# Patient Record
Sex: FEMALE | Race: WHITE | Employment: UNEMPLOYED | URBAN - METROPOLITAN AREA
[De-identification: names, ages, dates, MRNs, and addresses within clinical notes are randomized per-mention and may not be internally consistent; named-entity substitution may affect disease eponyms.]

---

## 2017-01-05 DIAGNOSIS — E78.5 HYPERLIPIDEMIA, UNSPECIFIED HYPERLIPIDEMIA TYPE: Primary | ICD-10-CM

## 2017-01-06 RX ORDER — ACETAMINOPHEN AND CODEINE PHOSPHATE 300; 30 MG/1; MG/1
TABLET ORAL
Qty: 30 TAB | Refills: 0 | OUTPATIENT
Start: 2017-01-06

## 2017-01-06 NOTE — TELEPHONE ENCOUNTER
I don't prescribe long term narcotics. Either get from one of her other doctors or see pain management.

## 2017-01-11 NOTE — TELEPHONE ENCOUNTER
Attempted to contact patient without success. Left voicemail message stating \"This message is for Fiordaliza Johnson,  This is Nay calling from Dr Ale Hernandez office to inform you that the medication requested from your pharmacy is not able to be filled at this time.  Please feel free to contact our office if you have any further questions \"

## 2017-01-12 ENCOUNTER — DOCUMENTATION ONLY (OUTPATIENT)
Dept: INTERNAL MEDICINE CLINIC | Age: 52
End: 2017-01-12

## 2017-01-13 LAB
EST. AVERAGE GLUCOSE BLD GHB EST-MCNC: 111 MG/DL
GLUCOSE SERPL-MCNC: 124 MG/DL (ref 65–99)
HBA1C MFR BLD: 5.5 % (ref 4.8–5.6)

## 2017-01-13 NOTE — PROGRESS NOTES
Note to patient:  Although your fasting blood sugar was elevated the day of testing your A1c shows that overall, your blood sugars are in the elaina range. Continue your dietary efforts.

## 2017-03-22 ENCOUNTER — TELEPHONE (OUTPATIENT)
Dept: INTERNAL MEDICINE CLINIC | Age: 52
End: 2017-03-22

## 2017-03-22 NOTE — TELEPHONE ENCOUNTER
----- Message from Shelly Sierra sent at 3/22/2017 12:24 PM EDT -----  Regarding: Dr. Keyon Bridges: [phone]  Patient called to advise that she is in the hospital at [facility] doctors, been in since last Friday, March 17,2017, Dr. Apolinar Ravi is the Heart doctor at [facility]. She is having heart electrical issues. Per Patient Dr. Apolinar Ravi sent for Dr. Jose Mccarthy at [facility]. Best contact number is [phone]. Patient would like to please have Dr. Marlys Gauthier to keep aware as to what is going on with her.       Thank you

## 2017-03-27 ENCOUNTER — OFFICE VISIT (OUTPATIENT)
Dept: INTERNAL MEDICINE CLINIC | Age: 52
End: 2017-03-27

## 2017-03-27 VITALS
HEART RATE: 60 BPM | OXYGEN SATURATION: 98 % | DIASTOLIC BLOOD PRESSURE: 60 MMHG | BODY MASS INDEX: 28.95 KG/M2 | WEIGHT: 163.4 LBS | HEIGHT: 63 IN | RESPIRATION RATE: 16 BRPM | TEMPERATURE: 98.4 F | SYSTOLIC BLOOD PRESSURE: 100 MMHG

## 2017-03-27 DIAGNOSIS — I47.20 VT (VENTRICULAR TACHYCARDIA): Primary | ICD-10-CM

## 2017-03-27 DIAGNOSIS — F41.1 GENERALIZED ANXIETY DISORDER: ICD-10-CM

## 2017-03-27 DIAGNOSIS — I25.10 CORONARY ARTERY DISEASE INVOLVING NATIVE CORONARY ARTERY OF NATIVE HEART WITHOUT ANGINA PECTORIS: ICD-10-CM

## 2017-03-27 RX ORDER — TEMAZEPAM 30 MG/1
30 CAPSULE ORAL
COMMUNITY

## 2017-03-27 RX ORDER — FAMOTIDINE 40 MG/1
40 TABLET, FILM COATED ORAL DAILY
COMMUNITY
End: 2018-12-27

## 2017-03-27 NOTE — PATIENT INSTRUCTIONS
Follow a Mediterranean style diet. Continue your current medications. Follow up with your heart specialists. Have all reports sent to our office.

## 2017-03-27 NOTE — PROGRESS NOTES
Chief Complaint   Patient presents with   [facility] Follow Up     [facility] 2017 -2017    Chest Pain (Angina)     Reviewed record in preparation for visit and have obtained necessary documentation. Identified pt with two pt identifiers(name and ). There are no preventive care reminders to display for this patient. Chief Complaint   Patient presents with   [facility] Follow Up     [facility] 2017 -2017    Chest Pain (Angina)        Wt Readings from Last 3 Encounters:   17 163 lb 6.4 oz (74.1 kg)   16 169 lb (76.7 kg)   16 170 lb 6.4 oz (77.3 kg)     Temp Readings from Last 3 Encounters:   17 98.4 °F (36.9 °C) (Oral)   16 97.7 °F (36.5 °C)   16 98 °F (36.7 °C) (Oral)     BP Readings from Last 3 Encounters:   17 100/60   16 123/67   16 110/70     Pulse Readings from Last 3 Encounters:   17 60   16 87   16 (!) 57           Learning Assessment:  :     Learning Assessment 2015   PRIMARY LEARNER Patient   HIGHEST LEVEL OF EDUCATION - PRIMARY LEARNER  SOME COLLEGE   BARRIERS PRIMARY LEARNER NONE   CO-LEARNER CAREGIVER No   PRIMARY LANGUAGE ENGLISH    NEED No   LEARNER PREFERENCE PRIMARY OTHER (COMMENT)   LEARNING SPECIAL TOPICS no   ANSWERED BY patient     self   RELATIONSHIP SELF   ASSESSMENT COMMENT none       Depression Screening:  :     PHQ 2 / 9, over the last two weeks 2015   Little interest or pleasure in doing things Nearly every day   Feeling down, depressed or hopeless Nearly every day   Total Score PHQ 2 6       Fall Risk Assessment:  :     No flowsheet data found. Abuse Screening:  :     Abuse Screening Questionnaire 2015   Do you ever feel afraid of your partner? N   Are you in a relationship with someone who physically or mentally threatens you? N   Is it safe for you to go home?  Y       Coordination of Care Questionnaire:  :     1) Have you been to an emergency room, urgent care clinic since your last visit? no   Hospitalized since your last visit? yes           2) Have you seen or consulted any other health care providers outside of [address] [place] [address] since your last visit? yes  (Include any pap smears or colon screenings in this section.)    3) Do you have an Advance Directive on file? no    4) Are you interested in receiving information on Advance Directives? NO      Patient is accompanied by self I have received verbal consent from David Michelle to discuss any/all medical information while they are present in the room. Reviewed record  In preparation for visit and have obtained necessary documentation.

## 2017-03-27 NOTE — PROGRESS NOTES
Subjective:     Chief Complaint   Patient presents with   [facility] Follow Up     [facility] 2017 -2017    Chest Pain (Angina)     She  is a 54y.o. year old female who presents for evaluation. Recently hospitalized at [facility] for chest pain. Developed chest pain about 10 years ago. Went to [facility] EM[facility]GENCY [facility]. Normal initial studies. Was in observation. Went into VT. Had a catheterization and no obstructive disease. Started on sotalol for VT. May be a candidate for pacemaker / AICD. Uncertainty is worrisome to her. Had a blood test that showed her blood was thick. Had a delayed reaction to catheterization. Wants disability considerations but told her that emphasis will be on abilities and not disabilities. Historical Data    Past Medical History:   Diagnosis Date    CAD (coronary artery disease)     Gastrointestinal disorder     hiatal hernia    Hypertension     Ill-defined condition     vertigo    MI (myocardial infarction) ([facility] [address]         Past Surgical History:   Procedure Laterality Date    CARDIAC SURG PROCEDURE UNLIST  12    stents x 3    CARDIAC SURG PROCEDURE UNLIST  10/2013    stents x 3    HX  SECTION      three    HX TUBAL LIGATION          Outpatient Encounter Prescriptions as of 3/27/2017   Medication Sig Dispense Refill    rosuvastatin (CRESTOR) 40 mg tablet Take 40 mg by mouth nightly.  aspirin delayed-release 81 mg tablet TAKE 2 TABS BY MOUTH DAILY. INDICATIONS: MYOCARDIAL INFARCTION PREVENTION 60 Tab 3    metoprolol succinate (TOPROL XL) 25 mg XL tablet Take 1.5 Tabs by mouth nightly. 135 Tab 2    LORazepam (ATIVAN) 0.5 mg tablet Take 1 Tab by mouth every eight (8) hours as needed for Anxiety. Max Daily Amount: 1.5 mg. Indications: ANXIETY 100 Tab 0    acetaminophen-codeine (TYLENOL #3) 300-30 mg per tablet Take 1 Tab by mouth every six (6) hours as needed for Pain. Max Daily Amount: 4 Tabs.  20 Tab 1    fenofibrate (LOFIBRA) 160 mg tablet Take 1 Tab by mouth daily. 90 Tab 2    atorvastatin (LIPITOR) 40 mg tablet Take 1 1/2 tablet once a day. 45 Tab 5    nitroglycerin (NITROSTAT) 0.4 mg SL tablet 1 Tab by SubLINGual route every five (5) minutes as needed. 1 Bottle 3    lisinopril (PRINIVIL, ZESTRIL) 5 mg tablet Take 1 Tab by mouth daily. 90 Tab 3    clopidogrel (PLAVIX) 75 mg tablet Take 1 Tab by mouth daily. 90 Tab 3    meclizine (ANTIVERT) 25 mg tablet Take  by mouth three (3) times daily as needed.  cyclobenzaprine (FLEXERIL) 10 mg tablet Take  by mouth three (3) times daily as needed for Muscle Spasm(s).  citalopram (CELEXA) 20 mg tablet Take 40 mg by mouth daily.  promethazine (PHENERGAN) 25 mg tablet Take 1 Tab by mouth every six (6) hours as needed for Nausea. 12 Tab 0    DICYCLOMINE HCL (BENTYL PO) Take  by mouth daily as needed.  OTHER,NON-FORMULARY, Pt takes a \"GI Cocktail\" formulated by Dr. Kaela Tejeda       No facility-administered encounter medications on file as of 3/27/2017. Allergies   Allergen Reactions    Erythromycin Anaphylaxis    Demerol [Meperidine] Other (comments)     hallucination        Social History     Social History    Marital status: LEGALLY      Spouse name: N/A    Number of children: N/A    Years of education: N/A     Occupational History    Not on file. Social History Main Topics    Smoking status: Former Smoker     Packs/day: 2.00     Years: 30.00     Types: Cigarettes     Quit date: 6/1/2012    Smokeless tobacco: Never Used    Alcohol use No    Drug use: No    Sexual activity: Yes     Partners: Male     Other Topics Concern    Not on file     Social History Narrative        Review of Systems  Pertinent items are noted in HPI. Objective:     Vitals:    03/27/17 1039   BP: 100/60   Pulse: 60   Resp: 16   Temp: 98.4 °F (36.9 °C)   TempSrc: Oral   SpO2: 98%   Weight: 163 lb 6.4 oz (74.1 kg)   Height: 5' 3\" (1.6 m)     Pleasant WF in no acute distress. Anxious.   Cardiac: RRR without murmurs, gallops or rubs. Lungs: Clear to auscultation. Neuro:  No focal motor deficits. ASSESSMENT / PLAN:   1. VT (ventricular tachycardia) ([address]  · Being treated medically for now. · Follow up with Cardiology. 2. Coronary artery disease involving native coronary artery of native heart without angina pectoris  · Continue secondary risk factor reduction. · Last catheterization shows no obstructive disease and a normal EF. 3. Generalized anxiety disorder  · Patient uses word \"cardiac cripple\" but no supportive evidence of that. · Encouraged her to think in terms of her abilities and not her disabilities. Patient Instructions   Follow a Mediterranean style diet. Continue your current medications. Follow up with your heart specialists. Have all reports sent to our office. Follow-up Disposition:  Return in about 6 weeks (around 5/8/2017) for F/U VT. Advised her to call back or return to office if symptoms worsen/change/persist.  Discussed expected course/resolution/complications of diagnosis in detail with patient. Medication risks/benefits/costs/interactions/alternatives discussed with patient. She was given an after visit summary which includes diagnoses, current medications, & vitals. She expressed understanding with the diagnosis and plan.

## 2017-03-27 NOTE — MR AVS SNAPSHOT
Visit Information Date & Time Provider Department Dept. Phone Encounter #  
 3/27/2017 10:15 AM Lindsey Ramirez MD [place] [id number] [facility] [phone] [id number] Follow-up Instructions Return in about 6 weeks (around 5/8/2017) for F/U VT. Upcoming Health Maintenance Date Due  
 PAP AKA CERVICAL CYTOLOGY 6/15/2019 DTaP/Tdap/Td series (2 - Td) 9/1/2022 Allergies as of 3/27/2017  Review Complete On: 3/27/2017 By: Lindsey Ramirez MD  
  
 Severity Noted Reaction Type Reactions Erythromycin High 11/11/2010    Anaphylaxis Demerol [Meperidine]  11/11/2010    Other (comments)  
 hallucination Sulfa (Sulfonamide Antibiotics)  03/27/2017    Other (comments) Current Immunizations  Reviewed on 1/29/2013 Name Date DTaP 9/1/2012 Influenza Vaccine 10/31/2015, 10/1/2012 Pneumococcal Vaccine (Unspecified Type) 10/1/2005 Not reviewed this visit You Were Diagnosed With   
  
 Codes Comments [facility]    -  Primary ICD-10-CM: Y12.4 ICD-9-CM: 427.1 Coronary artery disease involving native coronary artery of native heart without angina pectoris     ICD-10-CM: I25.10 ICD-9-CM: 414.01 Vitals BP Pulse Temp Resp Height(growth percentile) Weight(growth percentile) 100/60 (BP 1 Location: Right arm, BP Patient Position: Sitting) 60 98.4 °F (36.9 °C) (Oral) 16 5' 3\" (1.6 m) 163 lb 6.4 oz (74.1 kg) SpO2 BMI OB Status Smoking Status 98% 28.95 kg/m2 Having regular periods Former Smoker BMI and BSA Data Body Mass Index Body Surface Area  
 28.95 kg/m 2 1.81 m 2 Preferred Pharmacy Pharmacy Name Phone [address], [facility] [address] Your Updated Medication List  
  
   
This list is accurate as of: 3/27/17 11:07 AM.  Always use your most recent med list.  
  
  
  
  
 aspirin delayed-release 81 mg tablet TAKE 2 TABS BY MOUTH DAILY. INDICATIONS: MYOCARDIAL INFARCTION PREVENTION  
  
 BENTYL PO Take  by mouth daily as needed. citalopram 20 mg tablet Commonly known as:  Campbell Dame Take 40 mg by mouth daily. clopidogrel 75 mg Tab Commonly known as:  PLAVIX Take 1 Tab by mouth daily. CRESTOR 40 mg tablet Generic drug:  rosuvastatin Take 40 mg by mouth nightly. famotidine 40 mg tablet Commonly known as:  PEPCID Take 40 mg by mouth daily. fenofibrate 160 mg tablet Commonly known as:  LOFIBRA Take 1 Tab by mouth daily. lisinopril 5 mg tablet Commonly known as:  Soni Edman Take 1 Tab by mouth daily. OTHER(NON-FORMULARY) Pt takes a \"GI Cocktail\" formulated by Dr. Fernando Blend SOTALOL PO Take 60 mg by mouth two (2) times a day. temazepam 30 mg capsule Commonly known as:  RESTORIL Take  by mouth nightly as needed for Sleep. Follow-up Instructions Return in about 6 weeks (around 5/8/2017) for F/U VT. Patient Instructions Follow a Mediterranean style diet. Continue your current medications. Follow up with your heart specialists. Have all reports sent to our office. Introducing [facility]! Dear Sheldon Galvez: 
Thank you for requesting a [employer] account. Our records indicate that you already have an active P[employer] account. You can access your account anytime at [email or web address] Did you know that you can access your hospital and ER discharge instructions at any time in P[employer]k? You can also review all of your test results from your hospital stay or ER visit. Additional Information If you have questions, please visit the Frequently Asked Questions section of the P[employer]k website at [email or web address] Remember, PivotDesk is NOT to be used for urgent needs. For medical emergencies, dial 911. Now available from your iPhone and Android! Please provide this summary of care documentation to your next provider. Your primary care clinician is listed as Mikal Pace. If you have any questions after today's visit, please call [phone].

## 2017-05-31 ENCOUNTER — TELEPHONE (OUTPATIENT)
Dept: INTERNAL MEDICINE CLINIC | Age: 52
End: 2017-05-31

## 2017-05-31 NOTE — TELEPHONE ENCOUNTER
Pt wants dr Marylou Reagan  To know that she will be having heart surgery at [facility] by dr Arjun Olivia on [phone]

## 2018-05-27 ENCOUNTER — OFFICE VISIT (OUTPATIENT)
Dept: URGENT CARE | Age: 53
End: 2018-05-27

## 2018-05-27 VITALS
HEIGHT: 63 IN | SYSTOLIC BLOOD PRESSURE: 129 MMHG | RESPIRATION RATE: 18 BRPM | TEMPERATURE: 98.6 F | OXYGEN SATURATION: 98 % | HEART RATE: 81 BPM | DIASTOLIC BLOOD PRESSURE: 74 MMHG | WEIGHT: 175 LBS | BODY MASS INDEX: 31.01 KG/M2

## 2018-05-27 DIAGNOSIS — L25.5 RHUS DERMATITIS: Primary | ICD-10-CM

## 2018-05-27 RX ORDER — ALPRAZOLAM 1 MG/1
1 TABLET ORAL
COMMUNITY

## 2018-05-27 RX ORDER — EZETIMIBE 10 MG/1
10 TABLET ORAL DAILY
COMMUNITY

## 2018-05-27 NOTE — PATIENT INSTRUCTIONS
Consider taking Allegra daily and supplementing with Pepcid twice daily and an occasional Benadryl is needed. Topical over the counter treatments may also be helpful. We discussed oral prednisone but with your history of irregular heartbeats from taking prednisone in the past, I cannot recommend them at this time. See your cardiologist and PCP for additional suggestions for ongoing care. Poison [place], [place], and [place]: Care Instructions  Your Care Instructions    Poison ivy, poison oak, and poison sumac are plants that can cause a skin rash upon contact. The red, itchy rash often shows up in lines or streaks and may cause fluid-filled blisters or large, raised hives. The rash is caused by an allergic reaction to an oil in poison ivy, oak, and sumac. The rash may occur when you touch the plant or when you touch clothing, pet fur, sporting gear, gardening tools, or other objects that have come in contact with one of these plants. You cannot catch or spread the rash, even if you touch it or the blister fluid, because the plant oil will already have been absorbed or washed off the skin. The rash may seem to be spreading, but either it is still developing from earlier contact or you have touched something that still has the plant oil on it. Follow-up care is a key part of your treatment and safety. Be sure to make and go to all appointments, and call your doctor if you are having problems. It's also a good idea to know your test results and keep a list of the medicines you take. How can you care for yourself at home? · If your doctor prescribed a cream, use it as directed. If your doctor prescribed medicine, take it exactly as prescribed. Call your doctor if you think you are having a problem with your medicine. · Use cold, wet cloths to reduce itching. · Keep cool, and stay out of the sun. · Leave the rash open to the air.   · Wash all clothing or other things that may have come in contact with the plant oil.  · Avoid most lotions and ointments until the rash heals. Calamine lotion may help relieve symptoms of a plant rash. Use it 3 or 4 times a day. To prevent poison ivy exposure  If you know that you will be near poison ivy, oak, or sumac, you can try these options:  · Use a product designed to help prevent plant oil from getting on the skin. These products, such as Ivy X Pre-Contact Skin Solution, come in lotions, sprays, or towelettes. You put the product on your skin right before you go outdoors. · If you did not use a preventive product and you have had contact with plant oil, clean it off your skin as soon as possible. Use a product such as Tecnu Original Outdoor Skin Cleanser. These products can also be used to clean plant oil from clothing or tools. When should you call for help? Call your doctor now or seek immediate medical care if:  ? · Your rash gets worse, and you start to feel bad and have a fever, a stiff neck, nausea, and vomiting. ? · You have signs of infection, such as:  ¨ Increased pain, swelling, warmth, or redness. ¨ Red streaks leading from the rash. ¨ Pus draining from the rash. ¨ A fever. ? Watch closely for changes in your health, and be sure to contact your doctor if:  ? · You have new blisters or bruises, or the rash spreads and looks like a sunburn. ? · The rash gets worse, or it comes back after nearly disappearing. ? · You think a medicine you are using is making your rash worse. ? · Your rash does not clear up after 1 to 2 weeks of home treatment. ? · You have joint aches or body aches with your rash. Where can you learn more? Go to http://shiv-lavell.info/. Enter L960 in the search box to learn more about \"Poison [place], [place], and [place]: Care Instructions. \"  Current as of: October 13, 2016  Content Version: 11.4  © 0405-6558 [employer].  Care instructions adapted under license by [employer] (which disclaims liability or warranty for this information). If you have questions about a medical condition or this instruction, always ask your healthcare professional. [place] [address] any warranty or liability for your use of this information.

## 2018-05-27 NOTE — PROGRESS NOTES
Patient is a 48 y.o. female presenting with poison ivy. The history is provided by the patient. Poison Ivy/Poison Oak/Poison [place] Exposure   This is a new problem. The current episode started more than 2 days ago (about a week of some poison ivy on her arms and now is spreading to her arms and belly and groin. The itching is not controlled with anything she has tried and she is cannot take steroids (they give her irregular heart beats) ). The problem has been gradually worsening. Pertinent negatives include no chest pain, no abdominal pain, no headaches and no shortness of breath. Nothing aggravates the symptoms. Nothing relieves the symptoms. She has tried Benadryl (topical ivy treatment) for the symptoms. The treatment provided no relief. Past Medical History:   Diagnosis Date    CAD (coronary artery disease)     Gastrointestinal disorder     hiatal hernia    Hypertension     Ill-defined condition     vertigo    MI (myocardial infarction) ([facility] [address]         Past Surgical History:   Procedure Laterality Date    CARDIAC SURG PROCEDURE UNLIST  12    stents x 3    CARDIAC SURG PROCEDURE UNLIST  10/2013    stents x 3    HX  SECTION      three    HX TUBAL LIGATION           Family History   Problem Relation Age of Onset    Elevated Lipids Father     Heart Disease Father 46     heart attack    Elevated Lipids Brother     Elevated Lipids Paternal Grandfather     Heart Disease Paternal Grandfather     Hypertension Paternal Grandfather     Stroke Paternal Grandfather         Social History     Social History    Marital status: LEGALLY      Spouse name: N/A    Number of children: N/A    Years of education: N/A     Occupational History    Not on file.      Social History Main Topics    Smoking status: Former Smoker     Packs/day: 2.00     Years: 30.00     Types: Cigarettes     Quit date: 2012    Smokeless tobacco: Never Used    Alcohol use No    Drug use: No    Sexual activity: Yes     Partners: Male     Other Topics Concern    Not on file     Social History Narrative                ALLERGIES: Erythromycin; Demerol [meperidine]; and Sulfa (sulfonamide antibiotics)    Review of Systems   Constitutional: Positive for fever (low grade fevers?). HENT: Negative. Respiratory: Negative. Negative for shortness of breath. Cardiovascular: Negative for chest pain. Gastrointestinal: Negative for abdominal pain. Musculoskeletal: Negative. Skin: Positive for rash. Neurological: Negative. Negative for headaches. Vitals:    05/27/18 1744   BP: 129/74   Pulse: 81   Resp: 18   Temp: 98.6 °F (37 °C)   SpO2: 98%   Weight: 175 lb (79.4 kg)   Height: 5' 3\" (1.6 m)       Physical Exam   Constitutional: She is oriented to person, place, and time. She appears well-developed and well-nourished. HENT:   Head: Normocephalic. Mouth/Throat: No oropharyngeal exudate. Eyes: Conjunctivae are normal.   Neck: Normal range of motion. No tracheal deviation present. No thyromegaly present. Cardiovascular: Normal rate and regular rhythm. Murmur heard. Pulmonary/Chest: Effort normal and breath sounds normal. No respiratory distress. She has no wheezes. She has no rales. Musculoskeletal: Normal range of motion. She exhibits no edema or tenderness. Lymphadenopathy:     She has no cervical adenopathy. Neurological: She is alert and oriented to person, place, and time. Skin: Skin is warm. Rash (scattered linear vesicular rash on the left FA and Rt upper arm and a few scattered spots on the rt abdomen and upper thighs, No rash noted on the legs or back  ) noted. No erythema. Psychiatric: She has a normal mood and affect. Her behavior is normal.   Nursing note and vitals reviewed. MDM    Procedures                         ICD-10-CM ICD-9-CM    1. Rhus dermatitis L23.7 692.6      No orders of the defined types were placed in this encounter.     The patients condition was discussed with the patient and they understand. The patient is to follow up with primary care doctor ,If signs and symptoms become worse the pt is to go to the ER. The patient is to take medications as prescribed. Pt states she feels she is having a severe reaction and that she needs to have a steroid so she may go over to the hospital to get another opinion and see if they will give her steroids. I told we could try conservative care but she declined. She stated [facility].

## 2018-12-27 ENCOUNTER — HOSPITAL ENCOUNTER (EMERGENCY)
Age: 53
Discharge: HOME | End: 2018-12-28
Attending: EMERGENCY MEDICINE
Payer: MEDICAID

## 2018-12-27 DIAGNOSIS — R07.9 CHEST PAIN, UNSPECIFIED TYPE: Primary | ICD-10-CM

## 2018-12-27 DIAGNOSIS — K29.00 ACUTE GASTRITIS WITHOUT HEMORRHAGE, UNSPECIFIED GASTRITIS TYPE: ICD-10-CM

## 2018-12-27 LAB
ALBUMIN SERPL-MCNC: 4.1 G/DL (ref 3.5–5)
ALBUMIN/GLOB SERPL: 1.1 {RATIO} (ref 1.1–2.2)
ALP SERPL-CCNC: 83 U/L (ref 45–117)
ALT SERPL-CCNC: 34 U/L (ref 12–78)
ANION GAP SERPL CALC-SCNC: 11 MMOL/L (ref 5–15)
AST SERPL-CCNC: 34 U/L (ref 15–37)
BASOPHILS # BLD: 0.1 K/UL (ref 0–0.1)
BASOPHILS NFR BLD: 1 % (ref 0–1)
BILIRUB SERPL-MCNC: 0.4 MG/DL (ref 0.2–1)
BUN SERPL-MCNC: 18 MG/DL (ref 6–20)
BUN/CREAT SERPL: 19 (ref 12–20)
CALCIUM SERPL-MCNC: 9.5 MG/DL (ref 8.5–10.1)
CHLORIDE SERPL-SCNC: 103 MMOL/L (ref 97–108)
CO2 SERPL-SCNC: 26 MMOL/L (ref 21–32)
COMMENT, HOLDF: NORMAL
CREAT SERPL-MCNC: 0.97 MG/DL (ref 0.55–1.02)
DIFFERENTIAL METHOD BLD: ABNORMAL
EOSINOPHIL # BLD: 0 K/UL (ref 0–0.4)
EOSINOPHIL NFR BLD: 0 % (ref 0–7)
ERYTHROCYTE [DISTWIDTH] IN BLOOD BY AUTOMATED COUNT: 12.8 % (ref 11.5–14.5)
GLOBULIN SER CALC-MCNC: 3.6 G/DL (ref 2–4)
GLUCOSE SERPL-MCNC: 151 MG/DL (ref 65–100)
HCT VFR BLD AUTO: 40.8 % (ref 35–47)
HGB BLD-MCNC: 13.9 G/DL (ref 11.5–16)
IMM GRANULOCYTES # BLD: 0 K/UL (ref 0–0.04)
IMM GRANULOCYTES NFR BLD AUTO: 0 % (ref 0–0.5)
LYMPHOCYTES # BLD: 2 K/UL (ref 0.8–3.5)
LYMPHOCYTES NFR BLD: 22 % (ref 12–49)
MCH RBC QN AUTO: 30.8 PG (ref 26–34)
MCHC RBC AUTO-ENTMCNC: 34.1 G/DL (ref 30–36.5)
MCV RBC AUTO: 90.5 FL (ref 80–99)
MONOCYTES # BLD: 0.3 K/UL (ref 0–1)
MONOCYTES NFR BLD: 3 % (ref 5–13)
NEUTS SEG # BLD: 6.6 K/UL (ref 1.8–8)
NEUTS SEG NFR BLD: 74 % (ref 32–75)
NRBC # BLD: 0 K/UL (ref 0–0.01)
NRBC BLD-RTO: 0 PER 100 WBC
PLATELET # BLD AUTO: 281 K/UL (ref 150–400)
PMV BLD AUTO: 10.4 FL (ref 8.9–12.9)
POTASSIUM SERPL-SCNC: 4.9 MMOL/L (ref 3.5–5.1)
PROT SERPL-MCNC: 7.7 G/DL (ref 6.4–8.2)
RBC # BLD AUTO: 4.51 M/UL (ref 3.8–5.2)
SAMPLES BEING HELD,HOLD: NORMAL
SODIUM SERPL-SCNC: 140 MMOL/L (ref 136–145)
TROPONIN I SERPL-MCNC: <0.05 NG/ML
TROPONIN I SERPL-MCNC: <0.05 NG/ML
WBC # BLD AUTO: 9 K/UL (ref 3.6–11)

## 2018-12-27 PROCEDURE — 74011250636 HC RX REV CODE- 250/636: Performed by: EMERGENCY MEDICINE

## 2018-12-27 PROCEDURE — 85025 COMPLETE CBC W/AUTO DIFF WBC: CPT

## 2018-12-27 PROCEDURE — 96375 TX/PRO/DX INJ NEW DRUG ADDON: CPT

## 2018-12-27 PROCEDURE — 74011250637 HC RX REV CODE- 250/637: Performed by: EMERGENCY MEDICINE

## 2018-12-27 PROCEDURE — 96374 THER/PROPH/DIAG INJ IV PUSH: CPT

## 2018-12-27 PROCEDURE — 84484 ASSAY OF TROPONIN QUANT: CPT

## 2018-12-27 PROCEDURE — 99284 EMERGENCY DEPT VISIT MOD MDM: CPT

## 2018-12-27 PROCEDURE — 36415 COLL VENOUS BLD VENIPUNCTURE: CPT

## 2018-12-27 PROCEDURE — 80053 COMPREHEN METABOLIC PANEL: CPT

## 2018-12-27 PROCEDURE — 74011000250 HC RX REV CODE- 250: Performed by: EMERGENCY MEDICINE

## 2018-12-27 PROCEDURE — 93005 ELECTROCARDIOGRAM TRACING: CPT

## 2018-12-27 RX ORDER — CEPHALEXIN 500 MG/1
500 TABLET ORAL 4 TIMES DAILY
COMMUNITY
End: 2018-12-27

## 2018-12-27 RX ORDER — ONDANSETRON 2 MG/ML
4 INJECTION INTRAMUSCULAR; INTRAVENOUS
Status: COMPLETED | OUTPATIENT
Start: 2018-12-27 | End: 2018-12-27

## 2018-12-27 RX ORDER — AMOXICILLIN AND CLAVULANATE POTASSIUM 875; 125 MG/1; MG/1
1 TABLET, FILM COATED ORAL EVERY 12 HOURS
COMMUNITY
End: 2019-10-04

## 2018-12-27 RX ORDER — PREDNISONE 20 MG/1
20 TABLET ORAL
COMMUNITY
End: 2018-12-27

## 2018-12-27 RX ORDER — MORPHINE SULFATE 2 MG/ML
6 INJECTION, SOLUTION INTRAMUSCULAR; INTRAVENOUS
Status: COMPLETED | OUTPATIENT
Start: 2018-12-27 | End: 2018-12-27

## 2018-12-27 RX ORDER — FAMOTIDINE 20 MG/1
20 TABLET, FILM COATED ORAL 2 TIMES DAILY
Qty: 20 TAB | Refills: 0 | Status: SHIPPED | OUTPATIENT
Start: 2018-12-27 | End: 2019-01-06

## 2018-12-27 RX ORDER — FAMOTIDINE 10 MG/ML
20 INJECTION INTRAVENOUS
Status: DISCONTINUED | OUTPATIENT
Start: 2018-12-27 | End: 2018-12-27 | Stop reason: SDUPTHER

## 2018-12-27 RX ORDER — PANTOPRAZOLE SODIUM 40 MG/1
40 TABLET, DELAYED RELEASE ORAL DAILY
COMMUNITY

## 2018-12-27 RX ADMIN — ONDANSETRON 4 MG: 2 INJECTION INTRAMUSCULAR; INTRAVENOUS at 22:19

## 2018-12-27 RX ADMIN — MORPHINE SULFATE 6 MG: 2 INJECTION, SOLUTION INTRAMUSCULAR; INTRAVENOUS at 22:19

## 2018-12-27 RX ADMIN — FAMOTIDINE 20 MG: 10 INJECTION, SOLUTION INTRAVENOUS at 21:45

## 2018-12-27 RX ADMIN — LIDOCAINE HYDROCHLORIDE 40 ML: 20 SOLUTION ORAL; TOPICAL at 21:45

## 2018-12-28 VITALS
DIASTOLIC BLOOD PRESSURE: 64 MMHG | SYSTOLIC BLOOD PRESSURE: 110 MMHG | HEART RATE: 59 BPM | RESPIRATION RATE: 13 BRPM | TEMPERATURE: 98 F | HEIGHT: 63 IN | OXYGEN SATURATION: 97 % | BODY MASS INDEX: 31 KG/M2

## 2018-12-28 LAB
ATRIAL RATE: 78 BPM
CALCULATED P AXIS, ECG09: 62 DEGREES
CALCULATED R AXIS, ECG10: 6 DEGREES
CALCULATED T AXIS, ECG11: 59 DEGREES
DIAGNOSIS, 93000: NORMAL
P-R INTERVAL, ECG05: 176 MS
Q-T INTERVAL, ECG07: 384 MS
QRS DURATION, ECG06: 72 MS
QTC CALCULATION (BEZET), ECG08: 437 MS
VENTRICULAR RATE, ECG03: 78 BPM

## 2018-12-28 NOTE — ED TRIAGE NOTES
Triage Note: Patient arrives to ER complaining of mid chest pain that started approximately at 1900. States pain is non radiating, but states its crushing pain. Patient states pain was intermittent at first now constant. Denies SOB. + Nausea. Hx MI and multiple stents. Also complains of green diarrhea, states it was [employer]. \" States she took Tums and 81 mg aspirin at home with no relief.

## 2018-12-28 NOTE — ED PROVIDER NOTES
The patient presents to the ED with chest pain. She became ill with strep tonsillitis 1 week ago. She was seen at an outside ED. She was treated with Rocephin IM and Keflex. 3 days later, she developed sores in her moth. She was seen in the ED again and given a steroid shot. She was placed on Prednisone and also told to take Augmentin and then to restart Keflex in a few days - along with the Augmentin. She resumed the Keflex today. She has not been taking her Protonix because she was told it would interfere with the absorption of the antibiotics. She felt tired all today today. She developed what felt like indigestion around 7 pm. She laid down and symptoms increased. She couldn't find her Maalox, so she took Tums and aspirin with no relief. She also feel she is having palpitations. Pain Is 9/10 in her mid chest. Pain does not radiate. She has no shortness of breath. She has nausea, but no vomiting. She did have diaphoresis. She has hx CAD with stents. Last cath was approx 6 months ago and was normal. 
 
Cards: Dr. Hilton Altamirano. The history is provided by the patient. Chest Pain Associated symptoms include diaphoresis and nausea. Pertinent negatives include no abdominal pain, no cough, no fever, no headaches, no shortness of breath, no vomiting and no weakness. Past Medical History:  
Diagnosis Date  CAD (coronary artery disease)  Gastrointestinal disorder   
 hiatal hernia  Hypertension  Ill-defined condition   
 vertigo  MI (myocardial infarction) ([address] Past Surgical History:  
Procedure Laterality Date  CARDIAC SURG PROCEDURE UNLIST  12  
 stents x 3  
 CARDIAC SURG PROCEDURE UNLIST  10/2013  
 stents x 3  
 HX  SECTION    
 three  HX TUBAL LIGATION Family History:  
Problem Relation Age of Onset  Elevated Lipids Father  Heart Disease Father 46  
     heart attack  Elevated Lipids Brother  Elevated Lipids Paternal Grandfather  Heart Disease Paternal Grandfather  Hypertension Paternal Grandfather  Stroke Paternal Grandfather Social History Socioeconomic History  Marital status: LEGALLY  Spouse name: Not on file  Number of children: Not on file  Years of education: Not on file  Highest education level: Not on file Social Needs  Financial resource strain: Not on file  Food insecurity - worry: Not on file  Food insecurity - inability: Not on file  Transportation needs - medical: Not on file  Transportation needs - non-medical: Not on file Occupational History  Not on file Tobacco Use  Smoking status: Former Smoker Packs/day: 2.00 Years: 30.00 Pack years: 60.00 Types: Cigarettes Last attempt to quit: 2012 Years since quittin.5  Smokeless tobacco: Never Used Substance and Sexual Activity  Alcohol use: No  
  Alcohol/week: 0.0 oz  Drug use: No  
 Sexual activity: Yes  
  Partners: Male Other Topics Concern  Not on file Social History Narrative  Not on file ALLERGIES: Erythromycin; Demerol [meperidine]; and Sulfa (sulfonamide antibiotics) Review of Systems Constitutional: Positive for diaphoresis. Negative for appetite change and fever. HENT: Negative for congestion, nosebleeds and sore throat. Eyes: Negative for discharge and visual disturbance. Respiratory: Negative for cough and shortness of breath. Cardiovascular: Positive for chest pain. Gastrointestinal: Positive for nausea. Negative for abdominal pain, diarrhea and vomiting. Genitourinary: Negative for dysuria. Musculoskeletal: Negative. Skin: Negative for rash. Neurological: Negative for weakness and headaches. Hematological: Negative for adenopathy. Psychiatric/Behavioral: Negative. All other systems reviewed and are negative. Vitals:  
 18 2215 18 2347 18 2355 18 0000 BP: 113/68 110/55  110/64 Pulse: 63 (!) 59 61 (!) 59 Resp: 17 19 16 13 Temp:      
SpO2: 94% 97% 97% Height:      
      
 
Physical Exam  
Constitutional: She appears well-developed and well-nourished. HENT:  
Head: Normocephalic and atraumatic. Eyes: Conjunctivae are normal.  
Neck: Normal range of motion. Neck supple. Cardiovascular: Normal rate, regular rhythm and normal heart sounds. Pulmonary/Chest: Effort normal and breath sounds normal.  
Abdominal: Soft. Bowel sounds are normal.  
Neurological: She is alert. Skin: Skin is warm and dry. Psychiatric: She has a normal mood and affect. Nursing note and vitals reviewed. MDM Procedures ED EKG interpretation: 
Rhythm: normal sinus rhythm; and regular . Rate (approx.): 78; Axis: normal; P wave: normal; QRS interval: normal ; ST/T wave: normal; This EKG was interpreted by Alessia Wong MD,ED Provider. A/P: 
1. Chest pain - suspect esphogeal spasm. Advised to resume Protonix. May also take Pepcid. She is to call her cardiologist in the AM and inform of ED visit and follow-up as advised. Stop Prednisone and Keflex. Complete course of Augmentin. Patient's results have been reviewed with them. Patient and/or family have verbally conveyed their understanding and agreement of the patient's signs, symptoms, diagnosis, treatment and prognosis and additionally agree to follow up as recommended or return to the Emergency Room should their condition change prior to follow-up. Discharge instructions have also been provided to the patient with some educational information regarding their diagnosis as well a list of reasons why they would want to return to the ER prior to their follow-up appointment should their condition change.

## 2018-12-28 NOTE — DISCHARGE INSTRUCTIONS
- Stop Keflex and Prednisone. Complete Augmentin. - Begin Pepcid. - Return to ED for any concerns. Chest Pain: Care Instructions  Your Care Instructions    There are many things that can cause chest pain. Some are not serious and will get better on their own in a few days. But some kinds of chest pain need more testing and treatment. Your doctor may have recommended a follow-up visit in the next 8 to 12 hours. If you are not getting better, you may need more tests or treatment. Even though your doctor has released you, you still need to watch for any problems. The doctor carefully checked you, but sometimes problems can develop later. If you have new symptoms or if your symptoms do not get better, get medical care right away. If you have worse or different chest pain or pressure that lasts more than 5 minutes or you passed out (lost consciousness), call 911 or seek other emergency help right away. A medical visit is only one step in your treatment. Even if you feel better, you still need to do what your doctor recommends, such as going to all suggested follow-up appointments and taking medicines exactly as directed. This will help you recover and help prevent future problems. How can you care for yourself at home? · Rest until you feel better. · Take your medicine exactly as prescribed. Call your doctor if you think you are having a problem with your medicine. · Do not drive after taking a prescription pain medicine. When should you call for help? Call 911 if:    · You passed out (lost consciousness).     · You have severe difficulty breathing.     · You have symptoms of a heart attack. These may include:  ? Chest pain or pressure, or a strange feeling in your chest.  ? Sweating. ? Shortness of breath. ? Nausea or vomiting. ? Pain, pressure, or a strange feeling in your back, neck, jaw, or upper belly or in one or both shoulders or arms. ? Lightheadedness or sudden weakness.   ? A fast or irregular heartbeat. After you call 911, the  may tell you to chew 1 adult-strength or 2 to 4 low-dose aspirin. Wait for an ambulance. Do not try to drive yourself.    Call your doctor today if:    · You have any trouble breathing.     · Your chest pain gets worse.     · You are dizzy or lightheaded, or you feel like you may faint.     · You are not getting better as expected.     · You are having new or different chest pain. Where can you learn more? Go to http://shiv-lavell.info/. Enter A120 in the search box to learn more about \"Chest Pain: Care Instructions. \"  Current as of: November 20, 2017  Content Version: 11.8  © 7803-4041 [employer]. Care instructions adapted under license by [employer] (which disclaims liability or warranty for this information). If you have questions about a medical condition or this instruction, always ask your healthcare professional. [place] [address] any warranty or liability for your use of this information. Gastritis: Care Instructions  Your Care Instructions    Gastritis is a sore and upset stomach. It happens when something irritates the stomach lining. Many things can cause it. These include an infection such as the flu or something you ate or drank. Medicines or a sore on the lining of the stomach (ulcer) also can cause it. Your belly may bloat and ache. You may belch, vomit, and feel sick to your stomach. You should be able to relieve the problem by taking medicine. And it may help to change your diet. If gastritis lasts, your doctor may prescribe medicine. Follow-up care is a key part of your treatment and safety. Be sure to make and go to all appointments, and call your doctor if you are having problems. It's also a good idea to know your test results and keep a list of the medicines you take. How can you care for yourself at home?   · If your doctor prescribed antibiotics, take them as directed. Do not stop taking them just because you feel better. You need to take the full course of antibiotics. · Be safe with medicines. If your doctor prescribed medicine to decrease stomach acid, take it as directed. Call your doctor if you think you are having a problem with your medicine. · Do not take any other medicine, including over-the-counter pain relievers, without talking to your doctor first.  · If your doctor recommends over-the-counter medicine to reduce stomach acid, such as Pepcid AC, Prilosec, Tagamet HB, or Zantac 75, follow the directions on the label. · Drink plenty of fluids (enough so that your urine is light yellow or clear like water) to prevent dehydration. Choose water and other caffeine-free clear liquids. If you have kidney, heart, or liver disease and have to limit fluids, talk with your doctor before you increase the amount of fluids you drink. · Limit how much alcohol you drink. · Avoid coffee, tea, cola drinks, chocolate, and other foods with caffeine. They increase stomach acid. When should you call for help? Call 911 anytime you think you may need emergency care. For example, call if:    · You vomit blood or what looks like coffee grounds.     · You pass maroon or very bloody stools.    Call your doctor now or seek immediate medical care if:    · You start breathing fast and have not produced urine in the last 8 hours.     · You cannot keep fluids down.    Watch closely for changes in your health, and be sure to contact your doctor if:    · You do not get better as expected. Where can you learn more? Go to http://shiv-lavell.info/. Enter [id number] in the search box to learn more about \"Gastritis: Care Instructions. \"  Current as of: March 28, 2018  Content Version: 11.8  © 3694-7852 [employer]. Care instructions adapted under license by [employer] (which disclaims liability or warranty for this information).  If you have questions about a medical condition or this instruction, always ask your healthcare professional. [place] [address] any warranty or liability for your use of this information.

## 2019-02-11 ENCOUNTER — HOSPITAL ENCOUNTER (EMERGENCY)
Age: 54
Discharge: HOME | End: 2019-02-11
Attending: EMERGENCY MEDICINE
Payer: MEDICAID

## 2019-02-11 ENCOUNTER — APPOINTMENT (OUTPATIENT)
Dept: GENERAL RADIOLOGY | Age: 54
End: 2019-02-11
Attending: EMERGENCY MEDICINE
Payer: MEDICAID

## 2019-02-11 VITALS
TEMPERATURE: 98 F | OXYGEN SATURATION: 99 % | WEIGHT: 186.29 LBS | BODY MASS INDEX: 33 KG/M2 | DIASTOLIC BLOOD PRESSURE: 75 MMHG | HEART RATE: 75 BPM | RESPIRATION RATE: 18 BRPM | SYSTOLIC BLOOD PRESSURE: 121 MMHG

## 2019-02-11 DIAGNOSIS — R00.2 PALPITATIONS: ICD-10-CM

## 2019-02-11 DIAGNOSIS — E87.6 HYPOKALEMIA: ICD-10-CM

## 2019-02-11 DIAGNOSIS — J11.1 INFLUENZA: Primary | ICD-10-CM

## 2019-02-11 DIAGNOSIS — R01.1 HEART MURMUR: ICD-10-CM

## 2019-02-11 LAB
ALBUMIN SERPL-MCNC: 3.5 G/DL (ref 3.5–5)
ALBUMIN/GLOB SERPL: 1.1 {RATIO} (ref 1.1–2.2)
ALP SERPL-CCNC: 83 U/L (ref 45–117)
ALT SERPL-CCNC: 35 U/L (ref 12–78)
ANION GAP SERPL CALC-SCNC: 10 MMOL/L (ref 5–15)
APPEARANCE UR: CLEAR
AST SERPL-CCNC: 26 U/L (ref 15–37)
ATRIAL RATE: 93 BPM
BACTERIA URNS QL MICRO: NEGATIVE /HPF
BASOPHILS # BLD: 0.1 K/UL (ref 0–0.1)
BASOPHILS NFR BLD: 1 % (ref 0–1)
BILIRUB SERPL-MCNC: 0.4 MG/DL (ref 0.2–1)
BILIRUB UR QL: NEGATIVE
BNP SERPL-MCNC: 176 PG/ML (ref 0–125)
BUN SERPL-MCNC: 15 MG/DL (ref 6–20)
BUN/CREAT SERPL: 16 (ref 12–20)
CALCIUM SERPL-MCNC: 9.5 MG/DL (ref 8.5–10.1)
CALCULATED P AXIS, ECG09: 43 DEGREES
CALCULATED R AXIS, ECG10: 7 DEGREES
CALCULATED T AXIS, ECG11: 54 DEGREES
CHLORIDE SERPL-SCNC: 106 MMOL/L (ref 97–108)
CO2 SERPL-SCNC: 28 MMOL/L (ref 21–32)
COLOR UR: ABNORMAL
CREAT SERPL-MCNC: 0.96 MG/DL (ref 0.55–1.02)
DIAGNOSIS, 93000: NORMAL
DIFFERENTIAL METHOD BLD: NORMAL
EOSINOPHIL # BLD: 0.3 K/UL (ref 0–0.4)
EOSINOPHIL NFR BLD: 5 % (ref 0–7)
EPITH CASTS URNS QL MICRO: ABNORMAL /LPF
ERYTHROCYTE [DISTWIDTH] IN BLOOD BY AUTOMATED COUNT: 13 % (ref 11.5–14.5)
GLOBULIN SER CALC-MCNC: 3.2 G/DL (ref 2–4)
GLUCOSE SERPL-MCNC: 157 MG/DL (ref 65–100)
GLUCOSE UR STRIP.AUTO-MCNC: NEGATIVE MG/DL
HCT VFR BLD AUTO: 36.8 % (ref 35–47)
HGB BLD-MCNC: 12.6 G/DL (ref 11.5–16)
HGB UR QL STRIP: ABNORMAL
IMM GRANULOCYTES # BLD AUTO: 0 K/UL (ref 0–0.04)
IMM GRANULOCYTES NFR BLD AUTO: 0 % (ref 0–0.5)
KETONES UR QL STRIP.AUTO: NEGATIVE MG/DL
LACTATE SERPL-SCNC: 1.3 MMOL/L (ref 0.4–2)
LEUKOCYTE ESTERASE UR QL STRIP.AUTO: ABNORMAL
LYMPHOCYTES # BLD: 2.4 K/UL (ref 0.8–3.5)
LYMPHOCYTES NFR BLD: 44 % (ref 12–49)
MAGNESIUM SERPL-MCNC: 1.9 MG/DL (ref 1.6–2.4)
MCH RBC QN AUTO: 31.2 PG (ref 26–34)
MCHC RBC AUTO-ENTMCNC: 34.2 G/DL (ref 30–36.5)
MCV RBC AUTO: 91.1 FL (ref 80–99)
MONOCYTES # BLD: 0.6 K/UL (ref 0–1)
MONOCYTES NFR BLD: 11 % (ref 5–13)
NEUTS SEG # BLD: 2.2 K/UL (ref 1.8–8)
NEUTS SEG NFR BLD: 39 % (ref 32–75)
NITRITE UR QL STRIP.AUTO: NEGATIVE
NRBC # BLD: 0 K/UL (ref 0–0.01)
NRBC BLD-RTO: 0 PER 100 WBC
P-R INTERVAL, ECG05: 202 MS
PH UR STRIP: 6 [PH] (ref 5–8)
PLATELET # BLD AUTO: 205 K/UL (ref 150–400)
PMV BLD AUTO: 10.3 FL (ref 8.9–12.9)
POTASSIUM SERPL-SCNC: 3.3 MMOL/L (ref 3.5–5.1)
PROT SERPL-MCNC: 6.7 G/DL (ref 6.4–8.2)
PROT UR STRIP-MCNC: NEGATIVE MG/DL
Q-T INTERVAL, ECG07: 360 MS
QRS DURATION, ECG06: 80 MS
QTC CALCULATION (BEZET), ECG08: 447 MS
RBC # BLD AUTO: 4.04 M/UL (ref 3.8–5.2)
RBC #/AREA URNS HPF: ABNORMAL /HPF (ref 0–5)
SODIUM SERPL-SCNC: 144 MMOL/L (ref 136–145)
SP GR UR REFRACTOMETRY: 1.01 (ref 1–1.03)
TROPONIN I SERPL-MCNC: <0.05 NG/ML
UR CULT HOLD, URHOLD: NORMAL
UROBILINOGEN UR QL STRIP.AUTO: 0.2 EU/DL (ref 0.2–1)
VENTRICULAR RATE, ECG03: 93 BPM
WBC # BLD AUTO: 5.5 K/UL (ref 3.6–11)
WBC URNS QL MICRO: ABNORMAL /HPF (ref 0–4)

## 2019-02-11 PROCEDURE — 93005 ELECTROCARDIOGRAM TRACING: CPT

## 2019-02-11 PROCEDURE — 83735 ASSAY OF MAGNESIUM: CPT

## 2019-02-11 PROCEDURE — 99285 EMERGENCY DEPT VISIT HI MDM: CPT

## 2019-02-11 PROCEDURE — 80053 COMPREHEN METABOLIC PANEL: CPT

## 2019-02-11 PROCEDURE — 71046 X-RAY EXAM CHEST 2 VIEWS: CPT

## 2019-02-11 PROCEDURE — 96374 THER/PROPH/DIAG INJ IV PUSH: CPT

## 2019-02-11 PROCEDURE — 81001 URINALYSIS AUTO W/SCOPE: CPT

## 2019-02-11 PROCEDURE — 84484 ASSAY OF TROPONIN QUANT: CPT

## 2019-02-11 PROCEDURE — 87040 BLOOD CULTURE FOR BACTERIA: CPT

## 2019-02-11 PROCEDURE — 83605 ASSAY OF LACTIC ACID: CPT

## 2019-02-11 PROCEDURE — 85025 COMPLETE CBC W/AUTO DIFF WBC: CPT

## 2019-02-11 PROCEDURE — 83880 ASSAY OF NATRIURETIC PEPTIDE: CPT

## 2019-02-11 PROCEDURE — 74011250637 HC RX REV CODE- 250/637: Performed by: EMERGENCY MEDICINE

## 2019-02-11 PROCEDURE — 36415 COLL VENOUS BLD VENIPUNCTURE: CPT

## 2019-02-11 PROCEDURE — 96361 HYDRATE IV INFUSION ADD-ON: CPT

## 2019-02-11 PROCEDURE — 74011250636 HC RX REV CODE- 250/636: Performed by: EMERGENCY MEDICINE

## 2019-02-11 RX ORDER — ONDANSETRON 2 MG/ML
4 INJECTION INTRAMUSCULAR; INTRAVENOUS
Status: COMPLETED | OUTPATIENT
Start: 2019-02-11 | End: 2019-02-11

## 2019-02-11 RX ORDER — POTASSIUM CHLORIDE 750 MG/1
40 TABLET, FILM COATED, EXTENDED RELEASE ORAL
Status: COMPLETED | OUTPATIENT
Start: 2019-02-11 | End: 2019-02-11

## 2019-02-11 RX ORDER — ONDANSETRON 4 MG/1
4 TABLET, ORALLY DISINTEGRATING ORAL
Qty: 10 TAB | Refills: 0 | Status: SHIPPED | OUTPATIENT
Start: 2019-02-11 | End: 2019-03-23

## 2019-02-11 RX ADMIN — POTASSIUM CHLORIDE 40 MEQ: 750 TABLET, FILM COATED, EXTENDED RELEASE ORAL at 03:54

## 2019-02-11 RX ADMIN — SODIUM CHLORIDE 1000 ML: 900 INJECTION, SOLUTION INTRAVENOUS at 02:01

## 2019-02-11 RX ADMIN — ONDANSETRON 4 MG: 2 INJECTION INTRAMUSCULAR; INTRAVENOUS at 02:21

## 2019-02-11 NOTE — ED TRIAGE NOTES
Pt started to feel bad last Thursday, pt was seen and diagnosed with flu. Today pt feels like heart is racing, cough and vomiting 5-6xs at home. Denies cp. Pt has fever and body aches. Fever as high as 101-\"something\". Denies diarrhea.

## 2019-02-11 NOTE — DISCHARGE INSTRUCTIONS
Patient Education   - Zofran as needed for nausea/vomiting.  - Please call Dr. CostaEnrike Captain office today to arrange an Echo. - Return to ED for any concerns. Influenza (Flu): Care Instructions  Your Care Instructions    Influenza (flu) is an infection in the lungs and breathing passages. It is caused by the influenza virus. There are different strains, or types, of the flu virus from year to year. Unlike the common cold, the flu comes on suddenly and the symptoms, such as a cough, congestion, fever, chills, fatigue, aches, and pains, are more severe. These symptoms may last up to 10 days. Although the flu can make you feel very sick, it usually doesn't cause serious health problems. Home treatment is usually all you need for flu symptoms. But your doctor may prescribe antiviral medicine to prevent other health problems, such as pneumonia, from developing. Older people and those who have a long-term health condition, such as lung disease, are most at risk for having pneumonia or other health problems. Follow-up care is a key part of your treatment and safety. Be sure to make and go to all appointments, and call your doctor if you are having problems. It's also a good idea to know your test results and keep a list of the medicines you take. How can you care for yourself at home? · Get plenty of rest.  · Drink plenty of fluids, enough so that your urine is light yellow or clear like water. If you have kidney, heart, or liver disease and have to limit fluids, talk with your doctor before you increase the amount of fluids you drink. · Take an over-the-counter pain medicine if needed, such as acetaminophen (Tylenol), ibuprofen (Advil, Motrin), or naproxen (Aleve), to relieve fever, headache, and muscle aches. Read and follow all instructions on the label. No one younger than 20 should take aspirin. It has been linked to Reye syndrome, a serious illness. · Do not smoke. Smoking can make the flu worse.  If you need help quitting, talk to your doctor about stop-smoking programs and medicines. These can increase your chances of quitting for good. · Breathe moist air from a hot shower or from a sink filled with hot water to help clear a stuffy nose. · Before you use cough and cold medicines, check the label. These medicines may not be safe for young children or for people with certain health problems. · If the skin around your nose and lips becomes sore, put some petroleum jelly on the area. · To ease coughing:  ? Drink fluids to soothe a scratchy throat. ? Suck on cough drops or plain hard candy. ? Take an over-the-counter cough medicine that contains dextromethorphan to help you get some sleep. Read and follow all instructions on the label. ? Raise your head at night with an extra pillow. This may help you rest if coughing keeps you awake. · Take any prescribed medicine exactly as directed. Call your doctor if you think you are having a problem with your medicine. To avoid spreading the flu  · Wash your hands regularly, and keep your hands away from your face. · Stay home from school, work, and other public places until you are feeling better and your fever has been gone for at least 24 hours. The fever needs to have gone away on its own without the help of medicine. · Ask people living with you to talk to their doctors about preventing the flu. They may get antiviral medicine to keep from getting the flu from you. · To prevent the flu in the future, get a flu vaccine every fall. Encourage people living with you to get the vaccine. · Cover your mouth when you cough or sneeze. When should you call for help? Call 911 anytime you think you may need emergency care.  For example, call if:    · You have severe trouble breathing.    Call your doctor now or seek immediate medical care if:    · You have new or worse trouble breathing.     · You seem to be getting much sicker.     · You feel very sleepy or confused.     · You have a new or higher fever.     · You get a new rash.    Watch closely for changes in your health, and be sure to contact your doctor if:    · You begin to get better and then get worse.     · You are not getting better after 1 week. Where can you learn more? Go to http://shiv-lavell.info/. Enter O774 in the search box to learn more about \"Influenza (Flu): Care Instructions. \"  Current as of: September 5, 2018  Content Version: 11.9  © 1331-7032 [employer]. Care instructions adapted under license by [employer] (which disclaims liability or warranty for this information). If you have questions about a medical condition or this instruction, always ask your healthcare professional. [address] any warranty or liability for your use of this information. Patient Education        Hypokalemia: Care Instructions  Your Care Instructions    Hypokalemia (say \"xu-kx-[id number]-GALLO-darryl-uh\") is a low level of potassium. The heart, muscles, kidneys, and nervous system all need potassium to work well. This problem has many different causes. Kidney problems, diet, and medicines like diuretics and laxatives can cause it. So can vomiting or diarrhea. In some cases, cancer is the cause. Your doctor may do tests to find the cause of your low potassium levels. You may need medicines to bring your potassium levels back to normal. You may also need regular blood tests to check your potassium. If you have very low potassium, you may need intravenous (IV) medicines. You also may need tests to check the electrical activity of your heart. Heart problems caused by low potassium levels can be very serious. Follow-up care is a key part of your treatment and safety. Be sure to make and go to all appointments, and call your doctor if you are having problems. It's also a good idea to know your test results and keep a list of the medicines you take.   How can you care for yourself at home? · If your doctor recommends it, eat foods that have a lot of potassium. These include fresh fruits, juices, and vegetables. They also include nuts, beans, and milk. · Be safe with medicines. If your doctor prescribes medicines or potassium supplements, take them exactly as directed. Call your doctor if you have any problems with your medicines. · Get your potassium levels tested as often as your doctor tells you. When should you call for help? Call 911 anytime you think you may need emergency care. For example, call if:    · You feel like your heart is missing beats. Heart problems caused by low potassium can cause death.     · You passed out (lost consciousness).     · You have a seizure.    Call your doctor now or seek immediate medical care if:    · You feel weak or unusually tired.     · You have severe arm or leg cramps.     · You have tingling or numbness.     · You feel sick to your stomach, or you vomit.     · You have belly cramps.     · You feel bloated or constipated.     · You have to urinate a lot.     · You feel very thirsty most of the time.     · You are dizzy or lightheaded, or you feel like you may faint.     · You feel depressed, or you lose touch with reality.    Watch closely for changes in your health, and be sure to contact your doctor if:    · You do not get better as expected. Where can you learn more? Go to http://shiv-lavell.info/. Enter G358 in the search box to learn more about \"Hypokalemia: Care Instructions. \"  Current as of: March 14, 2018  Content Version: 11.9  © 0335-8511 [employer]. Care instructions adapted under license by [employer] (which disclaims liability or warranty for this information). If you have questions about a medical condition or this instruction, always ask your healthcare professional. [address] any warranty or liability for your use of this information.   Patient Education        Nausea and Vomiting: Care Instructions  Your Care Instructions    When you are nauseated, you may feel weak and sweaty and notice a lot of saliva in your mouth. Nausea often leads to vomiting. Most of the time you do not need to worry about nausea and vomiting, but they can be signs of other illnesses. Two common causes of nausea and vomiting are stomach flu and food poisoning. Nausea and vomiting from viral stomach flu will usually start to improve within 24 hours. Nausea and vomiting from food poisoning may last from 12 to 48 hours. The doctor has checked you carefully, but problems can develop later. If you notice any problems or new symptoms, get medical treatment right away. Follow-up care is a key part of your treatment and safety. Be sure to make and go to all appointments, and call your doctor if you are having problems. It's also a good idea to know your test results and keep a list of the medicines you take. How can you care for yourself at home? · To prevent dehydration, drink plenty of fluids, enough so that your urine is light yellow or clear like water. Choose water and other caffeine-free clear liquids until you feel better. If you have kidney, heart, or liver disease and have to limit fluids, talk with your doctor before you increase the amount of fluids you drink. · Rest in bed until you feel better. · When you are able to eat, try clear soups, mild foods, and liquids until all symptoms are gone for 12 to 48 hours. Other good choices include dry toast, crackers, cooked cereal, and gelatin dessert, such as Jell-O. When should you call for help? Call 911 anytime you think you may need emergency care. For example, call if:    · You passed out (lost consciousness).    Call your doctor now or seek immediate medical care if:    · You have symptoms of dehydration, such as:  ? Dry eyes and a dry mouth. ? Passing only a little dark urine. ?  Feeling thirstier than usual.     · You have new or worsening belly pain.     · You have a new or higher fever.     · You vomit blood or what looks like coffee grounds.    Watch closely for changes in your health, and be sure to contact your doctor if:    · You have ongoing nausea and vomiting.     · Your vomiting is getting worse.     · Your vomiting lasts longer than 2 days.     · You are not getting better as expected. Where can you learn more? Go to http://shiv-lavell.info/. Enter 25 [id number] in the search box to learn more about \"Nausea and Vomiting: Care Instructions. \"  Current as of: September 23, 2018  Content Version: 11.9  © 7457-2565 [employer]. Care instructions adapted under license by [email or web address] (which disclaims liability or warranty for this information). If you have questions about a medical condition or this instruction, always ask your healthcare professional. [place] [address] any warranty or liability for your use of this information. Patient Education        Heart Murmur: Care Instructions  Your Care Instructions    A heart murmur is a blowing, whooshing, or rasping sound made by blood moving through the heart or the blood vessels near the heart. Murmurs can be heard through a stethoscope. Heart murmurs can occur during pregnancy or during a temporary illness, such as a fever. These murmurs usually are not a problem and go away on their own. However, sometimes a heart murmur is a sign of a serious problem, such as congenital heart disease or heart valve problems, that may need treatment. You may need more tests to check your heart. The treatment depends on the specific heart problem causing the murmur. Follow-up care is a key part of your treatment and safety. Be sure to make and go to all appointments, and call your doctor if you are having problems. It's also a good idea to know your test results and keep a list of the medicines you take.   How can you care for yourself at home? · Take your medicines exactly as prescribed. Call your doctor if you think you are having a problem with your medicine. You will get more details on the specific medicines your doctor prescribes. · If your doctor recommends it, limit over-the-counter medicines that have stimulants in them. These include decongestants and cold and flu medicines. · If your doctor recommends it, get more exercise. Walking is a good choice. Bit by bit, increase the amount you walk every day. Try for 30 minutes on most days of the week. You also may want to swim, bike, or do other activities. · Do not smoke. Smoking increases your risk of heart attack and stroke. If you need help quitting, talk to your doctor about stop-smoking programs and medicines. These can increase your chances of quitting for good. · Limit alcohol to 2 drinks a day for men and 1 drink a day for women. Alcohol may interfere with some of your medicines. When should you call for help? Call 911 anytime you think you may need emergency care. For example, call if:    · You have severe trouble breathing.     · You cough up pink, foamy mucus and you have trouble breathing.     · You passed out (lost consciousness).     · You have a seizure.     · You have symptoms of a stroke. These may include:  ? Sudden numbness, tingling, weakness, or loss of movement in your face, arm, or leg, especially on only one side of your body. ? Sudden vision changes. ? Sudden trouble speaking. ? Sudden confusion or trouble understanding simple statements. ? Sudden problems with walking or balance. ? A sudden, severe headache that is different from past headaches.    Call your doctor now or seek immediate medical care if:    · You have new or increased shortness of breath.     · You feel dizzy or lightheaded, or you feel like you may faint.     · You have sudden weight gain, such as more than 2 to 3 pounds in a day or 5 pounds in a week.  (Your doctor may suggest a different range of weight gain.)     · You have increased swelling in your legs or feet.     · You have a fever.    Watch closely for changes in your health, and be sure to contact your doctor if you have any problems. Where can you learn more? Go to http://shiv-lavell.info/. Maura Leader in the search box to learn more about \"Heart Murmur: Care Instructions. \"  Current as of: July 22, 2018  Content Version: 11.9  © 7183-2178 [employer]. Care instructions adapted under license by [employer] (which disclaims liability or warranty for this information). If you have questions about a medical condition or this instruction, always ask your healthcare professional. [address] any warranty or liability for your use of this information.

## 2019-02-11 NOTE — ED PROVIDER NOTES
The patient presents to the ED with multiple concerns. She was at her baseline health until Thursday when she developed flu like symptoms. She was diagnosed with influenza A yesterday. She was unable to get Tamiflu anywhere as \"all the pharmacies in [place] are out of Tamiflu. \" She continues to have fever. Tmax 101F. She has headache. She has rhinorrhea, congestion, and cough productive of yellow sputum. She is concerned because she feels her heart rate is elevated. Her symptoms are severe. She feels her heart is going \"very fast.\" She denies any chest pain. She has had nausea and vomiting and has vomited 6 times today. Emesis is non-bloody and non-bilious. She denies any abdominal pain. She complains of  Bodyaches/myalgias. She complains of hallucinations. She drove herself to the ED and \"saw people that weren't there in the road. \" She has been taking Acetaminophen with no relief. She is concerned because she has 6 stents. Last cardiac catheterization was in  - no intervention at that time. Cardiology: Dr Pau Clemens. Past Medical History:  
Diagnosis Date  CAD (coronary artery disease)  Gastrointestinal disorder   
 hiatal hernia  Hypertension  Ill-defined condition   
 vertigo  MI (myocardial infarction) ([facility] [address] Past Surgical History:  
Procedure Laterality Date  CARDIAC SURG PROCEDURE UNLIST  12  
 stents x 3  
 CARDIAC SURG PROCEDURE UNLIST  10/2013  
 stents x 3  
 HX  SECTION    
 three  HX TUBAL LIGATION Family History:  
Problem Relation Age of Onset  Elevated Lipids Father  Heart Disease Father 46  
     heart attack  Elevated Lipids Brother  Elevated Lipids Paternal Grandfather  Heart Disease Paternal Grandfather  Hypertension Paternal Grandfather  Stroke Paternal Grandfather Social History Socioeconomic History  Marital status: LEGALLY  Spouse name: Not on file  Number of children: Not on file  Years of education: Not on file  Highest education level: Not on file Social Needs  Financial resource strain: Not on file  Food insecurity - worry: Not on file  Food insecurity - inability: Not on file  Transportation needs - medical: Not on file  Transportation needs - non-medical: Not on file Occupational History  Not on file Tobacco Use  Smoking status: Former Smoker Packs/day: 2.00 Years: 30.00 Pack years: 60.00 Types: Cigarettes Last attempt to quit: 2012 Years since quittin.7  Smokeless tobacco: Never Used Substance and Sexual Activity  Alcohol use: No  
  Alcohol/week: 0.0 oz  Drug use: No  
 Sexual activity: Yes  
  Partners: Male Other Topics Concern  Not on file Social History Narrative  Not on file ALLERGIES: Erythromycin; Demerol [meperidine]; and Sulfa (sulfonamide antibiotics) Review of Systems Constitutional: Positive for chills, fatigue and fever. Negative for appetite change. HENT: Positive for congestion and rhinorrhea. Negative for nosebleeds and sore throat. Eyes: Negative for discharge and visual disturbance. Respiratory: Positive for cough. Negative for shortness of breath. Cardiovascular: Positive for palpitations. Negative for chest pain and leg swelling. Gastrointestinal: Positive for nausea and vomiting. Negative for abdominal pain and diarrhea. Genitourinary: Negative for dysuria. Musculoskeletal: Positive for myalgias. Skin: Negative for rash. Neurological: Positive for headaches. Negative for weakness. Hematological: Negative for adenopathy. Psychiatric/Behavioral: Negative. All other systems reviewed and are negative. Vitals:  
 19 0121 19 0206 19 0215 BP: 131/73 116/60 116/64 Pulse: 79 75 71 Resp: 18 16 21 Temp: 98 °F (36.7 °C) SpO2: 98% 98% 97% Weight: 84.5 kg (186 lb 4.6 oz) Physical Exam  
Constitutional: She appears well-developed and well-nourished. HENT:  
Head: Normocephalic and atraumatic. Eyes: Conjunctivae are normal.  
Neck: Normal range of motion. Neck supple. Cardiovascular: Normal rate and regular rhythm. Murmur heard. Pulmonary/Chest: Effort normal and breath sounds normal.  
Abdominal: Soft. Bowel sounds are normal.  
Neurological: She is alert. Skin: Skin is warm and dry. Psychiatric: She has a normal mood and affect. Nursing note and vitals reviewed. MDM Procedures ED EKG interpretation: 
Rhythm: normal sinus rhythm; and regular . Rate (approx.): 93; Axis: normal; P wave: normal; QRS interval: normal ; ST/T wave: non-specific changes; PVCs. Interpreted by Arnaldo Perez MD 
 
CONSULT: 
Dr. Judy Turpin - standard discussion. States the patient can follow-up for outpatient Echo. A/P: 
1. Influenza 2. Palpitations 3. Heart murmur - may be related to increased HR with influenza. No evidence of heart failure at this time. 4. N/V - Zofran. IVF given. 5. Hypokalemia - likely from vomiting. KCl given. 6. Anxiety - review of records shows the patient has a lot of anxiety regarding her health and seeks emergency department evaluation frequently. This note will not be viewable in [address]

## 2019-02-16 LAB
BACTERIA SPEC CULT: NORMAL
SERVICE CMNT-IMP: NORMAL

## 2019-03-23 ENCOUNTER — HOSPITAL ENCOUNTER (EMERGENCY)
Age: 54
Discharge: HOME | End: 2019-03-23
Attending: EMERGENCY MEDICINE
Payer: MEDICAID

## 2019-03-23 VITALS
SYSTOLIC BLOOD PRESSURE: 92 MMHG | HEIGHT: 63 IN | BODY MASS INDEX: 32.19 KG/M2 | HEART RATE: 86 BPM | OXYGEN SATURATION: 98 % | DIASTOLIC BLOOD PRESSURE: 53 MMHG | WEIGHT: 181.66 LBS | TEMPERATURE: 99.3 F | RESPIRATION RATE: 16 BRPM

## 2019-03-23 DIAGNOSIS — K52.9 GASTROENTERITIS, ACUTE: Primary | ICD-10-CM

## 2019-03-23 DIAGNOSIS — E86.0 DEHYDRATION: ICD-10-CM

## 2019-03-23 LAB
ALBUMIN SERPL-MCNC: 3.9 G/DL (ref 3.5–5)
ALBUMIN/GLOB SERPL: 1.2 {RATIO} (ref 1.1–2.2)
ALP SERPL-CCNC: 70 U/L (ref 45–117)
ALT SERPL-CCNC: 27 U/L (ref 12–78)
ANION GAP SERPL CALC-SCNC: 12 MMOL/L (ref 5–15)
AST SERPL-CCNC: 29 U/L (ref 15–37)
BASOPHILS # BLD: 0 K/UL (ref 0–0.1)
BASOPHILS NFR BLD: 1 % (ref 0–1)
BILIRUB SERPL-MCNC: 0.5 MG/DL (ref 0.2–1)
BUN SERPL-MCNC: 18 MG/DL (ref 6–20)
BUN/CREAT SERPL: 19 (ref 12–20)
CALCIUM SERPL-MCNC: 9.9 MG/DL (ref 8.5–10.1)
CHLORIDE SERPL-SCNC: 105 MMOL/L (ref 97–108)
CO2 SERPL-SCNC: 26 MMOL/L (ref 21–32)
COMMENT, HOLDF: NORMAL
CREAT SERPL-MCNC: 0.93 MG/DL (ref 0.55–1.02)
DIFFERENTIAL METHOD BLD: NORMAL
EOSINOPHIL # BLD: 0.2 K/UL (ref 0–0.4)
EOSINOPHIL NFR BLD: 2 % (ref 0–7)
ERYTHROCYTE [DISTWIDTH] IN BLOOD BY AUTOMATED COUNT: 12.9 % (ref 11.5–14.5)
GLOBULIN SER CALC-MCNC: 3.3 G/DL (ref 2–4)
GLUCOSE SERPL-MCNC: 98 MG/DL (ref 65–100)
HCT VFR BLD AUTO: 37.5 % (ref 35–47)
HGB BLD-MCNC: 12.8 G/DL (ref 11.5–16)
IMM GRANULOCYTES # BLD AUTO: 0 K/UL (ref 0–0.04)
IMM GRANULOCYTES NFR BLD AUTO: 0 % (ref 0–0.5)
LIPASE SERPL-CCNC: 151 U/L (ref 73–393)
LYMPHOCYTES # BLD: 1.3 K/UL (ref 0.8–3.5)
LYMPHOCYTES NFR BLD: 15 % (ref 12–49)
MCH RBC QN AUTO: 31.1 PG (ref 26–34)
MCHC RBC AUTO-ENTMCNC: 34.1 G/DL (ref 30–36.5)
MCV RBC AUTO: 91 FL (ref 80–99)
MONOCYTES # BLD: 0.5 K/UL (ref 0–1)
MONOCYTES NFR BLD: 7 % (ref 5–13)
NEUTS SEG # BLD: 6.2 K/UL (ref 1.8–8)
NEUTS SEG NFR BLD: 75 % (ref 32–75)
NRBC # BLD: 0 K/UL (ref 0–0.01)
NRBC BLD-RTO: 0 PER 100 WBC
PLATELET # BLD AUTO: 201 K/UL (ref 150–400)
PMV BLD AUTO: 10.5 FL (ref 8.9–12.9)
POTASSIUM SERPL-SCNC: 4.1 MMOL/L (ref 3.5–5.1)
PROT SERPL-MCNC: 7.2 G/DL (ref 6.4–8.2)
RBC # BLD AUTO: 4.12 M/UL (ref 3.8–5.2)
SAMPLES BEING HELD,HOLD: NORMAL
SODIUM SERPL-SCNC: 143 MMOL/L (ref 136–145)
WBC # BLD AUTO: 8.2 K/UL (ref 3.6–11)

## 2019-03-23 PROCEDURE — 99282 EMERGENCY DEPT VISIT SF MDM: CPT

## 2019-03-23 PROCEDURE — 74011000250 HC RX REV CODE- 250: Performed by: EMERGENCY MEDICINE

## 2019-03-23 PROCEDURE — 74011250636 HC RX REV CODE- 250/636: Performed by: EMERGENCY MEDICINE

## 2019-03-23 PROCEDURE — 96374 THER/PROPH/DIAG INJ IV PUSH: CPT

## 2019-03-23 PROCEDURE — 83690 ASSAY OF LIPASE: CPT

## 2019-03-23 PROCEDURE — 96361 HYDRATE IV INFUSION ADD-ON: CPT

## 2019-03-23 PROCEDURE — 85025 COMPLETE CBC W/AUTO DIFF WBC: CPT

## 2019-03-23 PROCEDURE — 80053 COMPREHEN METABOLIC PANEL: CPT

## 2019-03-23 PROCEDURE — 36415 COLL VENOUS BLD VENIPUNCTURE: CPT

## 2019-03-23 PROCEDURE — 96375 TX/PRO/DX INJ NEW DRUG ADDON: CPT

## 2019-03-23 RX ORDER — DIPHENHYDRAMINE HYDROCHLORIDE 50 MG/ML
25 INJECTION, SOLUTION INTRAMUSCULAR; INTRAVENOUS
Status: COMPLETED | OUTPATIENT
Start: 2019-03-23 | End: 2019-03-23

## 2019-03-23 RX ORDER — KETOROLAC TROMETHAMINE 30 MG/ML
30 INJECTION, SOLUTION INTRAMUSCULAR; INTRAVENOUS
Status: COMPLETED | OUTPATIENT
Start: 2019-03-23 | End: 2019-03-23

## 2019-03-23 RX ORDER — ONDANSETRON 8 MG/1
8 TABLET, ORALLY DISINTEGRATING ORAL
Qty: 15 TAB | Refills: 0 | Status: SHIPPED | OUTPATIENT
Start: 2019-03-23

## 2019-03-23 RX ORDER — DICYCLOMINE HYDROCHLORIDE 20 MG/1
20 TABLET ORAL EVERY 6 HOURS
Qty: 20 TAB | Refills: 0 | Status: SHIPPED | OUTPATIENT
Start: 2019-03-23 | End: 2019-10-04

## 2019-03-23 RX ADMIN — KETOROLAC TROMETHAMINE 30 MG: 30 INJECTION, SOLUTION INTRAMUSCULAR; INTRAVENOUS at 21:26

## 2019-03-23 RX ADMIN — PROCHLORPERAZINE EDISYLATE 10 MG: 5 INJECTION INTRAMUSCULAR; INTRAVENOUS at 21:26

## 2019-03-23 RX ADMIN — DIPHENHYDRAMINE HYDROCHLORIDE 25 MG: 50 INJECTION, SOLUTION INTRAMUSCULAR; INTRAVENOUS at 21:26

## 2019-03-23 RX ADMIN — SODIUM CHLORIDE 1000 ML: 900 INJECTION, SOLUTION INTRAVENOUS at 21:26

## 2019-03-24 NOTE — ED PROVIDER NOTES
The patient is a 53-year-old female with a past medical history significant for MI with stents, hypertension, hypercholesterolemia, hiatal hernia, who presents to the ED with a complaint of intractable nausea, vomiting, and diarrhea that began approximately 2-3 hours ago and has been progressively worse. The patient stated that she had [place] food at the day before with her daughter, who also had similar symptoms. The patient is a complaint of dizziness, lightheadedness, abdominal cramps, and night sweats. She denies sore throat, cough, congestion, dysuria, hematuria, vaginal discharge or bleeding, chest pain, shortness of breath, dysuria, melena, hematochezia, hematemesis. Past Medical History:  
Diagnosis Date  CAD (coronary artery disease)  Gastrointestinal disorder   
 hiatal hernia  Hypertension  Ill-defined condition   
 vertigo  MI (myocardial infarction) ([facility] [address] Past Surgical History:  
Procedure Laterality Date  CARDIAC SURG PROCEDURE UNLIST  12  
 stents x 3  
 CARDIAC SURG PROCEDURE UNLIST  10/2013  
 stents x 3  
 HX  SECTION    
 three  HX TUBAL LIGATION Family History:  
Problem Relation Age of Onset  Elevated Lipids Father  Heart Disease Father 46  
     heart attack  Elevated Lipids Brother  Elevated Lipids Paternal Grandfather  Heart Disease Paternal Grandfather  Hypertension Paternal Grandfather  Stroke Paternal Grandfather Social History Socioeconomic History  Marital status: LEGALLY  Spouse name: Not on file  Number of children: Not on file  Years of education: Not on file  Highest education level: Not on file Occupational History  Not on file Social Needs  Financial resource strain: Not on file  Food insecurity:  
  Worry: Not on file Inability: Not on file  Transportation needs:  
  Medical: Not on file Non-medical: Not on file Tobacco Use  
  Smoking status: Former Smoker Packs/day: 2.00 Years: 30.00 Pack years: 60.00 Types: Cigarettes Last attempt to quit: 2012 Years since quittin.8  Smokeless tobacco: Never Used Substance and Sexual Activity  Alcohol use: No  
  Alcohol/week: 0.0 oz  Drug use: No  
 Sexual activity: Yes  
  Partners: Male Lifestyle  Physical activity:  
  Days per week: Not on file Minutes per session: Not on file  Stress: Not on file Relationships  Social connections:  
  Talks on phone: Not on file Gets together: Not on file Attends Temple service: Not on file Active member of club or organization: Not on file Attends meetings of clubs or organizations: Not on file Relationship status: Not on file  Intimate partner violence:  
  Fear of current or ex partner: Not on file Emotionally abused: Not on file Physically abused: Not on file Forced sexual activity: Not on file Other Topics Concern  Not on file Social History Narrative  Not on file ALLERGIES: Erythromycin; Demerol [meperidine]; and Sulfa (sulfonamide antibiotics) Review of Systems All other systems reviewed and are negative. Vitals:  
 19 2111 BP: 120/75 Pulse: 81 Resp: 16 Temp: 99.3 °F (37.4 °C) SpO2: 97% Weight: 82.4 kg (181 lb 10.5 oz) Height: 5' 3\" (1.6 m) Physical Exam  
Nursing note and vitals reviewed. CONSTITUTIONAL: Well-appearing; well-nourished; in no apparent distress HEAD: Normocephalic; atraumatic EYES: PERRL; EOM intact; conjunctiva and sclera are clear bilaterally. ENT: No rhinorrhea; normal pharynx with no tonsillar hypertrophy; mucous membranes pink/moist, no erythema, no exudate. NECK: Supple; non-tender; no cervical lymphadenopathy CARD: Normal S1, S2; no murmurs, rubs, or gallops. Regular rate and rhythm.  
RESP: Normal respiratory effort; breath sounds clear and equal bilaterally; no wheezes, rhonchi, or rales. ABD: Normal bowel sounds; non-distended; non-tender; no palpable organomegaly, no masses, no bruits. Back Exam: Normal inspection; no vertebral point tenderness, no CVA tenderness. Normal range of motion. EXT: Normal ROM in all four extremities; non-tender to palpation; no swelling or deformity; distal pulses are normal, no edema. SKIN: Warm; dry; no rash. NEURO:Alert and oriented x 3, coherent, LINDSAY-XII grossly intact, sensory and motor are non-focal. 
 
MDM Number of Diagnoses or Management Options Diagnosis management comments: Assessment: acute gastroenteritis, secondary to food borne illness. Rule out electrolyte abnormality, and dehydration Plan: lab/ IV fluid/ antiemetic and analgesia/ p.o. challenge/ Monitor and Reevaluate. Amount and/or Complexity of Data Reviewed Clinical lab tests: ordered and reviewed Tests in the radiology section of CPT®: ordered and reviewed Tests in the medicine section of CPT®: reviewed and ordered Discussion of test results with the performing providers: yes Decide to obtain previous medical records or to obtain history from someone other than the patient: yes Obtain history from someone other than the patient: yes Review and summarize past medical records: yes Discuss the patient with other providers: yes Independent visualization of images, tracings, or specimens: yes Risk of Complications, Morbidity, and/or Mortality Presenting problems: moderate Diagnostic procedures: moderate Management options: moderate Procedures Progress Note:  
Pt has been reexamined by Keanu Victor MD. Pt is feeling much better. Symptoms have improved. All available results have been reviewed with pt and any available family. Pt understands sx, dx, and tx in ED. Care plan has been outlined and questions have been answered. Pt is ready to go home. The patient was given p.o. Challenge and she tolerated it well.  She denies any further discomfort at this time. Will send home on acute gastroenteritis, and oral rehydration instructions. Prescription for Zofran and Bentyl. Outpatient referral with PCP as needed. Written by Dina Cleveland MD,9:36 PM 
 
. AnaisFerndalenegin Gonzalez

## 2019-03-24 NOTE — ED TRIAGE NOTES
Triage Note: Patient arrives to ER complaining of vomiting. Began feeling sick this afternoon. Pt complains of feeling weak.

## 2019-03-24 NOTE — DISCHARGE INSTRUCTIONS
Patient Education        Gastroenteritis: Care Instructions  Your Care Instructions    Gastroenteritis is an illness that may cause nausea, vomiting, and diarrhea. It is sometimes called \"stomach flu. \" It can be caused by bacteria or a virus. You will probably begin to feel better in 1 to 2 days. In the meantime, get plenty of rest and make sure you do not become dehydrated. Dehydration occurs when your body loses too much fluid. Follow-up care is a key part of your treatment and safety. Be sure to make and go to all appointments, and call your doctor if you are having problems. It's also a good idea to know your test results and keep a list of the medicines you take. How can you care for yourself at home? · If your doctor prescribed antibiotics, take them as directed. Do not stop taking them just because you feel better. You need to take the full course of antibiotics. · Drink plenty of fluids to prevent dehydration, enough so that your urine is light yellow or clear like water. Choose water and other caffeine-free clear liquids until you feel better. If you have kidney, heart, or liver disease and have to limit fluids, talk with your doctor before you increase your fluid intake. · Drink fluids slowly, in frequent, small amounts, because drinking too much too fast can cause vomiting. · Begin eating mild foods, such as dry toast, yogurt, applesauce, bananas, and rice. Avoid spicy, hot, or high-fat foods, and do not drink alcohol or caffeine for a day or two. Do not drink milk or eat ice cream until you are feeling better. How to prevent gastroenteritis  · Keep hot foods hot and cold foods cold. · Do not eat meats, dressings, salads, or other foods that have been kept at room temperature for more than 2 hours. · Use a thermometer to check your refrigerator. It should be between 34°F and 40°F.  · Defrost meats in the refrigerator or microwave, not on the kitchen counter.   · Keep your hands and your kitchen clean. Wash your hands, cutting boards, and countertops with hot soapy water frequently. · Cook meat until it is well done. · Do not eat raw eggs or uncooked sauces made with raw eggs. · Do not take chances. If food looks or tastes spoiled, throw it out. When should you call for help? Call 911 anytime you think you may need emergency care. For example, call if:    · You vomit blood or what looks like coffee grounds.     · You passed out (lost consciousness).     · You pass maroon or very bloody stools.    Call your doctor now or seek immediate medical care if:    · You have severe belly pain.     · You have signs of needing more fluids. You have sunken eyes, a dry mouth, and pass only a little dark urine.     · You feel like you are going to faint.     · You have increased belly pain that does not go away in 1 to 2 days.     · You have new or increased nausea, or you are vomiting.     · You have a new or higher fever.     · Your stools are black and tarlike or have streaks of blood.    Watch closely for changes in your health, and be sure to contact your doctor if:    · You are dizzy or lightheaded.     · You urinate less than usual, or your urine is dark yellow or brown.     · You do not feel better with each day that goes by. Where can you learn more? Go to http://shiv-lavell.info/. Enter N142 in the search box to learn more about \"Gastroenteritis: Care Instructions. \"  Current as of: July 30, 2018  Content Version: 11.9  © 7203-2202 [employer]. Care instructions adapted under license by [facility] (which disclaims liability or warranty for this information). If you have questions about a medical condition or this instruction, always ask your healthcare professional. [place] [address] any warranty or liability for your use of this information.          Patient Education        Oral Rehydration: Care Instructions  Your Care Instructions    Dehydration occurs when your body loses too much water. This can happen if you do not drink enough fluids or lose a lot of fluid due to diarrhea, vomiting, or sweating. Being dehydrated can cause health problems and can even be life-threatening. To replace lost fluids, you need to drink liquid that contains special chemicals called electrolytes. Electrolytes keep your body working well. Plain water does not have electrolytes. You also need to rest to prevent more fluid loss. Replacing water and electrolytes (oral rehydration) completely takes about 36 hours. But you should feel better within a few hours. Follow-up care is a key part of your treatment and safety. Be sure to make and go to all appointments, and call your doctor if you are having problems. It's also a good idea to know your test results and keep a list of the medicines you take. How can you care for yourself at home? · Take frequent sips of a drink such as Gatorade, Powerade, or other rehydration drinks that your doctor suggests. These replace both fluid and important chemicals (electrolytes) you need for balance in your blood. · Drink 2 quarts of cool liquid over 2 to 4 hours. You should have at least 10 glasses of liquid a day to replace lost fluid. If you have kidney, heart, or liver disease and have to limit fluids, talk with your doctor before you increase the amount of fluids you drink. · Make your own drink. Measure everything carefully. The drink may not work well or may even be harmful if the amounts are off. ? 1 quart water  ? ½ teaspoon salt  ? 6 teaspoons sugar  · Do not drink liquid with caffeine, such as coffee and pietro. · Do not drink any alcohol. It can make you dehydrated. · Drink plenty of fluids, enough so that your urine is light yellow or clear like water. If you have kidney, heart, or liver disease and have to limit fluids, talk with your doctor before you increase the amount of fluids you drink.   When should you call for help? Call 911 anytime you think you may need emergency care. For example, call if:    · You have signs of severe dehydration, such as:  ? You are confused or unable to stay awake.  ? You passed out (lost consciousness).    Call your doctor now or seek immediate medical care if:    · You still have signs of dehydration. You have sunken eyes and a dry mouth, and you pass only a little dark urine.     · You are dizzy or lightheaded, or you feel like you may faint.     · You are not able to keep down fluids.    Watch closely for changes in your health, and be sure to contact your doctor if:    · You do not get better as expected. Where can you learn more? Go to http://shiv-lavell.info/. Enter I040 in the search box to learn more about \"Oral Rehydration: Care Instructions. \"  Current as of: September 23, 2018  Content Version: 11.9  © 7855-9072 [employer]. Care instructions adapted under license by [employer] (which disclaims liability or warranty for this information). If you have questions about a medical condition or this instruction, always ask your healthcare professional. [place] [address] any warranty or liability for your use of this information.

## 2019-03-24 NOTE — ED NOTES
Patient states she thinks she is the nausea is getting better. VSS, call bell within reach, warm blankets provided.

## 2019-10-04 ENCOUNTER — APPOINTMENT (OUTPATIENT)
Dept: GENERAL RADIOLOGY | Age: 54
End: 2019-10-04
Attending: EMERGENCY MEDICINE
Payer: MEDICAID

## 2019-10-04 ENCOUNTER — HOSPITAL ENCOUNTER (EMERGENCY)
Age: 54
Discharge: HOME | End: 2019-10-04
Attending: EMERGENCY MEDICINE
Payer: MEDICAID

## 2019-10-04 VITALS
SYSTOLIC BLOOD PRESSURE: 131 MMHG | TEMPERATURE: 98.6 F | HEART RATE: 70 BPM | WEIGHT: 179.68 LBS | BODY MASS INDEX: 30.67 KG/M2 | HEIGHT: 64 IN | RESPIRATION RATE: 18 BRPM | DIASTOLIC BLOOD PRESSURE: 84 MMHG | OXYGEN SATURATION: 100 %

## 2019-10-04 DIAGNOSIS — M79.672 LEFT FOOT PAIN: Primary | ICD-10-CM

## 2019-10-04 PROCEDURE — 99283 EMERGENCY DEPT VISIT LOW MDM: CPT

## 2019-10-04 PROCEDURE — 77030036687 HC SHOE PSTOP S2SG -A

## 2019-10-04 PROCEDURE — 73630 X-RAY EXAM OF FOOT: CPT

## 2019-10-04 RX ORDER — ACETAMINOPHEN AND CODEINE PHOSPHATE 300; 30 MG/1; MG/1
1 TABLET ORAL
Qty: 5 TAB | Refills: 0 | Status: SHIPPED | OUTPATIENT
Start: 2019-10-04 | End: 2019-10-07

## 2019-10-04 RX ORDER — METOPROLOL SUCCINATE 50 MG/1
50 TABLET, EXTENDED RELEASE ORAL DAILY
COMMUNITY
End: 2021-08-20

## 2019-10-04 RX ORDER — CEPHALEXIN 500 MG/1
500 CAPSULE ORAL 3 TIMES DAILY
Qty: 21 CAP | Refills: 0 | Status: SHIPPED | OUTPATIENT
Start: 2019-10-04 | End: 2019-10-11

## 2019-10-04 NOTE — DISCHARGE INSTRUCTIONS

## 2019-11-01 ENCOUNTER — OFFICE VISIT (OUTPATIENT)
Dept: HEMATOLOGY | Age: 54
End: 2019-11-01

## 2019-11-01 VITALS
DIASTOLIC BLOOD PRESSURE: 68 MMHG | RESPIRATION RATE: 16 BRPM | WEIGHT: 178 LBS | BODY MASS INDEX: 30.39 KG/M2 | OXYGEN SATURATION: 98 % | SYSTOLIC BLOOD PRESSURE: 115 MMHG | HEIGHT: 64 IN | TEMPERATURE: 97.7 F | HEART RATE: 64 BPM

## 2019-11-01 DIAGNOSIS — R74.8 ELEVATED LIVER ENZYMES: Primary | ICD-10-CM

## 2019-11-01 PROBLEM — Z95.5 S/P CORONARY ARTERY STENT PLACEMENT: Status: ACTIVE | Noted: 2019-11-01

## 2019-11-01 PROBLEM — K21.9 GERD (GASTROESOPHAGEAL REFLUX DISEASE): Status: ACTIVE | Noted: 2019-11-01

## 2019-11-01 LAB
BASOPHILS # BLD AUTO: 0.1 X10E3/UL (ref 0–0.2)
BASOPHILS NFR BLD AUTO: 1 %
EOSINOPHIL # BLD AUTO: 0.2 X10E3/UL (ref 0–0.4)
EOSINOPHIL NFR BLD AUTO: 4 %
ERYTHROCYTE [DISTWIDTH] IN BLOOD BY AUTOMATED COUNT: 13 % (ref 12.3–15.4)
HCT VFR BLD AUTO: 38.7 % (ref 34–46.6)
HGB BLD-MCNC: 12.5 G/DL (ref 11.1–15.9)
IMM GRANULOCYTES # BLD AUTO: 0 X10E3/UL (ref 0–0.1)
IMM GRANULOCYTES NFR BLD AUTO: 0 %
LYMPHOCYTES # BLD AUTO: 2.3 X10E3/UL (ref 0.7–3.1)
LYMPHOCYTES NFR BLD AUTO: 40 %
MCH RBC QN AUTO: 30.1 PG (ref 26.6–33)
MCHC RBC AUTO-ENTMCNC: 32.3 G/DL (ref 31.5–35.7)
MCV RBC AUTO: 93 FL (ref 79–97)
MONOCYTES # BLD AUTO: 0.4 X10E3/UL (ref 0.1–0.9)
MONOCYTES NFR BLD AUTO: 7 %
NEUTROPHILS # BLD AUTO: 2.7 X10E3/UL (ref 1.4–7)
NEUTROPHILS NFR BLD AUTO: 48 %
PLATELET # BLD AUTO: 249 X10E3/UL (ref 150–450)
RBC # BLD AUTO: 4.15 X10E6/UL (ref 3.77–5.28)
WBC # BLD AUTO: 5.7 X10E3/UL (ref 3.4–10.8)

## 2019-11-01 RX ORDER — POTASSIUM CHLORIDE 1500 MG/1
TABLET, EXTENDED RELEASE ORAL
Refills: 3 | COMMUNITY
Start: 2019-09-30 | End: 2021-08-20

## 2019-11-01 RX ORDER — DICLOFENAC SODIUM 10 MG/G
GEL TOPICAL
Refills: 1 | COMMUNITY
Start: 2019-10-14

## 2019-11-01 RX ORDER — TRAMADOL HYDROCHLORIDE 50 MG/1
TABLET ORAL
Refills: 0 | COMMUNITY
Start: 2019-10-10 | End: 2021-10-05

## 2019-11-01 RX ORDER — CITALOPRAM 40 MG/1
TABLET, FILM COATED ORAL
Refills: 5 | COMMUNITY
Start: 2019-10-12 | End: 2019-11-01

## 2019-11-01 RX ORDER — MECLIZINE HYDROCHLORIDE 25 MG/1
TABLET ORAL
Refills: 0 | COMMUNITY
Start: 2019-10-22 | End: 2020-06-23 | Stop reason: CLARIF

## 2019-11-01 NOTE — Clinical Note
12/10/19 Patient: Jordan Acevedo YOB: 1965 Date of Visit: 11/1/2019 Binh Lester MD 
[facility] [address] [address] VIA Facsimile: [phone] Dear Binh Lester MD, Thank you for referring Ms. Marya Moreau to [facility] for evaluation. My notes for this consultation are attached. If you have questions, please do not hesitate to call me. I look forward to following your patient along with you. Sincerely, Marleny Gunderson MD

## 2019-11-01 NOTE — PROGRESS NOTES
[address] [place] [address], Sonja STERN Karol Grime, MD Devota Lacrosse, MERON Barcenas, [facility]     April S Courtney, [facility]   Pebbles Simon FNP-C    Lana Garcia, [facility]       Barbara Mejia [facility]    at [address]    217 [facility], [address] [place] [address], [facility] 22.    [phone]    FAX: [address] [place] [address], [address]    [phone]    FAX: [phone]       Patient Care Team:  Darlin Padgett MD as PCP - General (Internal Medicine)  Quyen Lopes MD as Physician (Gastroenterology)  Carson Del Cid MD (Cardiology)  Dominga Dai MD (Cardiology)  Haroldo Kam MD (Cardiology)  Luis Carcamo MD (Cardiology)      Problem List  Date Reviewed: 11/1/2019          Codes Class Noted    GERD (gastroesophageal reflux disease) ICD-10-CM: K21.9  ICD-9-CM: 530.81  11/1/2019        S/P coronary artery stent placement ICD-10-CM: Z95.5  ICD-9-CM: V45.82  11/1/2019        Vocal cord nodules ICD-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 Widely patent LCX stents, 30-40% prox LAD stenosis with widely paten mid vessel stent. RCA 50% ostial, 75%prox,           70% mid and 90% prox GURWINDER. 4 by 20mm ostial queta with overlapping 4 by 32mm queta in prox/mid vessel.  2.5 by 18mm queta to prox GURWINDER.    6/12 normal carotid dopplers  9/14 bilateral 10-49% carotid stenoses  2/13 normal stress cardiolyte lvef 65%  1/14 normal stress echo  10/14 normal stress echo               Familial hypercholesterolemia ICD-10-CM: E78.01  ICD-9-CM: 272.0  9/4/2015              The clinicians listed above have asked me to see Linh Bernard in consultation regarding elevated liver enzymes and its management. All medical records sent by the referring physicians were reviewed     The patient is a 48 y.o.  female who was found to have elevated  liver transaminases in 8/2019. Serologic evaluation for markers of chronic liver disease has either not been performed or the results are not available. Imaging of the liver was not performed. An assessment of liver fibrosis with biopsy or elastography has not been performed. She has a history of CAD with previous MI and stents and is on a high dose of statins. The patient has the following symptoms which are thought to be due to the liver disease:  Some fatigue,     The patient has not experienced the following symptoms:  pain in the right side over the liver,     The patient has limitations in functional activities which can be attributed to other medical problems that are not related to the liver disease. ASSESSMENT AND PLAN:  Elevated liver enzymes  Have performed laboratory testing to monitor liver function and degree of liver injury. This included BMP, hepatic panel, CBC with platelet count, INR. AST is normal.  ALT is elevated. ALP is normal.  Liver function is normal.  The platelet count is normal.      Will perform imaging of the liver with ultrasound. Based upon laboratory studies and imaging  the patient does not appear to have significant liver injury. Serologic testing for causes of chronic liver disease were ordered.   All were negative     The most likely causes for the liver chemistry abnormalities were discussed with the patient and include fatty liver disease,     The need to perform an assessment of liver fibrosis was discussed with the patient. The Fibroscan can assess liver fibrosis and determine if a patient has advanced fibrosis or cirrhosis without the need for liver biopsy. This will be performed at the next office visit. If the Fibroscan suggests advanced fibrosis then a liver biopsy should be considered. The Fibroscan can be repeated annually or as often as clinically indicated to assess for fibrosis progression and/or regression. Screening for Hepatocellular Carcinoma  HCC screening is not necessary if the patient has no evidence of cirrhosis. Treatment of other medical problems in patients with chronic liver disease  There are no contraindications for the patient to take most medications that are necessary for treatment of other medical issues. The patient can take Any medications utilized for treatment of DM, Statins to treat hypercholesterolemia    The patient does not comsume alcohol on a daily basis. Normal doses of acetaminophen, as recommended on the label of the bottle, are not hepatotoxic except in the setting of daily alcohol use, even in patients with cirrhosis and can be utilized for pain. Counseling for alcohol in patients with chronic liver disease  The patient was counseled regarding alcohol consumption and the effect of alcohol on chronic liver disease. The patient does not consume any significant amount of alcohol. Vaccinations   The need for vaccination against viral hepatitis A and B will be assessed with serologic and instituted as appropriate. Routine vaccinations against other bacterial and viral agents can be performed as indicated. Annual flu vaccination should be administered if indicated.       ALLERGIES  Allergies   Allergen Reactions    Erythromycin Anaphylaxis    Demerol [Meperidine] Other (comments)     hallucination    Sulfa (Sulfonamide Antibiotics) Other (comments) MEDICATIONS  Current Outpatient Medications   Medication Sig    diclofenac (VOLTAREN) 1 % gel APPLY 1 GRAM TO AFFECTED AREA 3 TIMES A DAY AS NEEDED    meclizine (ANTIVERT) 25 mg tablet TAKE 1 TABLET THREE TIMES A DAY AS NEEDED FOR DIZZINESS AND NAUSEA ORALLY    KLOR-CON M20 20 mEq tablet TAKE 1 TABLET BY MOUTH TWICE A DAY WITH FOOD    traMADol (ULTRAM) 50 mg tablet TAKE 1 TABLET BY MOUTH EVERY 6 TO 8 HRS AS NEEDED FOR PAIN    metoprolol succinate (TOPROL-XL) 50 mg XL tablet Take 50 mg by mouth daily.  ondansetron (ZOFRAN ODT) 8 mg disintegrating tablet Take 1 Tab by mouth every eight (8) hours as needed for Nausea.  pantoprazole (PROTONIX) 40 mg tablet Take 40 mg by mouth daily.  ALPRAZolam (XANAX) 1 mg tablet Take 1 mg by mouth three (3) times daily as needed for Anxiety.  ezetimibe (ZETIA) 10 mg tablet Take 10 mg by mouth daily.  aspirin delayed-release 81 mg tablet TAKE 2 TABLETS BY MOUTH DAILY    SOTALOL HCL (SOTALOL PO) Take 80 mg by mouth two (2) times a day.  temazepam (RESTORIL) 30 mg capsule Take 30 mg by mouth nightly as needed for Sleep.  rosuvastatin (CRESTOR) 40 mg tablet Take 40 mg by mouth nightly.  fenofibrate (LOFIBRA) 160 mg tablet Take 1 Tab by mouth daily.  lisinopril (PRINIVIL, ZESTRIL) 5 mg tablet Take 1 Tab by mouth daily.  clopidogrel (PLAVIX) 75 mg tablet Take 1 Tab by mouth daily.  OTHER,NON-FORMULARY, Pt takes a \"GI Cocktail\" formulated by Dr. Suhail White     No current facility-administered medications for this visit. SYSTEM REVIEW NOT RELATED TO LIVER DISEASE OR REVIEWED ABOVE:  Constitution systems: Negative for fever, chills, weight gain, weight loss. Eyes: Negative for visual changes. ENT: Negative for sore throat, painful swallowing. Respiratory: Negative for cough, hemoptysis, SOB. Cardiology: Negative for chest pain, palpitations. GI:  Negative for constipation or diarrhea.   : Negative for urinary frequency, dysuria, hematuria, nocturia. Skin: Negative for rash. Hematology: Negative for easy bruising, blood clots. Musculo-skelatal: Negative for back pain, muscle pain, weakness. Neurologic: Negative for headaches, dizziness, vertigo, memory problems not related to HE. Psychology: Negative for anxiety, depression. FAMILY HISTORY:  The father  Has/had the following following chronic disease(s): CABG. The mother Has/had the following chronic disease(s): Chrons disease  There is no family history of liver disease. SOCIAL HISTORY:  The patient is . The patient has 3 children,   The patient has never used tobacco products. The patient has never consumed significant amounts of alcohol. The patient used to work as . The patient has not worked since 2013. PHYSICAL EXAMINATION:  Visit Vitals  /68   Pulse 64   Temp 97.7 °F (36.5 °C) (Tympanic)   Resp 16   Ht 5' 3.5\" (1.613 m)   Wt 178 lb (80.7 kg)   SpO2 98%   BMI 31.04 kg/m²     General: No acute distress. Eyes: Sclera anicteric. ENT: No oral lesions. Thyroid normal.  Nodes: No adenopathy. Skin: No spider angiomata. No jaundice. No palmar erythema. Respiratory: Lungs clear to auscultation. Cardiovascular: Regular heart rate. No murmurs. No JVD. Abdomen: Soft non-tender. Liver size normal to percussion/palpation. Spleen not palpable. No obvious ascites. Extremities: No edema. No muscle wasting. No gross arthritic changes. Neurologic: Alert and oriented. Cranial nerves grossly intact. No asterixis.     LABORATORY STUDIES:  Liver [place] of [address] Units 11/1/2019 3/23/2019   WBC 3.4 - 10.8 x10E3/uL 5.7 8.2   ANC 1.4 - 7.0 x10E3/uL 2.7 6.2   HGB 11.1 - 15.9 g/dL 12.5 12.8   HGB 11.5 - 16.0 GM/DL     HGB (iSTAT) 11.5 - 16.0 GM/DL      - 450 x10E3/uL 249 201   INR 0.8 - 1.2 1.1    AST 0 - 40 IU/L 21 29   ALT 0 - 32 IU/L 21 27   Alk Phos 39 - 117 IU/L 63 70   Bili, Total 0.0 - 1.2 mg/dL 0.4 0.5   Bili, Direct 0.00 - 0.40 mg/dL 0.15    Albumin 3.5 - 5.5 g/dL 4.4 3.9   BUN 6 - 24 mg/dL 16 18   BUN (iSTAT) 9 - 20 MG/DL     Creat 0.57 - 1.00 mg/dL 0.83 0.93   Creat (iSTAT) 0.6 - 1.3 MG/DL     Na 134 - 144 mmol/L 145 (H) 143   K 3.5 - 5.2 mmol/L 4.5 4.1   Cl 96 - 106 mmol/L 107 (H) 105   CO2 20 - 29 mmol/L 25 26   Glucose 65 - 99 mg/dL 94 98     SEROLOGIES:  Serologies Latest Ref Rng & Units 12/5/2019 11/1/2019   Hep B Surface Ag Negative  Negative   Hep C Ab 0.0 - 0.9 s/co ratio  <0.1   Ferritin 15 - 150 ng/mL 70    Iron % Saturation 15 - 55 % 20    EMILIANO Ab, Direct Negative Negative    Alpha-1 antitrypsin level 101 - 187 mg/dL  135     LIVER HISTOLOGY:  Not available or performed    ENDOSCOPIC PROCEDURES:  Not available or performed    RADIOLOGY:  11/2019. Ultrasound of liver. Normal appearing liver. No liver mass lesions. OTHER TESTING:  Not available or performed    FOLLOW-UP:  All of the issues listed above in the Assessment and Plan were discussed with the patient. All questions were answered. The patient expressed a clear understanding of the above. [address] [place] [address] 87 in 4 weeks for Fibroscan to review all data and determine the treatment plan.       Ja Page MD  [address], [address] [place] [address] 22.  [phone]  [address]

## 2019-11-02 LAB
A1AT SERPL-MCNC: 135 MG/DL (ref 101–187)
ALBUMIN SERPL-MCNC: 4.4 G/DL (ref 3.5–5.5)
ALP SERPL-CCNC: 63 IU/L (ref 39–117)
ALT SERPL-CCNC: 21 IU/L (ref 0–32)
AST SERPL-CCNC: 21 IU/L (ref 0–40)
BILIRUB DIRECT SERPL-MCNC: 0.15 MG/DL (ref 0–0.4)
BILIRUB SERPL-MCNC: 0.4 MG/DL (ref 0–1.2)
BUN SERPL-MCNC: 16 MG/DL (ref 6–24)
BUN/CREAT SERPL: 19 (ref 9–23)
CALCIUM SERPL-MCNC: 9.8 MG/DL (ref 8.7–10.2)
CHLORIDE SERPL-SCNC: 107 MMOL/L (ref 96–106)
CO2 SERPL-SCNC: 25 MMOL/L (ref 20–29)
COMMENT, 144067: NORMAL
CREAT SERPL-MCNC: 0.83 MG/DL (ref 0.57–1)
GLUCOSE SERPL-MCNC: 94 MG/DL (ref 65–99)
HBV SURFACE AG SERPL QL IA: NEGATIVE
HCV AB S/CO SERPL IA: <0.1 S/CO RATIO (ref 0–0.9)
INR PPP: 1.1 (ref 0.8–1.2)
POTASSIUM SERPL-SCNC: 4.5 MMOL/L (ref 3.5–5.2)
PROT SERPL-MCNC: 6.8 G/DL (ref 6–8.5)
PROTHROMBIN TIME: 11.2 SEC (ref 9.1–12)
SODIUM SERPL-SCNC: 145 MMOL/L (ref 134–144)

## 2019-11-05 NOTE — PROGRESS NOTES
Per Dr. Nick Ramos, reviewed results with patient. Patient had no further questions. Follow up appointment given.

## 2019-11-14 ENCOUNTER — HOSPITAL ENCOUNTER (OUTPATIENT)
Dept: ULTRASOUND IMAGING | Age: 54
Discharge: HOME | End: 2019-11-14
Attending: INTERNAL MEDICINE
Payer: MEDICAID

## 2019-11-14 DIAGNOSIS — R74.8 ELEVATED LIVER ENZYMES: ICD-10-CM

## 2019-11-14 PROCEDURE — 76705 ECHO EXAM OF ABDOMEN: CPT

## 2019-12-05 ENCOUNTER — OFFICE VISIT (OUTPATIENT)
Dept: HEMATOLOGY | Age: 54
End: 2019-12-05

## 2019-12-05 VITALS
WEIGHT: 179 LBS | HEIGHT: 63 IN | DIASTOLIC BLOOD PRESSURE: 67 MMHG | BODY MASS INDEX: 31.71 KG/M2 | TEMPERATURE: 98.1 F | SYSTOLIC BLOOD PRESSURE: 120 MMHG | HEART RATE: 67 BPM | OXYGEN SATURATION: 99 %

## 2019-12-05 DIAGNOSIS — R79.89 ELEVATED LFTS: Primary | ICD-10-CM

## 2019-12-05 NOTE — LETTER
12/6/19 Patient: Rito Doan YOB: 1965 Date of Visit: 12/5/2019 Gregorio Bryan MD 
[facility] [address] [facility] 7 [address] VIA Facsimile: [phone] Dear Gregorio Bryan MD, Thank you for referring Ms. Deshaun Londono to [address] [facility] for evaluation. My notes for this consultation are attached. If you have questions, please do not hesitate to call me. I look forward to following your patient along with you. Sincerely, Paola Krishnamurthy NP

## 2019-12-05 NOTE — PROGRESS NOTES
Chief Complaint   Patient presents with    Other     fibroscan     3 most recent PHQ Screens 11/1/2019   PHQ Not Done Active Diagnosis of Depression or Bipolar Disorder   Little interest or pleasure in doing things -   Feeling down, depressed, irritable, or hopeless -   Total Score PHQ 2 -     Abuse Screening Questionnaire 11/1/2019   Do you ever feel afraid of your partner? N   Are you in a relationship with someone who physically or mentally threatens you? N   Is it safe for you to go home? Y       Learning Assessment 12/5/2019   PRIMARY LEARNER Patient   HIGHEST LEVEL OF EDUCATION - PRIMARY LEARNER  -   BARRIERS PRIMARY LEARNER NONE   CO-LEARNER CAREGIVER No   PRIMARY LANGUAGE ENGLISH    NEED -   LEARNER PREFERENCE PRIMARY DEMONSTRATION   LEARNING SPECIAL TOPICS -   ANSWERED BY patient     -   RELATIONSHIP SELF   ASSESSMENT COMMENT -     Visit Vitals  /67 (BP 1 Location: Left arm, BP Patient Position: Sitting)   Pulse 67   Temp 98.1 °F (36.7 °C) (Tympanic)   Ht 5' 3\" (1.6 m)   Wt 179 lb (81.2 kg)   SpO2 99%   BMI 31.71 kg/m²     1. Have you been to the ER, urgent care clinic since your last visit? Hospitalized since your last visit? No    2. Have you seen or consulted any other health care providers outside of the [address] [place] [address] since your last visit? Include any pap smears or colon screening.  No

## 2019-12-05 NOTE — PROGRESS NOTES
[address] [facility], MD, Jobe Eaves, Serafina Closs, MD Dalene Kindle, MERON Ambriz, [facility]     April S Courtney, [facility]   ELIZABETH Byrd-GUERO Flores, [facility]       Barbara Mejia [facility] 136    at [address], [address] [place] [address], [facility] 22.    [phone]    FAX: [address] [place] [address] Drive, [address], [address]    [phone]    FAX: [phone]     Patient Care Team:  Linh Rawls MD as PCP - General (Internal Medicine)  Sheldon Portillo MD as Physician (Gastroenterology)  Florence Schultz MD (Cardiology)  Marah Billings MD (Cardiology)  Ave Miller MD (Cardiology)  Olivia Leblanc MD (Cardiology)    Problem List  Date Reviewed: 11/1/2019          Codes Class Noted    GERD (gastroesophageal reflux disease) ICD-10-CM: K21.9  ICD-9-CM: 530.81  11/1/2019        S/P coronary artery stent placement ICD-10-CM: Z95.5  ICD-9-CM: V45.82  11/1/2019        Vocal cord nodules ICD-10-CM: J38.2  ICD-9-CM: 478.5  11/17/2015        Generalized anxiety disorder ICD-10-CM: F41.1  ICD-9-CM: 300.02  9/11/2015        Carotid artery disease without cerebral infarction [facility]) ICD-10-CM: I73.9  ICD-9-CM: 447.9  9/4/2015        CAD (coronary artery disease) ICD-10-CM: I25.10  ICD-9-CM: 414.00  9/4/2015    Overview Addendum 11/6/2015  2:52 PM by Florence Schultz MD     6/12 acute mi, stent to occluded lcx    10/13 unstable angina, stents to rca. Widely patent LCX stents, 30-40% prox LAD stenosis with widely paten mid vessel stent. RCA 50% ostial, 75%prox,           70% mid and 90% prox GURWINDER. 4 by 20mm ostial queta with overlapping 4 by 32mm queta in prox/mid vessel.  2.5 by 18mm queta to prox GURWINDER.    6/12 normal carotid dopplers  9/14 bilateral 10-49% carotid stenoses  2/13 normal stress cardiolyte lvef 65%  1/14 normal stress echo  10/14 normal stress echo               Familial hypercholesterolemia ICD-10-CM: E78.01  ICD-9-CM: 272.0  9/4/2015            Jose Napier returns to the [address] for management of elevated liver enzymes. The active problem list, all pertinent past medical history, medications, radiologic findings and laboratory findings related to the liver disorder were reviewed with the patient. The patient is a 48 y.o.  female who was found to have elevated liver transaminases in 2019. Serologic evaluation for markers of chronic liver disease was negative for A1A, HCV antibody and acute hepatitis B. Ultrasound of the liver shows a normal appearing liver. An assessment of liver fibrosis with elastography will be performed this visit. The patient has the following symptoms which are thought to be due to the liver disease: fatigue. The patient has not experienced the following symptoms: pain in the right side over the liver. The patient has limitations in functional activities which can be attributed to other medical problems not related to the liver disease. She is very concerned about her labs and ultrasound and wants to know the results. ASSESSMENT AND PLAN:  Elevated liver enzymes  Intermittent elevation in liver transaminases of unclear etiology at this time. I'm not overly concerned as her LFTs are normal and imaging and FibroScan are both normal. Neither even show signs of fatty liver disease. I do believe she needs to lose weight, regardless of fatty liver disease status and we discussed this. I am happy to see her again if the LFTs elevate again but I do not think any changes need to occur to her cardiac/cholesterol medications. We can repeat her FibroScan annually to assess for any progression of fibrosis.      Liver transaminases are normal. ALP is normal. Liver function is normal. Total bilirubin is normal. Serum albumin is normal. The platelet count is normal.     Based upon laboratory studies, FibroScan and imaging, the patient does not appear to have advanced liver disease. Serologic testing for causes of chronic liver disease was negative for A1A, HCV antibody and acute hepatitis B. Will perform laboratory testing to monitor liver function and degree of liver injury. This included BMP, hepatic panel and CBC with platelet count. Screening for hepatocellular carcinoma  HCC screening is not necessary if the patient has no evidence of cirrhosis. Treatment of other medical problems in patients with chronic liver disease  There are no contraindications for the patient to take most medications necessary for treatment of other medical issues. The patient can take any medications utilized for treatment of DM and/or statins to treat hypercholesterolemia. The patient does not consume alcohol on a daily basis. Normal doses of acetaminophen, as recommended on the label of the bottle, are not hepatotoxic except in the setting of daily alcohol use. Even patients with cirrhosis can utilize acetaminophen for pain. Counseling for alcohol in patients with chronic liver disease  The patient was counseled regarding alcohol consumption and the effect of alcohol on chronic liver disease. The patient does not consume any significant amount of alcohol. Vaccinations   The need for vaccination against viral hepatitis A and B will be assessed with serologic and instituted as appropriate. Routine vaccinations against other bacterial and viral agents can be performed as indicated. Annual flu vaccination should be administered if indicated.     ALLERGIES  Allergies   Allergen Reactions    Erythromycin Anaphylaxis    Demerol [Meperidine] Other (comments)     hallucination    Sulfa (Sulfonamide Antibiotics) Other (comments)     MEDICATIONS  Current Outpatient Medications   Medication Sig    diclofenac (VOLTAREN) 1 % gel APPLY 1 GRAM TO AFFECTED AREA 3 TIMES A DAY AS NEEDED    meclizine (ANTIVERT) 25 mg tablet TAKE 1 TABLET THREE TIMES A DAY AS NEEDED FOR DIZZINESS AND NAUSEA ORALLY    KLOR-CON M20 20 mEq tablet TAKE 1 TABLET BY MOUTH TWICE A DAY WITH FOOD    traMADol (ULTRAM) 50 mg tablet TAKE 1 TABLET BY MOUTH EVERY 6 TO 8 HRS AS NEEDED FOR PAIN    metoprolol succinate (TOPROL-XL) 50 mg XL tablet Take 50 mg by mouth daily.  ondansetron (ZOFRAN ODT) 8 mg disintegrating tablet Take 1 Tab by mouth every eight (8) hours as needed for Nausea.  pantoprazole (PROTONIX) 40 mg tablet Take 40 mg by mouth daily.  ALPRAZolam (XANAX) 1 mg tablet Take 1 mg by mouth three (3) times daily as needed for Anxiety.  ezetimibe (ZETIA) 10 mg tablet Take 10 mg by mouth daily.  aspirin delayed-release 81 mg tablet TAKE 2 TABLETS BY MOUTH DAILY    SOTALOL HCL (SOTALOL PO) Take 80 mg by mouth two (2) times a day.  temazepam (RESTORIL) 30 mg capsule Take 30 mg by mouth nightly as needed for Sleep.  rosuvastatin (CRESTOR) 40 mg tablet Take 40 mg by mouth nightly.  fenofibrate (LOFIBRA) 160 mg tablet Take 1 Tab by mouth daily.  lisinopril (PRINIVIL, ZESTRIL) 5 mg tablet Take 1 Tab by mouth daily.  clopidogrel (PLAVIX) 75 mg tablet Take 1 Tab by mouth daily.  OTHER,NON-FORMULARY, Pt takes a \"GI Cocktail\" formulated by Dr. Shelly Sellers     No current facility-administered medications for this visit. SYSTEM REVIEW NOT RELATED TO LIVER DISEASE OR REVIEWED ABOVE:  Constitution systems: Negative for fever, chills, weight gain, weight loss. Eyes: Negative for visual changes. ENT: Negative for sore throat, painful swallowing. Respiratory: Negative for cough, hemoptysis, SOB. Cardiology: Negative for chest pain, palpitations. GI:  Negative for constipation or diarrhea. : Negative for urinary frequency, dysuria, hematuria, nocturia.    Skin: Negative for rash. Hematology: Negative for easy bruising, blood clots. Musculo-skeletal: Negative for back pain, muscle pain, weakness. Neurologic: Negative for headaches, dizziness, vertigo, memory problems not related to HE. Psychology: Negative for anxiety, depression. FAMILY HISTORY:  The father has/had the following chronic disease(s): CABG. The mother has/had the following chronic disease(s): Crohn's disease  There is no family history of liver disease. SOCIAL HISTORY:  The patient is . The patient has 3 children. The patient has never used tobacco products. The patient has never consumed significant amounts of alcohol. The patient used to work as . The patient has not worked since 2013. PHYSICAL EXAMINATION:  Visit Vitals  /67 (BP 1 Location: Left arm, BP Patient Position: Sitting)   Pulse 67   Temp 98.1 °F (36.7 °C) (Tympanic)   Ht 5' 3\" (1.6 m)   Wt 179 lb (81.2 kg)   SpO2 99%   BMI 31.71 kg/m²     General: No acute distress. Obese. Eyes: Sclera anicteric. ENT: No oral lesions. Nodes: No adenopathy. Skin: No spider angiomata. No jaundice. No palmar erythema. Respiratory: Lungs clear to auscultation. Cardiovascular: Regular heart rate. No murmurs. No JVD. Abdomen: Soft non-tender. Liver size normal to percussion/palpation. Spleen not palpable. No obvious ascites. Extremities: No edema. No muscle wasting. No gross arthritic changes. Neurologic: Alert and oriented. Cranial nerves grossly intact. No asterixis.     LABORATORY STUDIES:  Liver [place] of [address] Units 11/1/2019 3/23/2019   WBC 3.4 - 10.8 x10E3/uL 5.7 8.2   ANC 1.4 - 7.0 x10E3/uL 2.7 6.2   HGB 11.1 - 15.9 g/dL 12.5 12.8   HGB 11.5 - 16.0 GM/DL     HGB (iSTAT) 11.5 - 16.0 GM/DL      - 450 x10E3/uL 249 201   INR 0.8 - 1.2 1.1    AST 0 - 40 IU/L 21 29   ALT 0 - 32 IU/L 21 27   Alk Phos 39 - 117 IU/L 63 70   Bili, Total 0.0 - 1.2 mg/dL 0.4 0.5   Bili, Direct 0.00 - 0.40 mg/dL 0.15    Albumin 3.5 - 5.5 g/dL 4.4 3.9   BUN 6 - 24 mg/dL 16 18   BUN (iSTAT) 9 - 20 MG/DL     Creat 0.57 - 1.00 mg/dL 0.83 0.93   Creat (iSTAT) 0.6 - 1.3 MG/DL     Na 134 - 144 mmol/L 145 (H) 143   K 3.5 - 5.2 mmol/L 4.5 4.1   Cl 96 - 106 mmol/L 107 (H) 105   CO2 20 - 29 mmol/L 25 26   Glucose 65 - 99 mg/dL 94 98   Magnesium 1.6 - 2.4 mg/dL       Liver [place] [address] Ref Rng & Units 2/11/2019   WBC 3.4 - 10.8 x10E3/uL 5.5   ANC 1.4 - 7.0 x10E3/uL 2.2   HGB 11.1 - 15.9 g/dL 12.6   HGB 11.5 - 16.0 GM/DL    HGB (iSTAT) 11.5 - 16.0 GM/DL     - 450 x10E3/uL 205   INR 0.8 - 1.2    AST 0 - 40 IU/L 26   ALT 0 - 32 IU/L 35   Alk Phos 39 - 117 IU/L 83   Bili, Total 0.0 - 1.2 mg/dL 0.4   Bili, Direct 0.00 - 0.40 mg/dL    Albumin 3.5 - 5.5 g/dL 3.5   BUN 6 - 24 mg/dL 15   BUN (iSTAT) 9 - 20 MG/DL    Creat 0.57 - 1.00 mg/dL 0.96   Creat (iSTAT) 0.6 - 1.3 MG/DL    Na 134 - 144 mmol/L 144   K 3.5 - 5.2 mmol/L 3.3 (L)   Cl 96 - 106 mmol/L 106   CO2 20 - 29 mmol/L 28   Glucose 65 - 99 mg/dL 157 (H)   Magnesium 1.6 - 2.4 mg/dL 1.9     SEROLOGIES:  Serologies Latest Ref Rng & Units 11/1/2019   Hep B Surface Ag Negative Negative   Hep C Ab 0.0 - 0.9 s/co ratio <0.1   Alpha-1 antitrypsin level 101 - 187 mg/dL 135     LIVER HISTOLOGY:  12/2019. [facility]. EkPa was 5.4. IQR/med 9%. . The results suggested a fibrosis level of F0. The CAP score suggests no evidence of fatty liver. ENDOSCOPIC PROCEDURES:  Not available or performed    RADIOLOGY:  11/2019. RUQ ultrasound. Normal appearing liver. OTHER TESTING:  Not available or performed    FOLLOW-UP:  All of the issues listed above in the assessment and plan were discussed with the patient. All questions were answered. The patient expressed a clear understanding of the above. Follow-up Jeff Pink 32 in 1 year for FibroScan.  If her liver enzymes are elevated today, I may amend this follow up schedule.      Cate Ambriz [facility]  Liver [place] [facility] [address] [facility], [address] [address]  22.  [phone]

## 2019-12-06 LAB
ALBUMIN SERPL-MCNC: 4.3 G/DL (ref 3.5–5.5)
ALP SERPL-CCNC: 79 IU/L (ref 39–117)
ALT SERPL-CCNC: 38 IU/L (ref 0–32)
ANA SER QL: NEGATIVE
AST SERPL-CCNC: 31 IU/L (ref 0–40)
BILIRUB DIRECT SERPL-MCNC: 0.16 MG/DL (ref 0–0.4)
BILIRUB SERPL-MCNC: 0.3 MG/DL (ref 0–1.2)
BUN SERPL-MCNC: 12 MG/DL (ref 6–24)
BUN/CREAT SERPL: 17 (ref 9–23)
CALCIUM SERPL-MCNC: 9.6 MG/DL (ref 8.7–10.2)
CHLORIDE SERPL-SCNC: 105 MMOL/L (ref 96–106)
CO2 SERPL-SCNC: 24 MMOL/L (ref 20–29)
CREAT SERPL-MCNC: 0.7 MG/DL (ref 0.57–1)
ERYTHROCYTE [DISTWIDTH] IN BLOOD BY AUTOMATED COUNT: 12.8 % (ref 12.3–15.4)
FERRITIN SERPL-MCNC: 70 NG/ML (ref 15–150)
GLUCOSE SERPL-MCNC: 80 MG/DL (ref 65–99)
HCT VFR BLD AUTO: 37.3 % (ref 34–46.6)
HGB BLD-MCNC: 12.4 G/DL (ref 11.1–15.9)
IRON SATN MFR SERPL: 20 % (ref 15–55)
IRON SERPL-MCNC: 75 UG/DL (ref 27–159)
MCH RBC QN AUTO: 31.2 PG (ref 26.6–33)
MCHC RBC AUTO-ENTMCNC: 33.2 G/DL (ref 31.5–35.7)
MCV RBC AUTO: 94 FL (ref 79–97)
PLATELET # BLD AUTO: 237 X10E3/UL (ref 150–450)
POTASSIUM SERPL-SCNC: 4.2 MMOL/L (ref 3.5–5.2)
PROT SERPL-MCNC: 6.4 G/DL (ref 6–8.5)
RBC # BLD AUTO: 3.98 X10E6/UL (ref 3.77–5.28)
SODIUM SERPL-SCNC: 144 MMOL/L (ref 134–144)
TIBC SERPL-MCNC: 372 UG/DL (ref 250–450)
UIBC SERPL-MCNC: 297 UG/DL (ref 131–425)
WBC # BLD AUTO: 5.8 X10E3/UL (ref 3.4–10.8)

## 2019-12-12 ENCOUNTER — APPOINTMENT (OUTPATIENT)
Dept: GENERAL RADIOLOGY | Age: 54
End: 2019-12-12
Attending: EMERGENCY MEDICINE
Payer: MEDICAID

## 2019-12-12 ENCOUNTER — HOSPITAL ENCOUNTER (EMERGENCY)
Age: 54
Discharge: HOME | End: 2019-12-12
Attending: EMERGENCY MEDICINE
Payer: MEDICAID

## 2019-12-12 VITALS
RESPIRATION RATE: 18 BRPM | WEIGHT: 170 LBS | TEMPERATURE: 97.8 F | SYSTOLIC BLOOD PRESSURE: 140 MMHG | BODY MASS INDEX: 30.12 KG/M2 | HEART RATE: 64 BPM | HEIGHT: 63 IN | DIASTOLIC BLOOD PRESSURE: 78 MMHG

## 2019-12-12 DIAGNOSIS — S20.219A CONTUSION OF CHEST WALL, UNSPECIFIED LATERALITY, INITIAL ENCOUNTER: Primary | ICD-10-CM
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
## 2019-12-12 LAB
ATRIAL RATE: 61 BPM
CALCULATED P AXIS, ECG09: 15 DEGREES
CALCULATED R AXIS, ECG10: 0 DEGREES
CALCULATED T AXIS, ECG11: 35 DEGREES
DIAGNOSIS, 93000: NORMAL
P-R INTERVAL, ECG05: 218 MS
Q-T INTERVAL, ECG07: 416 MS
QRS DURATION, ECG06: 84 MS
QTC CALCULATION (BEZET), ECG08: 418 MS
TROPONIN I BLD-MCNC: <0.04 NG/ML (ref 0–0.08)
VENTRICULAR RATE, ECG03: 61 BPM

## 2019-12-12 PROCEDURE — 99284 EMERGENCY DEPT VISIT MOD MDM: CPT

## 2019-12-12 PROCEDURE — 93005 ELECTROCARDIOGRAM TRACING: CPT

## 2019-12-12 PROCEDURE — 71046 X-RAY EXAM CHEST 2 VIEWS: CPT

## 2019-12-12 PROCEDURE — 84484 ASSAY OF TROPONIN QUANT: CPT

## 2019-12-12 PROCEDURE — 74011000250 HC RX REV CODE- 250: Performed by: EMERGENCY MEDICINE

## 2019-12-12 RX ORDER — LIDOCAINE 4 G/100G
2 PATCH TOPICAL EVERY 24 HOURS
Status: DISCONTINUED | OUTPATIENT
Start: 2019-12-12 | End: 2019-12-12 | Stop reason: HOSPADM

## 2019-12-12 RX ORDER — LIDOCAINE 50 MG/G
PATCH TOPICAL
Qty: 5 EACH | Refills: 0 | Status: ON HOLD | OUTPATIENT
Start: 2019-12-12 | End: 2020-09-16
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
## 2019-12-12 NOTE — ED NOTES
Dr Adalberto Marquez reviewed discharge instructions with the patient. The patient verbalized understanding. All questions and concerns were addressed. The patient declined a wheelchair and is discharged ambulatory in the care of family members with instructions and prescriptions in hand. Pt is alert and oriented x 4. Respirations are clear and unlabored.
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
## 2019-12-18 LAB
LAB DIRECTOR NAME PROVIDER: NORMAL
SERPINA1 GENE MUT ANL BLD/T: NORMAL
SERPINA1 GENE MUT TESTED BLD/T: NORMAL

## 2020-05-06 ENCOUNTER — DOCUMENTATION ONLY (OUTPATIENT)
Dept: HEMATOLOGY | Age: 55
End: 2020-05-06

## 2020-06-23 ENCOUNTER — HOSPITAL ENCOUNTER (EMERGENCY)
Age: 55
Discharge: HOME | End: 2020-06-23
Attending: EMERGENCY MEDICINE | Admitting: EMERGENCY MEDICINE
Payer: COMMERCIAL

## 2020-06-23 ENCOUNTER — APPOINTMENT (OUTPATIENT)
Dept: CT IMAGING | Age: 55
End: 2020-06-23
Attending: EMERGENCY MEDICINE
Payer: COMMERCIAL

## 2020-06-23 VITALS
HEART RATE: 74 BPM | RESPIRATION RATE: 22 BRPM | BODY MASS INDEX: 31.84 KG/M2 | DIASTOLIC BLOOD PRESSURE: 59 MMHG | TEMPERATURE: 98.3 F | HEIGHT: 63 IN | OXYGEN SATURATION: 98 % | WEIGHT: 179.68 LBS | SYSTOLIC BLOOD PRESSURE: 127 MMHG

## 2020-06-23 DIAGNOSIS — R30.0 DYSURIA: ICD-10-CM

## 2020-06-23 DIAGNOSIS — R42 DIZZINESS: Primary | ICD-10-CM

## 2020-06-23 LAB
ALBUMIN SERPL-MCNC: 4 G/DL (ref 3.5–5)
ALBUMIN/GLOB SERPL: 1.1 {RATIO} (ref 1.1–2.2)
ALP SERPL-CCNC: 78 U/L (ref 45–117)
ALT SERPL-CCNC: 40 U/L (ref 12–78)
ANION GAP SERPL CALC-SCNC: 6 MMOL/L (ref 5–15)
APPEARANCE UR: CLEAR
AST SERPL-CCNC: 23 U/L (ref 15–37)
BACTERIA URNS QL MICRO: NEGATIVE /HPF
BASOPHILS # BLD: 0 K/UL (ref 0–0.1)
BASOPHILS NFR BLD: 1 % (ref 0–1)
BILIRUB SERPL-MCNC: 0.4 MG/DL (ref 0.2–1)
BILIRUB UR QL: NEGATIVE
BUN SERPL-MCNC: 16 MG/DL (ref 6–20)
BUN/CREAT SERPL: 14 (ref 12–20)
CALCIUM SERPL-MCNC: 9.3 MG/DL (ref 8.5–10.1)
CHLORIDE SERPL-SCNC: 106 MMOL/L (ref 97–108)
CO2 SERPL-SCNC: 32 MMOL/L (ref 21–32)
COLOR UR: ABNORMAL
COMMENT, HOLDF: NORMAL
CREAT SERPL-MCNC: 1.17 MG/DL (ref 0.55–1.02)
DIFFERENTIAL METHOD BLD: NORMAL
EOSINOPHIL # BLD: 0.2 K/UL (ref 0–0.4)
EOSINOPHIL NFR BLD: 3 % (ref 0–7)
EPITH CASTS URNS QL MICRO: ABNORMAL /LPF
ERYTHROCYTE [DISTWIDTH] IN BLOOD BY AUTOMATED COUNT: 13.1 % (ref 11.5–14.5)
GLOBULIN SER CALC-MCNC: 3.6 G/DL (ref 2–4)
GLUCOSE BLD STRIP.AUTO-MCNC: 110 MG/DL (ref 65–100)
GLUCOSE SERPL-MCNC: 91 MG/DL (ref 65–100)
GLUCOSE UR STRIP.AUTO-MCNC: NEGATIVE MG/DL
HCT VFR BLD AUTO: 38.2 % (ref 35–47)
HGB BLD-MCNC: 12.7 G/DL (ref 11.5–16)
HGB UR QL STRIP: ABNORMAL
IMM GRANULOCYTES # BLD AUTO: 0 K/UL (ref 0–0.04)
IMM GRANULOCYTES NFR BLD AUTO: 0 % (ref 0–0.5)
KETONES UR QL STRIP.AUTO: NEGATIVE MG/DL
LEUKOCYTE ESTERASE UR QL STRIP.AUTO: NEGATIVE
LIPASE SERPL-CCNC: 152 U/L (ref 73–393)
LYMPHOCYTES # BLD: 2.8 K/UL (ref 0.8–3.5)
LYMPHOCYTES NFR BLD: 44 % (ref 12–49)
MCH RBC QN AUTO: 30.8 PG (ref 26–34)
MCHC RBC AUTO-ENTMCNC: 33.2 G/DL (ref 30–36.5)
MCV RBC AUTO: 92.7 FL (ref 80–99)
MONOCYTES # BLD: 0.7 K/UL (ref 0–1)
MONOCYTES NFR BLD: 11 % (ref 5–13)
NEUTS SEG # BLD: 2.5 K/UL (ref 1.8–8)
NEUTS SEG NFR BLD: 41 % (ref 32–75)
NITRITE UR QL STRIP.AUTO: NEGATIVE
NRBC # BLD: 0 K/UL (ref 0–0.01)
NRBC BLD-RTO: 0 PER 100 WBC
PH UR STRIP: 7.5 [PH] (ref 5–8)
PLATELET # BLD AUTO: 260 K/UL (ref 150–400)
PMV BLD AUTO: 10.4 FL (ref 8.9–12.9)
POTASSIUM SERPL-SCNC: 3.8 MMOL/L (ref 3.5–5.1)
PROT SERPL-MCNC: 7.6 G/DL (ref 6.4–8.2)
PROT UR STRIP-MCNC: NEGATIVE MG/DL
RBC # BLD AUTO: 4.12 M/UL (ref 3.8–5.2)
RBC #/AREA URNS HPF: ABNORMAL /HPF (ref 0–5)
SAMPLES BEING HELD,HOLD: NORMAL
SERVICE CMNT-IMP: ABNORMAL
SODIUM SERPL-SCNC: 144 MMOL/L (ref 136–145)
SP GR UR REFRACTOMETRY: 1.01 (ref 1–1.03)
TROPONIN I SERPL-MCNC: <0.05 NG/ML
UR CULT HOLD, URHOLD: NORMAL
UROBILINOGEN UR QL STRIP.AUTO: 0.2 EU/DL (ref 0.2–1)
WBC # BLD AUTO: 6.2 K/UL (ref 3.6–11)
WBC URNS QL MICRO: ABNORMAL /HPF (ref 0–4)

## 2020-06-23 PROCEDURE — 96361 HYDRATE IV INFUSION ADD-ON: CPT

## 2020-06-23 PROCEDURE — 74011250636 HC RX REV CODE- 250/636: Performed by: EMERGENCY MEDICINE

## 2020-06-23 PROCEDURE — 81001 URINALYSIS AUTO W/SCOPE: CPT

## 2020-06-23 PROCEDURE — 99285 EMERGENCY DEPT VISIT HI MDM: CPT

## 2020-06-23 PROCEDURE — 93005 ELECTROCARDIOGRAM TRACING: CPT

## 2020-06-23 PROCEDURE — 70450 CT HEAD/BRAIN W/O DYE: CPT

## 2020-06-23 PROCEDURE — 82962 GLUCOSE BLOOD TEST: CPT

## 2020-06-23 PROCEDURE — 80053 COMPREHEN METABOLIC PANEL: CPT

## 2020-06-23 PROCEDURE — 96374 THER/PROPH/DIAG INJ IV PUSH: CPT

## 2020-06-23 PROCEDURE — 83690 ASSAY OF LIPASE: CPT

## 2020-06-23 PROCEDURE — 84484 ASSAY OF TROPONIN QUANT: CPT

## 2020-06-23 PROCEDURE — 36415 COLL VENOUS BLD VENIPUNCTURE: CPT

## 2020-06-23 PROCEDURE — 85025 COMPLETE CBC W/AUTO DIFF WBC: CPT

## 2020-06-23 RX ORDER — DIAZEPAM 10 MG/2ML
5 INJECTION INTRAMUSCULAR ONCE
Status: COMPLETED | OUTPATIENT
Start: 2020-06-23 | End: 2020-06-23

## 2020-06-23 RX ORDER — MECLIZINE HYDROCHLORIDE 25 MG/1
25 TABLET ORAL
Qty: 10 TAB | Refills: 0 | Status: SHIPPED | OUTPATIENT
Start: 2020-06-23 | End: 2020-07-03

## 2020-06-23 RX ADMIN — SODIUM CHLORIDE 1000 ML: 900 INJECTION, SOLUTION INTRAVENOUS at 21:43

## 2020-06-23 RX ADMIN — DIAZEPAM 5 MG: 5 INJECTION, SOLUTION INTRAMUSCULAR; INTRAVENOUS at 21:46

## 2020-06-24 LAB
ATRIAL RATE: 85 BPM
CALCULATED P AXIS, ECG09: 56 DEGREES
CALCULATED R AXIS, ECG10: 3 DEGREES
CALCULATED T AXIS, ECG11: 59 DEGREES
DIAGNOSIS, 93000: NORMAL
P-R INTERVAL, ECG05: 200 MS
Q-T INTERVAL, ECG07: 362 MS
QRS DURATION, ECG06: 78 MS
QTC CALCULATION (BEZET), ECG08: 430 MS
VENTRICULAR RATE, ECG03: 85 BPM

## 2020-06-24 NOTE — ED TRIAGE NOTES
Pt c/o angina throughout the afternoon and severe dizziness that started 1 hour ago. Pt also c/o \"kidney pain\" x 3 days.

## 2020-06-24 NOTE — ED PROVIDER NOTES
57-year-old female presenting to ER with complaint of dysuria and dizziness. Patient has significant history of CAD, hypertension, and report of nonsustained V. tach. Patient presenting to the ER complaining of dysuria for the last couple days with increased frequency with no burning with urination. Eating and drinking normally. Patient also reporting dizziness described as room spinning. No ringing in ears or hearing loss. No numbness no weakness. Patient endorses difficulty ambulating due to feeling off balance. Head injuries. No blurry vision. No chest pain or shortness of breath or palpitations. No nausea or vomiting. No diarrhea constipation. No rashes. Of note patient is well-known to ER staff of having multiple complaints and attention seeking. Abdominal Pain    Associated symptoms include frequency. Pertinent negatives include no fever, no diarrhea, no nausea, no vomiting, no dysuria, no headaches, no chest pain and no back pain. Dizziness   Pertinent negatives include no shortness of breath, no chest pain, no vomiting, no headaches and no nausea. Chest Pain (Angina)    Associated symptoms include dizziness. Pertinent negatives include no abdominal pain, no back pain, no fever, no headaches, no nausea, no numbness, no palpitations, no shortness of breath, no vomiting and no weakness.         Past Medical History:   Diagnosis Date    CAD (coronary artery disease)     Gastrointestinal disorder     hiatal hernia    Hypertension     Ill-defined condition     vertigo    MI (myocardial infarction) ([facility] [address]        Past Surgical History:   Procedure Laterality Date    CARDIAC SURG PROCEDURE UNLIST  12    stents x 3    CARDIAC SURG PROCEDURE UNLIST  10/2013    stents x 3    HX  SECTION      three    HX TUBAL LIGATION           Family History:   Problem Relation Age of Onset    Elevated Lipids Father     Heart Disease Father 46        heart attack    Elevated Lipids Brother  Elevated Lipids Paternal Grandfather     Heart Disease Paternal Grandfather     Hypertension Paternal Grandfather     Stroke Paternal Grandfather        Social History     Socioeconomic History    Marital status: LEGALLY      Spouse name: Not on file    Number of children: Not on file    Years of education: Not on file    Highest education level: Not on file   Occupational History    Not on file   Social Needs    Financial resource strain: Not on file    Food insecurity     Worry: Not on file     Inability: Not on file   [employer] needs     Medical: Not on file     Non-medical: Not on file   Tobacco Use    Smoking status: Former Smoker     Packs/day: 2.00     Years: 30.00     Pack years: 60.00     Types: Cigarettes     Last attempt to quit: 2012     Years since quittin.0    Smokeless tobacco: Never Used   Substance and Sexual Activity    Alcohol use: No     Alcohol/week: 0.0 standard drinks    Drug use: No    Sexual activity: Yes     Partners: Male   Lifestyle    Physical activity     Days per week: Not on file     Minutes per session: Not on file    Stress: Not on file   Relationships    Social connections     Talks on phone: Not on file     Gets together: Not on file     Attends Sikhism service: Not on file     Active member of club or organization: Not on file     Attends meetings of clubs or organizations: Not on file     Relationship status: Not on file    Intimate partner violence     Fear of current or ex partner: Not on file     Emotionally abused: Not on file     Physically abused: Not on file     Forced sexual activity: Not on file   Other Topics Concern    Not on file   Social History Narrative    Not on file         ALLERGIES: Erythromycin; Demerol [meperidine]; and Sulfa (sulfonamide antibiotics)    Review of Systems   Constitutional: Negative for chills and fever. HENT: Negative for congestion and sore throat. Eyes: Negative for pain. Respiratory: Negative for shortness of breath. Cardiovascular: Negative for chest pain, palpitations and leg swelling. Gastrointestinal: Negative for abdominal pain, diarrhea, nausea and vomiting. Genitourinary: Positive for frequency. Negative for dysuria and flank pain. Musculoskeletal: Negative for back pain and neck pain. Skin: Negative for rash. Neurological: Positive for dizziness. Negative for weakness, numbness and headaches. Vitals:    06/23/20 2132 06/23/20 2216 06/23/20 2220 06/23/20 2224   BP: 127/59      Pulse: 75 69 74    Resp: 17 22 22    Temp:    98.3 °F (36.8 °C)   SpO2: 100% 99% 98%    Weight:       Height:                Physical Exam  Constitutional:       Appearance: She is well-developed. HENT:      Head: Normocephalic. Right Ear: Hearing, tympanic membrane, ear canal and external ear normal.      Left Ear: Hearing, tympanic membrane, ear canal and external ear normal.      Nose: Nose normal.      Mouth/Throat:      Pharynx: Oropharynx is clear. Eyes:      Extraocular Movements: Extraocular movements intact. Right eye: Normal extraocular motion and no nystagmus. Left eye: Normal extraocular motion and no nystagmus. Conjunctiva/sclera: Conjunctivae normal.      Pupils: Pupils are equal, round, and reactive to light. Neck:      Musculoskeletal: Normal range of motion and neck supple. Cardiovascular:      Rate and Rhythm: Normal rate and regular rhythm. Pulmonary:      Effort: Pulmonary effort is normal. No respiratory distress. Breath sounds: Normal breath sounds. Abdominal:      General: Bowel sounds are normal.      Palpations: Abdomen is soft. Tenderness: There is no abdominal tenderness. Musculoskeletal: Normal range of motion. Skin:     General: Skin is warm. Capillary Refill: Capillary refill takes less than 2 seconds. Findings: No rash.    Neurological:      Mental Status: She is alert and oriented to person, place, and time. Cranial Nerves: Cranial nerves are intact. Sensory: Sensation is intact. Motor: Motor function is intact. Coordination: Coordination is intact. Romberg sign negative. Coordination normal. Finger-Nose-Finger Test and Heel to [facility] Test normal.      Gait: Gait is intact. Comments: No gross motor or sensory deficits          MDM  Number of Diagnoses or Management Options  Dizziness:   Dysuria:   Diagnosis management comments: Patient presented to ER with initial complaint of dysuria. There is no report of dizziness to nursing staff or during triage. During my evaluation patient added dizziness. Stated that dizziness was constant like the room spinning. Feeling off balance. However patient having no signs of nystagmus normal coordination. And ambulating well. Negative orthostatics. TMs unremarkable  Patient's labs unremarkable EKG normal sinus rhythm. No signs of ischemia. Troponin negative. Patient is on  aspirin and Plavix. Patient symptoms resolved in the ER. Reports feeling much better after receiving IV fluids. Patient stable for discharge follow-up with family doctor outpatient. Amount and/or Complexity of Data Reviewed  Clinical lab tests: reviewed  Tests in the radiology section of CPT®: reviewed  Tests in the medicine section of CPT®: reviewed      ED Course as of Jun 24 0016   Tue Jun 23, 2020 2126 2012: 3 stents  Significant HLD      [ZD]      ED Course User Index  [ZD] Rashaun Melissa MD     12:25 AM  EKG: NSR. Rate 85. Normal intervals, normal axis, no ST-elevations or depressions. No signs of ischemia.   Interpreted by Nataly Muñiz MD      Procedures        Recent Results (from the past 24 hour(s))   EKG, 12 LEAD, INITIAL    Collection Time: 06/23/20  8:53 PM   Result Value Ref Range    Ventricular Rate 85 BPM    Atrial Rate 85 BPM    P-R Interval 200 ms    QRS Duration 78 ms    Q-T Interval 362 ms    QTC Calculation (Bezet) 430 ms Calculated P Axis 56 degrees    Calculated R Axis 3 degrees    Calculated T Axis 59 degrees    Diagnosis       Normal sinus rhythm  Low voltage QRS  Borderline ECG  When compared with ECG of 12-DEC-2019 12:10,  No significant change was found     URINALYSIS W/MICROSCOPIC    Collection Time: 06/23/20  9:05 PM   Result Value Ref Range    Color YELLOW/STRAW      Appearance CLEAR CLEAR      Specific gravity 1.007 1.003 - 1.030      pH (UA) 7.5 5.0 - 8.0      Protein Negative NEG mg/dL    Glucose Negative NEG mg/dL    Ketone Negative NEG mg/dL    Bilirubin Negative NEG      Blood TRACE (A) NEG      Urobilinogen 0.2 0.2 - 1.0 EU/dL    Nitrites Negative NEG      Leukocyte Esterase Negative NEG      WBC 0-4 0 - 4 /hpf    RBC 0-5 0 - 5 /hpf    Epithelial cells FEW FEW /lpf    Bacteria Negative NEG /hpf   URINE CULTURE HOLD SAMPLE    Collection Time: 06/23/20  9:05 PM   Result Value Ref Range    Urine culture hold        Urine on hold in Microbiology dept for 2 days. If unpreserved urine is submitted, it cannot be used for addtional testing after 24 hours, recollection will be required. SAMPLES BEING HELD    Collection Time: 06/23/20  9:05 PM   Result Value Ref Range    SAMPLES BEING HELD 1BLUE, 1RED     COMMENT        Add-on orders for these samples will be processed based on acceptable specimen integrity and analyte stability, which may vary by analyte.    CBC WITH AUTOMATED DIFF    Collection Time: 06/23/20  9:05 PM   Result Value Ref Range    WBC 6.2 3.6 - 11.0 K/uL    RBC 4.12 3.80 - 5.20 M/uL    HGB 12.7 11.5 - 16.0 g/dL    HCT 38.2 35.0 - 47.0 %    MCV 92.7 80.0 - 99.0 FL    MCH 30.8 26.0 - 34.0 PG    MCHC 33.2 30.0 - 36.5 g/dL    RDW 13.1 11.5 - 14.5 %    PLATELET 065 045 - 248 K/uL    MPV 10.4 8.9 - 12.9 FL    NRBC 0.0 0  WBC    ABSOLUTE NRBC 0.00 0.00 - 0.01 K/uL    NEUTROPHILS 41 32 - 75 %    LYMPHOCYTES 44 12 - 49 %    MONOCYTES 11 5 - 13 %    EOSINOPHILS 3 0 - 7 %    BASOPHILS 1 0 - 1 %    IMMATURE GRANULOCYTES 0 0.0 - 0.5 %    ABS. NEUTROPHILS 2.5 1.8 - 8.0 K/UL    ABS. LYMPHOCYTES 2.8 0.8 - 3.5 K/UL    ABS. MONOCYTES 0.7 0.0 - 1.0 K/UL    ABS. EOSINOPHILS 0.2 0.0 - 0.4 K/UL    ABS. BASOPHILS 0.0 0.0 - 0.1 K/UL    ABS. IMM. GRANS. 0.0 0.00 - 0.04 K/UL    DF AUTOMATED     METABOLIC PANEL, COMPREHENSIVE    Collection Time: 06/23/20  9:05 PM   Result Value Ref Range    Sodium 144 136 - 145 mmol/L    Potassium 3.8 3.5 - 5.1 mmol/L    Chloride 106 97 - 108 mmol/L    CO2 32 21 - 32 mmol/L    Anion gap 6 5 - 15 mmol/L    Glucose 91 65 - 100 mg/dL    BUN 16 6 - 20 MG/DL    Creatinine 1.17 (H) 0.55 - 1.02 MG/DL    BUN/Creatinine ratio 14 12 - 20      GFR est AA 59 (L) >60 ml/min/1.73m2    GFR est non-AA 48 (L) >60 ml/min/1.73m2    Calcium 9.3 8.5 - 10.1 MG/DL    Bilirubin, total 0.4 0.2 - 1.0 MG/DL    ALT (SGPT) 40 12 - 78 U/L    AST (SGOT) 23 15 - 37 U/L    Alk. phosphatase 78 45 - 117 U/L    Protein, total 7.6 6.4 - 8.2 g/dL    Albumin 4.0 3.5 - 5.0 g/dL    Globulin 3.6 2.0 - 4.0 g/dL    A-G Ratio 1.1 1.1 - 2.2     LIPASE    Collection Time: 06/23/20  9:05 PM   Result Value Ref Range    Lipase 152 73 - 393 U/L   TROPONIN I    Collection Time: 06/23/20  9:05 PM   Result Value Ref Range    Troponin-I, Qt. <0.05 <0.05 ng/mL   GLUCOSE, POC    Collection Time: 06/23/20  9:37 PM   Result Value Ref Range    Glucose (POC) 110 (H) 65 - 100 mg/dL    Performed by Jareth Reyes        Ct Head Wo Cont    Result Date: 6/23/2020  EXAM: CT HEAD WO CONT INDICATION: Dizziness started one hour prior to arrival. COMPARISON: CT head on 7/13/2015. Crystal Ramona CONTRAST: None. TECHNIQUE: Unenhanced CT of the head was performed using 5 mm images. Brain and bone windows were generated. Coronal and sagittal reformats. CT dose reduction was achieved through use of a standardized protocol tailored for this examination and automatic exposure control for dose modulation. FINDINGS: The ventricles and sulci are normal in size, shape and configuration. Crystal Ramona  No hypoattenuation in the white matter. Calcification in the posterior left frontal periventricular white matter is unchanged. . There is no intracranial hemorrhage, extra-axial collection, or mass effect. The basilar cisterns are open. No CT evidence of acute infarct. The bone windows demonstrate no abnormalities. The visualized portions of the paranasal sinuses and mastoid air cells are clear. IMPRESSION: No acute process on noncontrast CT. No change.

## 2020-09-15 ENCOUNTER — HOSPITAL ENCOUNTER (OUTPATIENT)
Age: 55
Setting detail: OBSERVATION
Discharge: HOME | End: 2020-09-16
Attending: EMERGENCY MEDICINE | Admitting: FAMILY MEDICINE
Payer: COMMERCIAL

## 2020-09-15 ENCOUNTER — APPOINTMENT (OUTPATIENT)
Dept: GENERAL RADIOLOGY | Age: 55
End: 2020-09-15
Attending: EMERGENCY MEDICINE
Payer: COMMERCIAL

## 2020-09-15 DIAGNOSIS — E78.01 FAMILIAL HYPERCHOLESTEROLEMIA: ICD-10-CM

## 2020-09-15 DIAGNOSIS — I10 ESSENTIAL HYPERTENSION: ICD-10-CM

## 2020-09-15 DIAGNOSIS — I47.20 VENTRICULAR TACHYCARDIA: ICD-10-CM

## 2020-09-15 DIAGNOSIS — I25.10 CORONARY ARTERY DISEASE INVOLVING NATIVE CORONARY ARTERY OF NATIVE HEART WITHOUT ANGINA PECTORIS: ICD-10-CM

## 2020-09-15 DIAGNOSIS — R07.9 ACUTE CHEST PAIN: Primary | ICD-10-CM

## 2020-09-15 LAB
ALBUMIN SERPL-MCNC: 3.6 G/DL (ref 3.5–5)
ALBUMIN/GLOB SERPL: 1.1 {RATIO} (ref 1.1–2.2)
ALP SERPL-CCNC: 74 U/L (ref 45–117)
ALT SERPL-CCNC: 37 U/L (ref 12–78)
ANION GAP SERPL CALC-SCNC: 7 MMOL/L (ref 5–15)
APPEARANCE UR: CLEAR
AST SERPL-CCNC: 30 U/L (ref 15–37)
BACTERIA URNS QL MICRO: NEGATIVE /HPF
BASOPHILS # BLD: 0.1 K/UL (ref 0–0.1)
BASOPHILS NFR BLD: 1 % (ref 0–1)
BILIRUB SERPL-MCNC: 0.4 MG/DL (ref 0.2–1)
BILIRUB UR QL: NEGATIVE
BUN SERPL-MCNC: 15 MG/DL (ref 6–20)
BUN/CREAT SERPL: 13 (ref 12–20)
CALCIUM SERPL-MCNC: 9.3 MG/DL (ref 8.5–10.1)
CHLORIDE SERPL-SCNC: 105 MMOL/L (ref 97–108)
CO2 SERPL-SCNC: 30 MMOL/L (ref 21–32)
COLOR UR: ABNORMAL
CREAT SERPL-MCNC: 1.16 MG/DL (ref 0.55–1.02)
DIFFERENTIAL METHOD BLD: NORMAL
EOSINOPHIL # BLD: 0.2 K/UL (ref 0–0.4)
EOSINOPHIL NFR BLD: 4 % (ref 0–7)
EPITH CASTS URNS QL MICRO: NORMAL /LPF
ERYTHROCYTE [DISTWIDTH] IN BLOOD BY AUTOMATED COUNT: 13 % (ref 11.5–14.5)
GLOBULIN SER CALC-MCNC: 3.4 G/DL (ref 2–4)
GLUCOSE SERPL-MCNC: 100 MG/DL (ref 65–100)
GLUCOSE UR STRIP.AUTO-MCNC: NEGATIVE MG/DL
HCT VFR BLD AUTO: 40.4 % (ref 35–47)
HGB BLD-MCNC: 13.2 G/DL (ref 11.5–16)
HGB UR QL STRIP: ABNORMAL
IMM GRANULOCYTES # BLD AUTO: 0 K/UL (ref 0–0.04)
IMM GRANULOCYTES NFR BLD AUTO: 0 % (ref 0–0.5)
KETONES UR QL STRIP.AUTO: NEGATIVE MG/DL
LEUKOCYTE ESTERASE UR QL STRIP.AUTO: ABNORMAL
LYMPHOCYTES # BLD: 2.9 K/UL (ref 0.8–3.5)
LYMPHOCYTES NFR BLD: 46 % (ref 12–49)
MCH RBC QN AUTO: 30.6 PG (ref 26–34)
MCHC RBC AUTO-ENTMCNC: 32.7 G/DL (ref 30–36.5)
MCV RBC AUTO: 93.7 FL (ref 80–99)
MONOCYTES # BLD: 0.7 K/UL (ref 0–1)
MONOCYTES NFR BLD: 11 % (ref 5–13)
NEUTS SEG # BLD: 2.4 K/UL (ref 1.8–8)
NEUTS SEG NFR BLD: 38 % (ref 32–75)
NITRITE UR QL STRIP.AUTO: NEGATIVE
NRBC # BLD: 0 K/UL (ref 0–0.01)
NRBC BLD-RTO: 0 PER 100 WBC
PH UR STRIP: 7 [PH] (ref 5–8)
PLATELET # BLD AUTO: 241 K/UL (ref 150–400)
PMV BLD AUTO: 10.6 FL (ref 8.9–12.9)
POTASSIUM SERPL-SCNC: 4 MMOL/L (ref 3.5–5.1)
PROT SERPL-MCNC: 7 G/DL (ref 6.4–8.2)
PROT UR STRIP-MCNC: NEGATIVE MG/DL
RBC # BLD AUTO: 4.31 M/UL (ref 3.8–5.2)
RBC #/AREA URNS HPF: NORMAL /HPF (ref 0–5)
SODIUM SERPL-SCNC: 142 MMOL/L (ref 136–145)
SP GR UR REFRACTOMETRY: 1.01 (ref 1–1.03)
TROPONIN I SERPL-MCNC: <0.05 NG/ML
UROBILINOGEN UR QL STRIP.AUTO: 0.2 EU/DL (ref 0.2–1)
WBC # BLD AUTO: 6.3 K/UL (ref 3.6–11)
WBC URNS QL MICRO: NORMAL /HPF (ref 0–4)

## 2020-09-15 PROCEDURE — 74011250637 HC RX REV CODE- 250/637: Performed by: EMERGENCY MEDICINE

## 2020-09-15 PROCEDURE — 74011000250 HC RX REV CODE- 250: Performed by: EMERGENCY MEDICINE

## 2020-09-15 PROCEDURE — 84484 ASSAY OF TROPONIN QUANT: CPT

## 2020-09-15 PROCEDURE — 99285 EMERGENCY DEPT VISIT HI MDM: CPT

## 2020-09-15 PROCEDURE — 81001 URINALYSIS AUTO W/SCOPE: CPT

## 2020-09-15 PROCEDURE — 71046 X-RAY EXAM CHEST 2 VIEWS: CPT

## 2020-09-15 PROCEDURE — 36415 COLL VENOUS BLD VENIPUNCTURE: CPT

## 2020-09-15 PROCEDURE — 80053 COMPREHEN METABOLIC PANEL: CPT

## 2020-09-15 PROCEDURE — 93005 ELECTROCARDIOGRAM TRACING: CPT

## 2020-09-15 PROCEDURE — 99218 HC RM OBSERVATION: CPT

## 2020-09-15 PROCEDURE — 85025 COMPLETE CBC W/AUTO DIFF WBC: CPT

## 2020-09-15 RX ORDER — GUAIFENESIN 100 MG/5ML
81 LIQUID (ML) ORAL
Status: COMPLETED | OUTPATIENT
Start: 2020-09-15 | End: 2020-09-15

## 2020-09-15 RX ADMIN — ASPIRIN 81 MG CHEWABLE TABLET 81 MG: 81 TABLET CHEWABLE at 20:41

## 2020-09-15 RX ADMIN — LIDOCAINE HYDROCHLORIDE 40 ML: 20 SOLUTION ORAL; TOPICAL at 22:02

## 2020-09-16 ENCOUNTER — APPOINTMENT (OUTPATIENT)
Dept: NON INVASIVE DIAGNOSTICS | Age: 55
End: 2020-09-16
Attending: NURSE PRACTITIONER
Payer: COMMERCIAL

## 2020-09-16 ENCOUNTER — APPOINTMENT (OUTPATIENT)
Dept: NUCLEAR MEDICINE | Age: 55
End: 2020-09-16
Attending: NURSE PRACTITIONER
Payer: COMMERCIAL

## 2020-09-16 VITALS
RESPIRATION RATE: 18 BRPM | SYSTOLIC BLOOD PRESSURE: 114 MMHG | WEIGHT: 196 LBS | TEMPERATURE: 97.9 F | BODY MASS INDEX: 34.73 KG/M2 | DIASTOLIC BLOOD PRESSURE: 68 MMHG | OXYGEN SATURATION: 98 % | HEART RATE: 67 BPM | HEIGHT: 63 IN

## 2020-09-16 LAB
ALBUMIN SERPL-MCNC: 3.6 G/DL (ref 3.5–5)
ALBUMIN/GLOB SERPL: 1.1 {RATIO} (ref 1.1–2.2)
ALP SERPL-CCNC: 79 U/L (ref 45–117)
ALT SERPL-CCNC: 45 U/L (ref 12–78)
ANION GAP SERPL CALC-SCNC: 8 MMOL/L (ref 5–15)
AST SERPL-CCNC: 36 U/L (ref 15–37)
ATRIAL RATE: 63 BPM
ATRIAL RATE: 82 BPM
BASOPHILS # BLD: 0.1 K/UL (ref 0–0.1)
BASOPHILS NFR BLD: 1 % (ref 0–1)
BILIRUB SERPL-MCNC: 0.4 MG/DL (ref 0.2–1)
BUN SERPL-MCNC: 14 MG/DL (ref 6–20)
BUN/CREAT SERPL: 16 (ref 12–20)
CALCIUM SERPL-MCNC: 9.4 MG/DL (ref 8.5–10.1)
CALCULATED P AXIS, ECG09: 16 DEGREES
CALCULATED P AXIS, ECG09: 44 DEGREES
CALCULATED R AXIS, ECG10: 0 DEGREES
CALCULATED R AXIS, ECG10: 20 DEGREES
CALCULATED T AXIS, ECG11: 58 DEGREES
CALCULATED T AXIS, ECG11: 64 DEGREES
CHLORIDE SERPL-SCNC: 109 MMOL/L (ref 97–108)
CHOLEST SERPL-MCNC: 102 MG/DL
CO2 SERPL-SCNC: 24 MMOL/L (ref 21–32)
CREAT SERPL-MCNC: 0.87 MG/DL (ref 0.55–1.02)
DIAGNOSIS, 93000: NORMAL
DIAGNOSIS, 93000: NORMAL
DIFFERENTIAL METHOD BLD: ABNORMAL
ECHO AO ROOT DIAM: 2.92 CM
ECHO AV AREA PEAK VELOCITY: 2.03 CM2
ECHO AV AREA/BSA PEAK VELOCITY: 1.1 CM2/M2
ECHO AV PEAK GRADIENT: 7.35 MMHG
ECHO AV PEAK VELOCITY: 135.54 CM/S
ECHO AV PEAK VELOCITY: 135.54 CM/S
ECHO LA AREA 4C: 19.07 CM2
ECHO LA MAJOR AXIS: 3.98 CM
ECHO LA MINOR AXIS: 2.08 CM
ECHO LA VOL 2C: 35.99 ML (ref 22–52)
ECHO LA VOL 4C: 53.03 ML (ref 22–52)
ECHO LA VOL BP: 49.84 ML (ref 22–52)
ECHO LA VOL/BSA BIPLANE: 26 ML/M2 (ref 16–28)
ECHO LA VOLUME INDEX A2C: 18.77 ML/M2 (ref 16–28)
ECHO LA VOLUME INDEX A4C: 27.66 ML/M2 (ref 16–28)
ECHO LV EDV A2C: 73.56 ML
ECHO LV EDV A4C: 96.45 ML
ECHO LV EDV BP: 89.04 ML (ref 56–104)
ECHO LV EDV INDEX A4C: 50.3 ML/M2
ECHO LV EDV INDEX BP: 46.4 ML/M2
ECHO LV EDV NDEX A2C: 38.4 ML/M2
ECHO LV EJECTION FRACTION A2C: 57 PERCENT
ECHO LV EJECTION FRACTION A4C: 56 PERCENT
ECHO LV EJECTION FRACTION BIPLANE: 55.8 PERCENT (ref 55–100)
ECHO LV ESV A2C: 31.65 ML
ECHO LV ESV A4C: 42.74 ML
ECHO LV ESV BP: 39.36 ML (ref 19–49)
ECHO LV ESV INDEX A2C: 16.5 ML/M2
ECHO LV ESV INDEX A4C: 22.3 ML/M2
ECHO LV ESV INDEX BP: 20.5 ML/M2
ECHO LV INTERNAL DIMENSION DIASTOLIC: 4.63 CM (ref 3.9–5.3)
ECHO LV INTERNAL DIMENSION SYSTOLIC: 2.87 CM
ECHO LV IVSD: 0.93 CM (ref 0.6–0.9)
ECHO LV MASS 2D: 155.1 G (ref 67–162)
ECHO LV MASS INDEX 2D: 80.9 G/M2 (ref 43–95)
ECHO LV POSTERIOR WALL DIASTOLIC: 1.02 CM (ref 0.6–0.9)
ECHO LVOT DIAM: 1.99 CM
ECHO LVOT PEAK GRADIENT: 3.13 MMHG
ECHO LVOT PEAK VELOCITY: 88.47 CM/S
ECHO MV A VELOCITY: 58.5 CM/S
ECHO MV AREA PHT: 2.28 CM2
ECHO MV E DECELERATION TIME (DT): 0.33 S
ECHO MV E VELOCITY: 46.96 CM/S
ECHO MV E/A RATIO: 0.8
ECHO MV PRESSURE HALF TIME (PHT): 0.1 S
ECHO PV MAX VELOCITY: 86.86 CM/S
ECHO PV MAX VELOCITY: 86.86 CM/S
ECHO PV PEAK INSTANTANEOUS GRADIENT SYSTOLIC: 3.02 MMHG
ECHO RV INTERNAL DIMENSION: 4.21 CM
ECHO RV TAPSE: 2.71 CM (ref 1.5–2)
ECHO TV REGURGITANT MAX VELOCITY: 142.32 CM/S
ECHO TV REGURGITANT PEAK GRADIENT: 8.1 MMHG
ECHO TV REGURGITANT PEAK GRADIENT: 8.1 MMHG
EOSINOPHIL # BLD: 0.2 K/UL (ref 0–0.4)
EOSINOPHIL NFR BLD: 4 % (ref 0–7)
ERYTHROCYTE [DISTWIDTH] IN BLOOD BY AUTOMATED COUNT: 13.2 % (ref 11.5–14.5)
GLOBULIN SER CALC-MCNC: 3.3 G/DL (ref 2–4)
GLUCOSE SERPL-MCNC: 102 MG/DL (ref 65–100)
HCT VFR BLD AUTO: 38.2 % (ref 35–47)
HDLC SERPL-MCNC: 42 MG/DL
HDLC SERPL: 2.4 {RATIO} (ref 0–5)
HGB BLD-MCNC: 12.7 G/DL (ref 11.5–16)
IMM GRANULOCYTES # BLD AUTO: 0 K/UL (ref 0–0.04)
IMM GRANULOCYTES NFR BLD AUTO: 0 % (ref 0–0.5)
INR PPP: 1.1 (ref 0.9–1.1)
LDLC SERPL CALC-MCNC: 43.6 MG/DL (ref 0–100)
LIPID PROFILE,FLP: NORMAL
LYMPHOCYTES # BLD: 2.6 K/UL (ref 0.8–3.5)
LYMPHOCYTES NFR BLD: 53 % (ref 12–49)
MAGNESIUM SERPL-MCNC: 2.2 MG/DL (ref 1.6–2.4)
MCH RBC QN AUTO: 30.7 PG (ref 26–34)
MCHC RBC AUTO-ENTMCNC: 33.2 G/DL (ref 30–36.5)
MCV RBC AUTO: 92.3 FL (ref 80–99)
MONOCYTES # BLD: 0.4 K/UL (ref 0–1)
MONOCYTES NFR BLD: 8 % (ref 5–13)
NEUTS SEG # BLD: 1.7 K/UL (ref 1.8–8)
NEUTS SEG NFR BLD: 34 % (ref 32–75)
NRBC # BLD: 0 K/UL (ref 0–0.01)
NRBC BLD-RTO: 0 PER 100 WBC
P-R INTERVAL, ECG05: 210 MS
P-R INTERVAL, ECG05: 250 MS
PLATELET # BLD AUTO: 222 K/UL (ref 150–400)
PMV BLD AUTO: 10.4 FL (ref 8.9–12.9)
POTASSIUM SERPL-SCNC: 4 MMOL/L (ref 3.5–5.1)
PROT SERPL-MCNC: 6.9 G/DL (ref 6.4–8.2)
PROTHROMBIN TIME: 11.1 SEC (ref 9–11.1)
Q-T INTERVAL, ECG07: 376 MS
Q-T INTERVAL, ECG07: 446 MS
QRS DURATION, ECG06: 76 MS
QRS DURATION, ECG06: 84 MS
QTC CALCULATION (BEZET), ECG08: 439 MS
QTC CALCULATION (BEZET), ECG08: 456 MS
RBC # BLD AUTO: 4.14 M/UL (ref 3.8–5.2)
SODIUM SERPL-SCNC: 141 MMOL/L (ref 136–145)
STRESS BASELINE HR: 65 BPM
STRESS ESTIMATED WORKLOAD: 1 METS
STRESS EXERCISE DUR MIN: NORMAL
STRESS PEAK DIAS BP: 69 MMHG
STRESS PEAK SYS BP: 115 MMHG
STRESS PERCENT HR ACHIEVED: 58 %
STRESS POST PEAK HR: 97 BPM
STRESS RATE PRESSURE PRODUCT: NORMAL BPM*MMHG
STRESS TARGET HR: 167 BPM
TRIGL SERPL-MCNC: 82 MG/DL (ref ?–150)
TROPONIN I SERPL-MCNC: <0.05 NG/ML
TROPONIN I SERPL-MCNC: <0.05 NG/ML
VENTRICULAR RATE, ECG03: 63 BPM
VENTRICULAR RATE, ECG03: 82 BPM
VLDLC SERPL CALC-MCNC: 16.4 MG/DL
WBC # BLD AUTO: 4.9 K/UL (ref 3.6–11)

## 2020-09-16 PROCEDURE — 99218 HC RM OBSERVATION: CPT

## 2020-09-16 PROCEDURE — 85025 COMPLETE CBC W/AUTO DIFF WBC: CPT

## 2020-09-16 PROCEDURE — 36415 COLL VENOUS BLD VENIPUNCTURE: CPT

## 2020-09-16 PROCEDURE — 96375 TX/PRO/DX INJ NEW DRUG ADDON: CPT

## 2020-09-16 PROCEDURE — 74011250636 HC RX REV CODE- 250/636: Performed by: INTERNAL MEDICINE

## 2020-09-16 PROCEDURE — 78452 HT MUSCLE IMAGE SPECT MULT: CPT

## 2020-09-16 PROCEDURE — 74011250636 HC RX REV CODE- 250/636: Performed by: FAMILY MEDICINE

## 2020-09-16 PROCEDURE — 74011250636 HC RX REV CODE- 250/636: Performed by: SPECIALIST

## 2020-09-16 PROCEDURE — 84484 ASSAY OF TROPONIN QUANT: CPT

## 2020-09-16 PROCEDURE — 80053 COMPREHEN METABOLIC PANEL: CPT

## 2020-09-16 PROCEDURE — 74011250636 HC RX REV CODE- 250/636: Performed by: NURSE PRACTITIONER

## 2020-09-16 PROCEDURE — 83735 ASSAY OF MAGNESIUM: CPT

## 2020-09-16 PROCEDURE — 74011000250 HC RX REV CODE- 250: Performed by: NURSE PRACTITIONER

## 2020-09-16 PROCEDURE — 93005 ELECTROCARDIOGRAM TRACING: CPT

## 2020-09-16 PROCEDURE — 99204 OFFICE O/P NEW MOD 45 MIN: CPT | Performed by: SPECIALIST

## 2020-09-16 PROCEDURE — 85610 PROTHROMBIN TIME: CPT

## 2020-09-16 PROCEDURE — 74011250636 HC RX REV CODE- 250/636: Performed by: EMERGENCY MEDICINE

## 2020-09-16 PROCEDURE — 96376 TX/PRO/DX INJ SAME DRUG ADON: CPT

## 2020-09-16 PROCEDURE — 93306 TTE W/DOPPLER COMPLETE: CPT

## 2020-09-16 PROCEDURE — A9500 TC99M SESTAMIBI: HCPCS

## 2020-09-16 PROCEDURE — 74011250637 HC RX REV CODE- 250/637: Performed by: NURSE PRACTITIONER

## 2020-09-16 PROCEDURE — 80061 LIPID PANEL: CPT

## 2020-09-16 PROCEDURE — 96374 THER/PROPH/DIAG INJ IV PUSH: CPT

## 2020-09-16 RX ORDER — MORPHINE SULFATE 2 MG/ML
1 INJECTION, SOLUTION INTRAMUSCULAR; INTRAVENOUS
Status: DISCONTINUED | OUTPATIENT
Start: 2020-09-16 | End: 2020-09-16 | Stop reason: HOSPADM

## 2020-09-16 RX ORDER — FENOFIBRATE 145 MG/1
145 TABLET, COATED ORAL DAILY
Status: DISCONTINUED | OUTPATIENT
Start: 2020-09-16 | End: 2020-09-16 | Stop reason: HOSPADM

## 2020-09-16 RX ORDER — ENOXAPARIN SODIUM 100 MG/ML
40 INJECTION SUBCUTANEOUS DAILY
Status: DISCONTINUED | OUTPATIENT
Start: 2020-09-16 | End: 2020-09-16 | Stop reason: HOSPADM

## 2020-09-16 RX ORDER — ONDANSETRON 2 MG/ML
4 INJECTION INTRAMUSCULAR; INTRAVENOUS
Status: COMPLETED | OUTPATIENT
Start: 2020-09-16 | End: 2020-09-16

## 2020-09-16 RX ORDER — CLOPIDOGREL BISULFATE 75 MG/1
75 TABLET ORAL DAILY
Status: DISCONTINUED | OUTPATIENT
Start: 2020-09-16 | End: 2020-09-16 | Stop reason: HOSPADM

## 2020-09-16 RX ORDER — PANTOPRAZOLE SODIUM 40 MG/1
40 TABLET, DELAYED RELEASE ORAL
Status: DISCONTINUED | OUTPATIENT
Start: 2020-09-16 | End: 2020-09-16 | Stop reason: HOSPADM

## 2020-09-16 RX ORDER — SODIUM CHLORIDE 0.9 % (FLUSH) 0.9 %
5-40 SYRINGE (ML) INJECTION EVERY 8 HOURS
Status: DISCONTINUED | OUTPATIENT
Start: 2020-09-16 | End: 2020-09-16 | Stop reason: HOSPADM

## 2020-09-16 RX ORDER — ROSUVASTATIN CALCIUM 40 MG/1
40 TABLET, COATED ORAL
Status: DISCONTINUED | OUTPATIENT
Start: 2020-09-16 | End: 2020-09-16 | Stop reason: HOSPADM

## 2020-09-16 RX ORDER — ASPIRIN 81 MG/1
81 TABLET ORAL DAILY
Status: DISCONTINUED | OUTPATIENT
Start: 2020-09-16 | End: 2020-09-16

## 2020-09-16 RX ORDER — LANOLIN ALCOHOL/MO/W.PET/CERES
3 CREAM (GRAM) TOPICAL
Status: DISCONTINUED | OUTPATIENT
Start: 2020-09-16 | End: 2020-09-16 | Stop reason: HOSPADM

## 2020-09-16 RX ORDER — ASPIRIN 81 MG/1
162 TABLET ORAL DAILY
Status: DISCONTINUED | OUTPATIENT
Start: 2020-09-16 | End: 2020-09-16 | Stop reason: HOSPADM

## 2020-09-16 RX ORDER — ONDANSETRON 2 MG/ML
4 INJECTION INTRAMUSCULAR; INTRAVENOUS
Status: DISCONTINUED | OUTPATIENT
Start: 2020-09-16 | End: 2020-09-16 | Stop reason: HOSPADM

## 2020-09-16 RX ORDER — ACETAMINOPHEN 325 MG/1
650 TABLET ORAL
Status: DISCONTINUED | OUTPATIENT
Start: 2020-09-16 | End: 2020-09-16 | Stop reason: HOSPADM

## 2020-09-16 RX ORDER — EZETIMIBE 10 MG/1
10 TABLET ORAL DAILY
Status: DISCONTINUED | OUTPATIENT
Start: 2020-09-16 | End: 2020-09-16 | Stop reason: HOSPADM

## 2020-09-16 RX ORDER — POLYETHYLENE GLYCOL 3350 17 G/17G
17 POWDER, FOR SOLUTION ORAL DAILY PRN
Status: DISCONTINUED | OUTPATIENT
Start: 2020-09-16 | End: 2020-09-16 | Stop reason: HOSPADM

## 2020-09-16 RX ORDER — MECLIZINE HYDROCHLORIDE 25 MG/1
25 TABLET ORAL
COMMUNITY

## 2020-09-16 RX ORDER — ALPRAZOLAM 0.5 MG/1
0.5 TABLET ORAL DAILY PRN
Status: DISCONTINUED | OUTPATIENT
Start: 2020-09-16 | End: 2020-09-16 | Stop reason: HOSPADM

## 2020-09-16 RX ORDER — SODIUM CHLORIDE 0.9 % (FLUSH) 0.9 %
20 SYRINGE (ML) INJECTION
Status: COMPLETED | OUTPATIENT
Start: 2020-09-16 | End: 2020-09-16

## 2020-09-16 RX ORDER — ACETAMINOPHEN 650 MG/1
650 SUPPOSITORY RECTAL
Status: DISCONTINUED | OUTPATIENT
Start: 2020-09-16 | End: 2020-09-16 | Stop reason: HOSPADM

## 2020-09-16 RX ORDER — SODIUM CHLORIDE 0.9 % (FLUSH) 0.9 %
5-40 SYRINGE (ML) INJECTION AS NEEDED
Status: DISCONTINUED | OUTPATIENT
Start: 2020-09-16 | End: 2020-09-16 | Stop reason: HOSPADM

## 2020-09-16 RX ORDER — ONDANSETRON 2 MG/ML
INJECTION INTRAMUSCULAR; INTRAVENOUS
Status: DISCONTINUED
Start: 2020-09-16 | End: 2020-09-16

## 2020-09-16 RX ORDER — MORPHINE SULFATE 2 MG/ML
1 INJECTION, SOLUTION INTRAMUSCULAR; INTRAVENOUS ONCE
Status: COMPLETED | OUTPATIENT
Start: 2020-09-16 | End: 2020-09-16

## 2020-09-16 RX ADMIN — ALPRAZOLAM 0.5 MG: 0.5 TABLET ORAL at 05:38

## 2020-09-16 RX ADMIN — SODIUM CHLORIDE 500 ML: 9 INJECTION, SOLUTION INTRAVENOUS at 17:50

## 2020-09-16 RX ADMIN — Medication 10 ML: at 05:06

## 2020-09-16 RX ADMIN — SODIUM CHLORIDE, SODIUM LACTATE, POTASSIUM CHLORIDE, AND CALCIUM CHLORIDE 1000 ML: 600; 310; 30; 20 INJECTION, SOLUTION INTRAVENOUS at 04:47

## 2020-09-16 RX ADMIN — Medication 10 ML: at 14:38

## 2020-09-16 RX ADMIN — EZETIMIBE 10 MG: 10 TABLET ORAL at 09:54

## 2020-09-16 RX ADMIN — Medication 20 ML: at 12:56

## 2020-09-16 RX ADMIN — FENOFIBRATE 145 MG: 145 TABLET ORAL at 09:54

## 2020-09-16 RX ADMIN — ENOXAPARIN SODIUM 40 MG: 40 INJECTION SUBCUTANEOUS at 09:54

## 2020-09-16 RX ADMIN — REGADENOSON 0.4 MG: 0.08 INJECTION, SOLUTION INTRAVENOUS at 12:56

## 2020-09-16 RX ADMIN — PANTOPRAZOLE SODIUM 40 MG: 40 TABLET, DELAYED RELEASE ORAL at 07:08

## 2020-09-16 RX ADMIN — MORPHINE SULFATE 1 MG: 2 INJECTION, SOLUTION INTRAMUSCULAR; INTRAVENOUS at 11:30

## 2020-09-16 RX ADMIN — LIDOCAINE HYDROCHLORIDE 40 ML: 20 SOLUTION ORAL; TOPICAL at 04:46

## 2020-09-16 RX ADMIN — ONDANSETRON 4 MG: 2 INJECTION INTRAMUSCULAR; INTRAVENOUS at 02:05

## 2020-09-16 RX ADMIN — ONDANSETRON 4 MG: 2 INJECTION INTRAMUSCULAR; INTRAVENOUS at 11:37

## 2020-09-16 RX ADMIN — ASPIRIN 162 MG: 81 TABLET, COATED ORAL at 09:54

## 2020-09-16 RX ADMIN — MORPHINE SULFATE 1 MG: 2 INJECTION, SOLUTION INTRAMUSCULAR; INTRAVENOUS at 04:47

## 2020-09-16 RX ADMIN — CLOPIDOGREL BISULFATE 75 MG: 75 TABLET ORAL at 09:54

## 2020-09-16 NOTE — DISCHARGE INSTRUCTIONS
Discharge Instructions       PATIENT ID: Gloria Munguia  MRN: [id number]   YOB: 1965    DATE OF ADMISSION: 9/15/2020  8:07 PM    DATE OF DISCHARGE: 9/16/2020    PRIMARY CARE PROVIDER: Junior Simons MD     ATTENDING PHYSICIAN: Abram Gonzalez DO  DISCHARGING PROVIDER: Ham Mckinonn DO    To contact this individual call [phone] and ask the  to page. If unavailable ask to be transferred the Adult Hospitalist Department. DISCHARGE DIAGNOSES     Chest pain, not consistent with cardiac etiology     CONSULTATIONS: IP CONSULT TO HOSPITALIST  IP CONSULT TO CARDIOLOGY    PROCEDURES/SURGERIES: * No surgery found *    PENDING TEST RESULTS:   At the time of discharge the following test results are still pending: none    FOLLOW UP APPOINTMENTS:   Follow-up Information     Follow up With Specialties Details Why Contact Info    Junior Simons MD Internal Medicine   [place] [id number] [address] [facility]             ADDITIONAL CARE RECOMMENDATIONS:     Resume home medications on discharge    DISCHARGE MEDICATIONS:   See Medication Reconciliation Form    · It is important that you take the medication exactly as they are prescribed. · Keep your medication in the bottles provided by the pharmacist and keep a list of the medication names, dosages, and times to be taken in your wallet. · Do not take other medications without consulting your doctor. NOTIFY YOUR PHYSICIAN FOR ANY OF THE FOLLOWING:   Fever over 101 degrees for 24 hours. Chest pain, shortness of breath, fever, chills, nausea, vomiting, diarrhea, change in mentation, falling, weakness, bleeding. Severe pain or pain not relieved by medications. Or, any other signs or symptoms that you may have questions about.       Signed:   Ham Mckinnon DO  9/16/2020  5:56 PM

## 2020-09-16 NOTE — PROGRESS NOTES
RAVINDER:    RUR N/A    Patient lives at home with her 2 adult children. One of them will transport at discharge. Care Management Interventions  PCP Verified by CM: Yes  Mode of Transport at Discharge: (One of her adult children will transport.)  MyChart Signup: Yes  Discharge Durable Medical Equipment: No  Physical Therapy Consult: No  Occupational Therapy Consult: No  Speech Therapy Consult: No  Current Support Network: Other(Lives with her 2 adult children.)  Confirm Follow Up Transport: Family  Discharge Location  Discharge Placement: Home with family assistance    Reason for Admission:   Chest Pain                   RUR Score:  N/A                  Plan for utilizing home health:     No orders     PCP: First and Last name:  Michael Merritt   Name of Practice:    Are you a current patient: Yes/No: Yes   Approximate date of last visit: 9/1/2020   Can you participate in a virtual visit with your PCP:                 Yes    Current Advanced Directive/Advance Care Plan:         Patient has Advanced Directive, but a copy is not on file at [facility]. CM asked for copy. Transition of Care Plan:                      CM met with patient to introduce CM role, verify demographics and begin discharge planning. Patient lives at home with her 2 adult children. She does not use any DME. Patient is independent at home. She drives to appointments as necessary. Patient gets her prescriptions filled at [facility].    Patient's children will pick her up at discharge. Insurance verified: [facility] [employer] [facility]     Boston Pitts, COLIN/CRM

## 2020-09-16 NOTE — ED PROVIDER NOTES
Ms. Kelly Ba is a 52yo female who presents to the ER with complaints of chest pain. She states that her pain started at 4 PM today. She has had a number of episodes today. The pain is described as a pressure, squeezing sensation in her left chest that radiates down her left arm. She said it only lasted a short period of time at a time. However, she denies similar symptoms to this in the past.  She said that she has felt little lightheaded and dizzy over the last few days. No nausea or vomiting. No trouble breathing. She reports having heart attack in the past and has multiple stents in her heart. She is currently pain-free during my interview. She took 160 mg of aspirin this morning and another baby aspirin this evening. She denies any changes in her ability to exert herself over the last few weeks to months.            Past Medical History:   Diagnosis Date    CAD (coronary artery disease)     Gastrointestinal disorder     hiatal hernia    Hypertension     Ill-defined condition     vertigo    MI (myocardial infarction) ([facility] [address]        Past Surgical History:   Procedure Laterality Date    CARDIAC SURG PROCEDURE UNLIST  12    stents x 3    CARDIAC SURG PROCEDURE UNLIST  10/2013    stents x 3    HX  SECTION      three    HX TUBAL LIGATION           Family History:   Problem Relation Age of Onset    Elevated Lipids Father     Heart Disease Father 46        heart attack    Elevated Lipids Brother     Elevated Lipids Paternal Grandfather     Heart Disease Paternal Grandfather     Hypertension Paternal Grandfather     Stroke Paternal Grandfather        Social History     Socioeconomic History    Marital status: LEGALLY      Spouse name: Not on file    Number of children: Not on file    Years of education: Not on file    Highest education level: Not on file   Occupational History    Not on file   Social Needs    Financial resource strain: Not on file    Food insecurity Worry: Not on file     Inability: Not on file    Transportation needs     Medical: Not on file     Non-medical: Not on file   Tobacco Use    Smoking status: Former Smoker     Packs/day: 2.00     Years: 30.00     Pack years: 60.00     Types: Cigarettes     Last attempt to quit: 2012     Years since quittin.2    Smokeless tobacco: Never Used   Substance and Sexual Activity    Alcohol use: No     Alcohol/week: 0.0 standard drinks    Drug use: No    Sexual activity: Yes     Partners: Male   Lifestyle    Physical activity     Days per week: Not on file     Minutes per session: Not on file    Stress: Not on file   Relationships    Social connections     Talks on phone: Not on file     Gets together: Not on file     Attends Judaism service: Not on file     Active member of club or organization: Not on file     Attends meetings of clubs or organizations: Not on file     Relationship status: Not on file    Intimate partner violence     Fear of current or ex partner: Not on file     Emotionally abused: Not on file     Physically abused: Not on file     Forced sexual activity: Not on file   Other Topics Concern    Not on file   Social History Narrative    Not on file         ALLERGIES: Erythromycin; Demerol [meperidine]; and Sulfa (sulfonamide antibiotics)    Review of Systems   Constitutional: Negative for chills and fever. HENT: Negative for rhinorrhea and sore throat. Respiratory: Positive for chest tightness. Negative for cough and shortness of breath. Cardiovascular: Positive for chest pain. Gastrointestinal: Negative for abdominal pain, diarrhea, nausea and vomiting. Genitourinary: Negative for dysuria and urgency. Musculoskeletal:        Left arm pain   Skin: Negative for rash. Neurological: Positive for dizziness and light-headedness. Negative for weakness.        Vitals:    09/15/20 2011   BP: 135/69   Pulse: 78   Resp: 16   Temp: 98.6 °F (37 °C)   SpO2: 100%   Weight: 89.1 kg (196 lb 6.9 oz)   Height: 5' 3\" (1.6 m)            Physical Exam     Vital signs reviewed. Nursing notes reviewed. Const:  No acute distress, well developed, well nourished  Head:  Atraumatic, normocephalic  Eyes:  PERRL, conjunctiva normal, no scleral icterus  Neck:  Supple, trachea midline  Cardiovascular:  Regular rate  Resp:  No resp distress, no increased work of breathing  Abd:  Soft, non-tender, non-distended, no rebound  MSK:  No pedal edema, normal ROM  Neuro:  Alert and oriented x3, no cranial nerve defect  Skin:  Warm, dry, intact  Psych: normal mood and affect, behavior is normal, judgement and thought content is normal            MDM  Number of Diagnoses or Management Options  Acute chest pain:      Amount and/or Complexity of Data Reviewed  Clinical lab tests: ordered and reviewed  Tests in the radiology section of CPT®: ordered and reviewed  Review and summarize past medical records: yes    Patient Progress  Patient progress: stable          Ms. Essence Diaz is a 52yo female who presents to the ER with complaints of chest pain. Her chest pain is concerning for ACS. Negative troponin. Pt. To be admitted to [facility] under the hospitalist for further care.       Procedures

## 2020-09-16 NOTE — PROGRESS NOTES
Bedside shift change report given to Marci (oncoming nurse) by Hadley Lan (offgoing nurse). Report included the following information SBAR, Kardex, MAR, Accordion and Cardiac Rhythm NSR.

## 2020-09-16 NOTE — H&P
[address] [facility] Adult  Hospitalist Group  History and Physical    Primary Care Provider: Kika Vasquez MD  Date of Service:  9/16/2020    Subjective: Cristian Machado is a 50 y.o. female who presented to ED with chief complaint of chest pain which started last night around 1600 associated with nausea. She has a significant history of CAD with 6 stents, last one in 2013, and a history of familial hypercholesterolemia. She also has a history of anxiety as well as GERD for which she takes Protonix and GI cocktail at home. Work-up in ED included normal CBC, UA negative for UTI but with trace leukocytes and trace blood, chemistry with creatinine 1.16, negative troponin, negative chest x-ray. No record of EKG in ED. Spoke with Dr. Zoie Browne who was able to send pic through [facility] and EKG shows SR w/ 1st degree AV block, nonspecific ST and T wave abnormality. The patient is normally seen at [facility] for her cardiac issues and takes both metoprolol and sotalol for a history of PVCs and SVT. She has had significant stress recently as her ex- has a recurrence of lung cancer which is now metastatic. She has been caring for him as he has no other family at this time. The patient reports some \"burning\" to the left chest but denies chest pressure, headache, dizziness, cough, shortness of breath, abdominal pain, nausea vomiting diarrhea or dysuria. Nausea was relieved with Zofran. [facility] checked and shows that patient has a current prescription for Xanax 0.5 mg, 30 pills for a 30-day supply as well as temazepam 30 mg, 30 pills for 30-day supply. Patient states she does not usually take the temazepam because it makes her quite groggy in the morning. Admission discussed with Dr. Rose Hendricks.    Review of Systems:    A comprehensive review of systems was negative except for that written in the History of Present Illness.      Past Medical History:   Diagnosis Date    CAD (coronary artery disease)     Gastrointestinal disorder     hiatal hernia    Hypertension     Ill-defined condition     vertigo    MI [facility] infarction[facility])       Past Surgical History:   Procedure Laterality Date    CARDIAC SURG PROCEDURE UNLIST  12    stents x 3    CARDIAC SURG PROCEDURE UNLIST  10/2013    stents x 3    HX  SECTION      three    HX TUBAL LIGATION       Prior to Admission medications    Medication Sig Start Date End Date Taking? Authorizing Provider   meclizine (ANTIVERT) 25 mg tablet Take 25 mg by mouth three (3) times daily as needed for Dizziness. Yes Provider, Historical   diclofenac (VOLTAREN) 1 % gel APPLY 1 GRAM TO AFFECTED AREA 3 TIMES A DAY AS NEEDED 10/14/19  Yes Provider, Historical   KLOR-CON M20 20 mEq tablet TAKE 1 TABLET BY MOUTH TWICE A DAY WITH FOOD 19  Yes Provider, Historical   traMADol (ULTRAM) 50 mg tablet TAKE 1 TABLET BY MOUTH EVERY 6 TO 8 HRS AS NEEDED FOR PAIN 10/10/19  Yes Provider, Historical   metoprolol succinate (TOPROL-XL) 50 mg XL tablet Take 50 mg by mouth daily. Yes Other, MD Julieta   ondansetron (ZOFRAN ODT) 8 mg disintegrating tablet Take 1 Tab by mouth every eight (8) hours as needed for Nausea. 3/23/19  Yes Nato Walls MD   pantoprazole (PROTONIX) 40 mg tablet Take 40 mg by mouth daily. Yes Other, MD Julieta   ALPRAZolam (XANAX) 1 mg tablet Take 1 mg by mouth three (3) times daily as needed for Anxiety. Yes Provider, Historical   ezetimibe (ZETIA) 10 mg tablet Take 10 mg by mouth daily. Yes Provider, Historical   aspirin delayed-release 81 mg tablet TAKE 2 TABLETS BY MOUTH DAILY 17  Yes Jose Koch MD   SOTALOL HCL (SOTALOL PO) Take 60 mg by mouth two (2) times a day. Yes Provider, Historical   temazepam (RESTORIL) 30 mg capsule Take 30 mg by mouth nightly as needed for Sleep. Yes Provider, Historical   rosuvastatin (CRESTOR) 40 mg tablet Take 40 mg by mouth nightly.    Yes Provider, Historical   fenofibrate (LOFIBRA) 160 mg tablet Take 1 Tab by mouth daily. 10/5/16  Yes Kendy Scott MD   lisinopril (PRINIVIL, ZESTRIL) 5 mg tablet Take 1 Tab by mouth daily. 3/1/16  Yes Kendy Scott MD   clopidogrel (PLAVIX) 75 mg tablet Take 1 Tab by mouth daily. 3/1/16  Yes Kendy Scott MD   OTHER,NON-FORMULARY, Pt takes a \"GI Cocktail\" formulated by Dr. Aaron Garcia   Yes Provider, Historical     Allergies   Allergen Reactions    Erythromycin Anaphylaxis    Demerol [Meperidine] Other (comments)     hallucination    Sulfa (Sulfonamide Antibiotics) Other (comments)      Family History   Problem Relation Age of Onset    Elevated Lipids Father     Heart Disease Father 46        heart attack    Elevated Lipids Brother     Elevated Lipids Paternal Grandfather     Heart Disease Paternal Grandfather     Hypertension Paternal Grandfather     Stroke Paternal Grandfather         SOCIAL HISTORY:  Patient resides at home  Patient ambulates with walking   Smoking history: Former  Alcohol history: Denies    Objective:       Physical Exam:   General:  Alert, cooperative, no distress, appears stated age, obese   Head:  Normocephalic, without obvious abnormality, atraumatic. Eyes:  Conjunctivae/corneas clear. Pupils equal, round, reactive to light. Lungs:   Clear to auscultation bilaterally   Heart:  Regular rate and rhythm, S1, S2 normal, no murmur, click, rub, or gallop. Abdomen:   Soft, non-tender/non-distended. Bowel sounds normal   Extremities: Extremities normal, atraumatic, no cyanosis or edema. Pulses: 2+ and symmetric all extremities. Skin: Skin color, texture, turgor normal. No rashes or lesions. Neurologic: CNII-XII grossly intact. Normal strength. A&Ox4       ECG:  Ordered stat; No record of EKG in ED. Spoke with Dr. Soumya Chávez who was able to send pic through [facility] and EKG shows SR w/ 1st degree AV block, nonspecific ST and T wave abnormality. Data Review:    All diagnostic labs and studies have been reviewed. Chest x-ray:   CXR Results  (Last 48 hours)               09/15/20 2053  XR CHEST PA LAT Final result    Impression:  Impression: No acute process or change compared to the prior exam.           Narrative:  Exam:  2 view chest       Indication: Chest pain       Comparison to 12/12/2019. PA and lateral views demonstrate normal heart size. Coronary artery stents are   noted. There is no acute process in the lung fields. The osseous structures are   unremarkable. Assessment:     Principal Problem:    Acute chest pain (9/15/2020)    Active Problems:    CAD (coronary artery disease) (9/4/2015)      Overview: 6/12 acute mi, stent to occluded lcx            10/13 unstable angina, stents to rca. Widely patent LCX stents, 30-40%       prox LAD stenosis with widely paten mid vessel stent. RCA 50% ostial,       75%prox,           70% mid and 90% prox GURWINDER. 4 by 20mm ostial queta with       overlapping 4 by 32mm queta in prox/mid vessel. 2.5 by 18mm queta to prox GURWINDER.            6/12 normal carotid dopplers      9/14 bilateral 10-49% carotid stenoses      2/13 normal stress cardiolyte lvef 65%      1/14 normal stress echo      10/14 normal stress echo            Familial hypercholesterolemia (9/4/2015)      Generalized anxiety disorder (9/11/2015)      GERD (gastroesophageal reflux disease) (11/1/2019)      Hypertension ()      JAMEEL (acute kidney injury) ([facility] [address] ()        Plan:     Chest Pain: hx CAD w/ 6 stents  -initial trop negative, trend  -EKG ordered stat; No record of EKG in ED.  Spoke with Dr. Mariel Bhakta who was able to send pic through [address] [place] [address] and EKG shows SR w/ 1st degree AV block, nonspecific ST and T wave abnormality.  -check echo  -PRN morphine  -lovenox, plavix, asa; give GI cocktail  -check lipid panel  -cardiology consult    JAMEEL: POA creat/gfr 1.16/49  -suspect d/t poor PO intake (chronic per pt)  -Give 1L IVF  -avoid nephrotoxoins  -monitor kidney function    Hypotension  -Hx hypertension  -hold antihypertensives  -give 1L IVF  -check echo  -monitor BP on left arm   -check orthostatics    Familial HLD  -check lipid panel  -continue ASA, crestor, zetia    Hx ETHEL  -Cont home xanax 0.5mg daily PRN    GERD  -continue home protonix, GI cocktail    DVTppx: SCDs, SCDs  Gippx: Protonix  Code Status: Full code  Diet: Cardiac  Activity: OOB to chair TID and PRN  Discharge: TBD          Signed By: Oj Mendez NP     September 16, 2020

## 2020-09-16 NOTE — PROGRESS NOTES
51 y/o female with past medical history of CAD, MI, stents, familial hyperlipidemia, anxiety, SVT, GERD, vertigo presented as direct admission/ [facility] with chief complaint of chest pain. She normally is followed by her cardiologist Dr. Jenni Morris at [facility].  She reportedly had chest pain, pressure, squeezing in left anterior chest radiating to left arm with associated dizziness and lightheadedness. She reportedly took  mg po and additional 81 mg po x 1 dose. Workup at [facility] included 12 lead EKG showing sinus rhythm with 1st degree AV block, nonspecific ST and T wave abnormality at 82 bpm.  Troponin was negative. Initial VS:  T = 97.3 F  BP =  117/69 HR = 60 RR = 16  O2sat= 97% on room air    PE:  GEN-NAD  PSYCH-A&Ox3  NEURO-GCS 15. Moves all extremities x 4. No slurred speech. No facial droop. Sensation grossly intact. HEENT-NCAT/pupils 2 mm reactive bilateral. Oropharynx clear. NECK-supple, trachea midline  LUNGS-CTA B  HEART-RRR  ABD-soft, NT,ND,  +BS. No R/G/R  VASCULAR-2+ radial/1+DP pulses bilateral. No C/C/E  SKIN-warm/dry  MS-no calf tenderness    A/P:    Patient seen and evaluated. I have reviewed H&P by Moni Pool NP. Agree with majority of evaluation and plan but had not discussed patient's outpatient prescribing history taking Xanax and Temazepam as an outpatient. She will need to follow up with her PCP on discharge regarding the same. 2d echocardiogram has been ordered and consult with cardiologist today.   Possible NM stress test.

## 2020-09-16 NOTE — CONSULTS
Cardiology Consult Note      Patient Name: Angel Cage  : 1965 MRN: [id number]  Date: 2020  Time: 10:54 AM    Admit Diagnosis: Acute chest pain [R07.9]    Primary Cardiologist: Dr. Paulina Doran MD and Dr. Erik Santacruz ([facility])   Consulting Cardiologist: Renae Valentin M.D.  Reason for Consult: chest pain    Requesting MD:  Dr. Roshan Stringer MD    HPI:  Angel Cage is a 50 y.o. female admitted on 9/15/2020  for Acute chest pain [R07.9]. Has PMH of MI, CAD s/p 6 stents (last in ), familial HLD, HTN, NSVT s/p ablation (2018), PVCs on sotolol and toprol XL, carotid stenosis, anxiety. Last LHC was in  which showed noncritical CAD (30% ostial RCA). Has hx of intermittent chest pain but states that this pain that started yesterday at 4 PM is different. HAs had intermittent chest pain since yesterday at 4 PM.   Pain is midsternal and radiates to left shoulder. Has had some dizziness but no SOB, lightheadedness or associated palpitations. Pain lasts about 15 seconds. No identifiable aggravating or allevating factor. Has also experienced some burning in left chest not relieved with protonix or GI cocktail. Faiza President she took 2 additional baby ASA. Does not use nitro as it causes headache. She reports chronic fatigue but this is unchanged. Has been under increased stress caring for her ex  who has recurrent cancer. States when he had her MI in past she had extreme fatigue and felt foggy with chest and left arm pain. Reports compliance with medications. No history of CHF. Negative COVID test last week. Does report that she walks around her 2 acre property almost daily with no chest pain or SOB. SH: former smoker  FH: father had MI and CABG in his 46s     Subjective:  Currently denies chest pain but developed left chest burning during interview. No palpitations. This a. m.she was hypotensive with SBP in 80's.  Felt a little dizzy this a.m. Has received IVF and SBP now in 90's. Her sotolol and toprol have been held. Creatinine mildly elevated on admission but now normalized after IVF. Assessment and Plan     1. Chest pain:   -some atypical features. -Troponins negative, EKG with NSR,1st degree av block no ischemic ST/T changes   -Echo EF NL, Tr TR  09/15/20   ECHO ADULT COMPLETE 09/16/2020 9/16/2020    Narrative · LV: Estimated LVEF is 55 - 60%. Normal cavity size, wall thickness and   systolic function (ejection fraction normal). Mild (grade 1) left   ventricular diastolic dysfunction. Signed by: Kate Tobias MD      -Proceed with Lexiscan stress test today (unable to do stress echo due to HR in 60's, recent BB and sotolol). 2.  Hx of CAD s/p MI and stents (2012, 2013 stents)   -Continue ASA, plavix, statin   -BB on hold for now given hypotension    3. Hx of familial HLD   -LDL 43.6, HDL 42   -continue Crestor, zetia, fenofibrate    4. Hx of GERD   -On protonix     5. Hx of anxiety   -on xanax prn    6. Hx of HTN, now with hypotension   -BP improving- received IV fluids this a.m.   -Sotolol , Toprol XL, and lisinopril on hold for now. 7. Hx of NSVT and PVCs   -hx of NSVT ablation    -No NSVT on telemetry review.    -Once bp improves continue sotolol and toprol XL   -Follows with Dr. Wei Linda and reports ILR is under consideration- she has been hesitant. Says she has not quite qualified for ICD. Defer to Dr. Wei Linda. 50 y.o. female with PMH of CAD, NSVT, PVCs now presents with intermittent chest pain and chest burning sensation. Chest pain with some atypical features and she has ruled out for MI. But given her cardiac history will proceed with Nuclear stress test today. Would like to restart sotolol and toprol XL once BP improves as she has hx of NSVT. Cardiology attending: seen and examined. Agree with assess and plan  Chest pain is mostly atypical, ekg and trop unremarkable.  Chest clear, cv rrr no murmur, ext no edema. Would proceed with stress test given ongoing risk factors and no cath or stress in several years. Resume usual meds when able       Review of Symptoms:  Constitutional: No fevers or chills. HEET: + throat. Neck: No adenopathy or swelling. Heart: +chest pain, no palpitations. Lungs: No shortness of breath. No cough. Abdomen: No pain. No nausea of vomiting. No BRBPR or melena. No diarrhea. Urology: No dysuria or hematuria. Ext: No edema. Skin: No acute rashes or ulcers. Endocrine: No heat or cold intolerance.   Neuro: No transient neurologic deficit    Previous treatment/evaluation includes Percutaneous Coronary Intervention, echocardiogram and stress echocardiogram, lexiscan  Cardiac risk factors: smoking/ tobacco exposure, family history, dyslipidemia, hypertension, stress, post-menopausal.    Past Medical History:   Diagnosis Date    CAD (coronary artery disease)     Gastrointestinal disorder     hiatal hernia    Hypertension     Ill-defined condition     vertigo    MI (myocardial infarction) ([address]     PVC (premature ventricular contraction)     SVT (supraventricular tachycardia) ([address]      Past Surgical History:   Procedure Laterality Date    CARDIAC SURG PROCEDURE UNLIST  12    stents x 3    CARDIAC SURG PROCEDURE UNLIST  10/2013    stents x 3    HX  SECTION      three    HX TUBAL LIGATION       Current Facility-Administered Medications   Medication Dose Route Frequency    sodium chloride (NS) flush 5-40 mL  5-40 mL IntraVENous Q8H    sodium chloride (NS) flush 5-40 mL  5-40 mL IntraVENous PRN    acetaminophen (TYLENOL) tablet 650 mg  650 mg Oral Q6H PRN    Or    acetaminophen (TYLENOL) suppository 650 mg  650 mg Rectal Q6H PRN    polyethylene glycol (MIRALAX) packet 17 g  17 g Oral DAILY PRN    enoxaparin (LOVENOX) injection 40 mg  40 mg SubCUTAneous DAILY    morphine injection 1 mg  1 mg IntraVENous Q2H PRN    melatonin tablet 3 mg  3 mg Oral QHS PRN  ondansetron (ZOFRAN) injection 4 mg  4 mg IntraVENous Q4H PRN    ALPRAZolam (XANAX) tablet 0.5 mg  0.5 mg Oral DAILY PRN    clopidogreL (PLAVIX) tablet 75 mg  75 mg Oral DAILY    ezetimibe (ZETIA) tablet 10 mg  10 mg Oral DAILY    fenofibrate nanocrystallized (TRICOR) tablet 145 mg  145 mg Oral DAILY    pantoprazole (PROTONIX) tablet 40 mg  40 mg Oral ACB    rosuvastatin (CRESTOR) tablet 40 mg  40 mg Oral QHS    aspirin delayed-release tablet 162 mg  162 mg Oral DAILY       Allergies   Allergen Reactions    Erythromycin Anaphylaxis    Demerol [Meperidine] Other (comments)     hallucination    Sulfa (Sulfonamide Antibiotics) Other (comments)      Family History   Problem Relation Age of Onset    Elevated Lipids Father     Heart Disease Father 46        heart attack    Elevated Lipids Brother     Elevated Lipids Paternal Grandfather     Heart Disease Paternal Grandfather     Hypertension Paternal Grandfather     Stroke Paternal Grandfather       Social History     Socioeconomic History    Marital status: LEGALLY      Spouse name: Not on file    Number of children: Not on file    Years of education: Not on file    Highest education level: Not on file   Tobacco Use    Smoking status: Former Smoker     Packs/day: 2.00     Years: 30.00     Pack years: 60.00     Types: Cigarettes     Last attempt to quit: 2012     Years since quittin.2    Smokeless tobacco: Never Used   Substance and Sexual Activity    Alcohol use: No     Alcohol/week: 0.0 standard drinks    Drug use: No    Sexual activity: Yes     Partners: Male       Objective:    Physical Exam    Vitals:   Vitals:    20 0538 20 0545 20 0929 20 0947   BP: 95/72 (!) 89/75 (!) 89/75 (!) 97/55   Pulse: 60 69  60   Resp:    17   Temp:    97.6 °F (36.4 °C)   SpO2:    95%   Weight:   196 lb (88.9 kg)    Height:   5' 3\" (1.6 m)        General:    Alert, cooperative, no distress, appears stated age.    Neck: Supple, no carotid bruit and no JVD. Back:     Symmetric,    Lungs:     Clear to auscultation bilaterally. Heart[de-identified]    Regular rate and rhythm, S1, S2 normal, no murmur, click, rub or gallop. Abdomen:     Soft, non-tender. Bowel sounds normal. No masses,  No      organomegaly. Extremities:   Extremities normal, atraumatic, no cyanosis or edema. Vascular:   Pulses - 2+   Skin:   Skin color normal. No rashes or lesions   Neurologic:   LINDSAY, Normal strength throughout.         Telemetry: NSR    ECG:   EKG Results     Procedure 720 Value Units Date/Time    EKG, 12 LEAD, INITIAL [[id number]] Collected:  09/15/20 2016    Order Status:  Completed Updated:  09/16/20 0856     Ventricular Rate 82 BPM      Atrial Rate 82 BPM      P-R Interval 210 ms      QRS Duration 76 ms      Q-T Interval 376 ms      QTC Calculation (Bezet) 439 ms      Calculated P Axis 16 degrees      Calculated R Axis 0 degrees      Calculated T Axis 64 degrees      Diagnosis --     Sinus rhythm with 1st degree AV block  Nonspecific ST and T wave abnormality  When compared with ECG of 23-JUN-2020 20:53,  No significant change was found  Confirmed by Magalie Pitts M.D., Demarcus León ([id number]) on 9/16/2020 8:56:19 AM      EKG, 12 LEAD, INITIAL [[id number]] Collected:  09/16/20 0527    Order Status:  Completed Updated:  09/16/20 0846     Ventricular Rate 63 BPM      Atrial Rate 63 BPM      P-R Interval 250 ms      QRS Duration 84 ms      Q-T Interval 446 ms      QTC Calculation (Bezet) 456 ms      Calculated P Axis 44 degrees      Calculated R Axis 20 degrees      Calculated T Axis 58 degrees      Diagnosis --     Sinus rhythm with 1st degree AV block  When compared with ECG of 15-SEP-2020 20:16,  No significant change was found  Confirmed by Magalie Pitts M.D., Demarcus León ([id number]) on 9/16/2020 8:46:44 AM              Data Review:     Radiology:  XR Results (most recent):  Results from Hospital Encounter encounter on 09/15/20   XR CHEST PA LAT    Narrative Exam:  2 view chest    Indication: Chest pain    Comparison to 12/12/2019. PA and lateral views demonstrate normal heart size. Coronary artery stents are  noted. There is no acute process in the lung fields. The osseous structures are  unremarkable. Impression Impression: No acute process or change compared to the prior exam.           Recent Labs     09/16/20  0500 09/15/20  2018   TROIQ <0.05 <0.05     Recent Labs     09/16/20  0457 09/15/20  2018    142   K 4.0 4.0   * 105   CO2 24 30   BUN 14 15   CREA 0.87 1.16*   * 100   CA 9.4 9.3     Recent Labs     09/16/20  0457 09/15/20  2018   WBC 4.9 6.3   HGB 12.7 13.2   HCT 38.2 40.4    241     Recent Labs     09/16/20  0457 09/15/20  2018   PTP 11.1  --    INR 1.1  --    AP 79 74     Recent Labs     09/16/20  0500   CHOL 102   LDLC 43.6     No results for input(s): CRP, TSH, TSHEXT in the last 72 hours. No lab exists for component: ESR    Thank you very much for this referral. I appreciate the opportunity to participate in this patient's care. I will follow along with above stated plan. Evert Vega.  KALPESH Jacques         [facility] [address] [place] [address] [address]     [facility]     [phone]    Ling Clinton MD

## 2020-09-16 NOTE — ED TRIAGE NOTES
Triage: pt c/o chest pain radiating into left shoulder starting 1600 with nausea. Denies fever, cough, abd pain.

## 2021-03-01 ENCOUNTER — OFFICE VISIT (OUTPATIENT)
Dept: SURGERY | Age: 56
End: 2021-03-01
Payer: COMMERCIAL

## 2021-03-01 VITALS
WEIGHT: 183.1 LBS | SYSTOLIC BLOOD PRESSURE: 157 MMHG | HEIGHT: 63 IN | HEART RATE: 63 BPM | BODY MASS INDEX: 32.44 KG/M2 | OXYGEN SATURATION: 97 % | DIASTOLIC BLOOD PRESSURE: 62 MMHG | RESPIRATION RATE: 16 BRPM | TEMPERATURE: 97.5 F

## 2021-03-01 DIAGNOSIS — L02.91 ABSCESS: Primary | ICD-10-CM

## 2021-03-01 PROCEDURE — 99204 OFFICE O/P NEW MOD 45 MIN: CPT | Performed by: SURGERY

## 2021-03-01 RX ORDER — CEPHALEXIN 500 MG/1
500 CAPSULE ORAL 4 TIMES DAILY
Qty: 28 CAP | Refills: 0 | Status: SHIPPED | OUTPATIENT
Start: 2021-03-01 | End: 2021-03-08

## 2021-03-01 NOTE — PROGRESS NOTES
Identified pt with two pt identifiers(name and ). Reviewed record in preparation for visit and have obtained necessary documentation. All patient medications has been reviewed. Chief Complaint   Patient presents with    Cyst     seen at the request of ashley doctor three [facility] Maintenance Due   Topic    COVID-19 Vaccine (1 of 2)    Shingrix Vaccine Age 49> (1 of 2)    Colorectal Cancer Screening Combo     Breast Cancer Screen Mammogram     PAP AKA CERVICAL CYTOLOGY     Flu Vaccine (1)       Vitals:    21 1418   BP: (!) 157/62   Pulse: 63   Resp: 16   Temp: 97.5 °F (36.4 °C)   TempSrc: Temporal   SpO2: 97%   Weight: 83.1 kg (183 lb 1.6 oz)   Height: 5' 3\" (1.6 m)   PainSc:   0 - No pain       4. Have you been to the ER, urgent care clinic since your last visit? Hospitalized since your last visit? No    5. Have you seen or consulted any other health care providers outside of the [address] [place] [address] since your last visit? Include any pap smears or colon screening. No      Patient is accompanied by boyfriend I have received verbal consent from Terence Horta to discuss any/all medical information while they are present in the room.

## 2021-03-01 NOTE — Clinical Note
3/1/2021 Patient: Edouard Krishnamurthy YOB: 1965 Date of Visit: 3/1/2021 Sergio Carpenter MD 
[facility] [address] [facility] [address] [address] [address] Fax: [phone] Dear Sergio Carpenter MD, Thank you for referring Ms. Parisa Rodriguez to [facility] for evaluation. My notes for this consultation are attached. If you have questions, please do not hesitate to call me. I look forward to following your patient along with you. Sincerely, Kelsy Minor MD

## 2021-03-01 NOTE — PROGRESS NOTES
HISTORY OF PRESENT ILLNESS  Lorna Laws is a 50 y.o. female.     Had an incision and drainage of a abscess on her back that was done at [facility] on 2/15    She was treated with Bactrim and Keflex  Culture grew out staph    Cardiology: Dr Lara Bowman         ____________________________________________________________________________  Patient presents with:  Cyst: seen at the request of    BP (!) 157/62 (BP 1 Location: Right upper arm, BP Patient Position: Sitting, BP Cuff Size: Large adult)   Pulse 63   Temp 97.5 °F (36.4 °C) (Temporal)   Resp 16   Ht 5' 3\" (1.6 m)   Wt 83.1 kg (183 lb 1.6 oz)   SpO2 97%   BMI 32.43 kg/m²   Past Medical History:  No date: CAD (coronary artery disease)  No date: Gastrointestinal disorder      Comment:  hiatal hernia  No date: Hypertension  No date: Ill-defined condition      Comment:  vertigo  No date: MI (myocardial infarction) ([address]  No date: PVC (premature ventricular contraction)  No date: SVT (supraventricular tachycardia) ([facility])  Past Surgical History:  No date: HX  SECTION      Comment:  three  No date: HX TUBAL LIGATION  12: [place] CARDIAC SURG PROCEDURE UNLIST      Comment:  stents x 3  10/2013: [place] CARDIAC SURG PROCEDURE UNLIST      Comment:  stents x 3  Social History    Socioeconomic History      Marital status: SINGLE      Spouse name: Not on file      Number of children: Not on file      Years of education: Not on file      Highest education level: Not on file    Tobacco Use      Smoking status: Former Smoker        Packs/day: 2.00        Years: 30.00        Pack years: 61        Types: Cigarettes        Quit date: 2012        Years since quittin.7      Smokeless tobacco: Never Used    Substance and Sexual Activity      Alcohol use: No        Alcohol/week: 0.0 standard drinks      Drug use: No      Sexual activity: Yes        Partners: Male    Review of patient's family history indicates:  Problem: Elevated Lipids      Relation: Father Age of Onset: (Not Specified)  Problem: Heart Disease      Relation: Father          Age of Onset: 46          Comment: heart attack  Problem: Elevated Lipids      Relation: Brother          Age of Onset: (Not Specified)  Problem: Elevated Lipids      Relation: Paternal Grandfather          Age of Onset: (Not Specified)  Problem: Heart Disease      Relation: Paternal Grandfather          Age of Onset: (Not Specified)  Problem: Hypertension      Relation: Paternal Grandfather          Age of Onset: (Not Specified)  Problem: Stroke      Relation: Paternal Grandfather          Age of Onset: (Not Specified)    Current Outpatient Medications:    meclizine (ANTIVERT) 25 mg tablet, Take 25 mg by mouth three (3) times daily as needed for Dizziness.   diclofenac (VOLTAREN) 1 % gel, APPLY 1 GRAM TO AFFECTED AREA 3 TIMES A DAY AS NEEDED    KLOR-CON M20 20 mEq tablet, TAKE 1 TABLET BY MOUTH TWICE A DAY WITH FOOD    traMADol (ULTRAM) 50 mg tablet, TAKE 1 TABLET BY MOUTH EVERY 6 TO 8 HRS AS NEEDED FOR PAIN    metoprolol succinate (TOPROL-XL) 50 mg XL tablet, Take 50 mg by mouth daily.   ondansetron (ZOFRAN ODT) 8 mg disintegrating tablet, Take 1 Tab by mouth every eight (8) hours as needed for Nausea.   pantoprazole (PROTONIX) 40 mg tablet, Take 40 mg by mouth daily.   ALPRAZolam (XANAX) 1 mg tablet, Take 1 mg by mouth three (3) times daily as needed for Anxiety.   ezetimibe (ZETIA) 10 mg tablet, Take 10 mg by mouth daily.   aspirin delayed-release 81 mg tablet, TAKE 2 TABLETS BY MOUTH DAILY    SOTALOL HCL (SOTALOL PO), Take 60 mg by mouth two (2) times a day.   temazepam (RESTORIL) 30 mg capsule, Take 30 mg by mouth nightly as needed for Sleep.   rosuvastatin (CRESTOR) 40 mg tablet, Take 40 mg by mouth nightly.   fenofibrate (LOFIBRA) 160 mg tablet, Take 1 Tab by mouth daily.   lisinopril (PRINIVIL, ZESTRIL) 5 mg tablet, Take 1 Tab by mouth daily.     clopidogrel (PLAVIX) 75 mg tablet, Take 1 Tab by mouth daily.   OTHER,NON-FORMULARY,, Pt takes a \"GI Cocktail\" formulated by Dr. Lulu Keane    No current facility-administered medications for this visit. Allergies:  -- Erythromycin -- Anaphylaxis   -- Demerol (Meperidine) -- Other (comments)    --  hallucination   -- Sulfa (Sulfonamide Antibiotics) -- Other (comments)  _____________________________________________________________________________    Skin Problem  The history is provided by the patient. This is a chronic problem. The current episode started more than 1 week ago. The problem occurs every several days. The problem has been gradually worsening. Pertinent negatives include no chest pain, no abdominal pain, no headaches and no shortness of breath. The treatment provided no relief. Review of Systems   Constitutional: Negative for chills, fever and weight loss. HENT: Negative for ear pain. Eyes: Negative for pain. Respiratory: Negative for shortness of breath. Cardiovascular: Negative for chest pain. Gastrointestinal: Negative for abdominal pain and blood in stool. Genitourinary: Negative for hematuria. Musculoskeletal: Negative for joint pain. Skin: Negative for rash. Neurological: Negative for dizziness, focal weakness, seizures and headaches. Endo/Heme/Allergies: Does not bruise/bleed easily. Psychiatric/Behavioral: The patient does not have insomnia. Physical Exam  Vitals signs and nursing note reviewed. Constitutional:       General: She is not in acute distress. Appearance: Normal appearance. She is not ill-appearing, toxic-appearing or diaphoretic. HENT:      Head: Normocephalic and atraumatic. Right Ear: External ear normal.      Left Ear: External ear normal.      Nose: Nose normal.      Mouth/Throat:      Mouth: Mucous membranes are moist.   Eyes:      Extraocular Movements: Extraocular movements intact. Pupils: Pupils are equal, round, and reactive to light.    Neck:      Musculoskeletal: Normal range of motion. Pulmonary:      Effort: Pulmonary effort is normal.      Breath sounds: No stridor. No wheezing. Skin:     Comments: 3 cm abscess of the back. Minimal fluctuance minimal erythema. Neurological:      General: No focal deficit present. Mental Status: She is alert and oriented to person, place, and time. Psychiatric:         Mood and Affect: Mood normal.         Behavior: Behavior normal.         Thought Content: Thought content normal.         Judgment: Judgment normal.         ASSESSMENT and PLAN    ICD-10-CM ICD-9-CM    1. Abscess  L02.91 682. 9       Abscess site still inflamed. Appears to be an infected sebaceous cyst.   Not ready for excision today  Offered to set her up in the office for excision. She will not do it without anesthesia. I had an extensive discussion with Temple Rafael and her boyfriend regarding the risks, benefits, and alternatives of proceeding with a sebaceous cyst excision. Risks of surgery including the risk of anesthesia, bleeding, infection, injury to underlying structures, recurrence, and the lack of symptomatic improvement were discussed and he is in agreement to proceed. I reviewed with Marychuy Hall her increased risk of bleeding secondary to Plavix and asprin use. I have asked her to discontinue the Plavix for 5 days prior to surgery to reduce this risk. Okay to continue aspirin. I will ask for a risk assessment from cardiology and we will proceed with surgery provided she is an acceptable surgical risk. They expressed understanding of our discussion. And are agreeable with the plan.

## 2021-04-03 ENCOUNTER — HOSPITAL ENCOUNTER (EMERGENCY)
Age: 56
Discharge: HOME | End: 2021-04-03
Attending: EMERGENCY MEDICINE
Payer: COMMERCIAL

## 2021-04-03 VITALS
RESPIRATION RATE: 14 BRPM | BODY MASS INDEX: 33.44 KG/M2 | HEART RATE: 70 BPM | SYSTOLIC BLOOD PRESSURE: 136 MMHG | HEIGHT: 63 IN | DIASTOLIC BLOOD PRESSURE: 70 MMHG | OXYGEN SATURATION: 100 % | WEIGHT: 188.71 LBS | TEMPERATURE: 98 F

## 2021-04-03 DIAGNOSIS — H92.01 RIGHT EAR PAIN: Primary | ICD-10-CM

## 2021-04-03 PROCEDURE — 99282 EMERGENCY DEPT VISIT SF MDM: CPT

## 2021-04-03 RX ORDER — MUPIROCIN 20 MG/G
OINTMENT TOPICAL DAILY
Qty: 22 G | Refills: 0 | Status: SHIPPED | OUTPATIENT
Start: 2021-04-03

## 2021-04-03 NOTE — ED PROVIDER NOTES
The history is provided by the patient. Other  This is a new problem. The current episode started more than 2 days ago. The problem occurs constantly. The problem has not changed since onset. Pertinent negatives include no chest pain, no abdominal pain, no headaches and no shortness of breath. Nothing aggravates the symptoms. Nothing relieves the symptoms. She has tried nothing for the symptoms. The treatment provided no relief.         Past Medical History:   Diagnosis Date    CAD (coronary artery disease)     Gastrointestinal disorder     hiatal hernia    Hypertension     Ill-defined condition     vertigo    [facility]     PVC (premature ventricular contraction)     [facility]cardia[facility] ([facility])        Past Surgical History:   Procedure Laterality Date    HX  SECTION      three    HX TUBAL LIGATION      [place] CARDIAC SURG PROCEDURE UNLIST  12    stents x 3    [place] CARDIAC SURG PROCEDURE UNLIST  10/2013    stents x 3         Family History:   Problem Relation Age of Onset    Elevated Lipids Father     Heart Disease Father 46        heart attack    Elevated Lipids Brother     Elevated Lipids Paternal Grandfather     Heart Disease Paternal Grandfather     Hypertension Paternal Grandfather     Stroke Paternal Grandfather        Social History     Socioeconomic History    Marital status: SINGLE     Spouse name: Not on file    Number of children: Not on file    Years of education: Not on file    Highest education level: Not on file   Occupational History    Not on file   Social Needs    Financial resource strain: Not on file    Food insecurity     Worry: Not on file     Inability: Not on file    Transportation needs     Medical: Not on file     Non-medical: Not on file   Tobacco Use    Smoking status: Former Smoker     Packs/day: 2.00     Years: 30.00     Pack years: 60.00     Types: Cigarettes     Quit date: 2012     Years since quittin.8    Smokeless tobacco: Never Used   Substance and Sexual Activity    Alcohol use: No     Alcohol/week: 0.0 standard drinks    Drug use: No    Sexual activity: Yes     Partners: Male   Lifestyle    Physical activity     Days per week: Not on file     Minutes per session: Not on file    Stress: Not on file   Relationships    Social connections     Talks on phone: Not on file     Gets together: Not on file     Attends Mandaen service: Not on file     Active member of club or organization: Not on file     Attends meetings of clubs or organizations: Not on file     Relationship status: Not on file    Intimate partner violence     Fear of current or ex partner: Not on file     Emotionally abused: Not on file     Physically abused: Not on file     Forced sexual activity: Not on file   Other Topics Concern    Not on file   Social History Narrative    Not on file         ALLERGIES: Erythromycin and Demerol [meperidine]    Review of Systems   Constitutional: Negative for activity change, chills and fever. HENT: Positive for ear pain. Negative for nosebleeds, sore throat, trouble swallowing and voice change. Eyes: Negative for visual disturbance. Respiratory: Negative for shortness of breath. Cardiovascular: Negative for chest pain and palpitations. Gastrointestinal: Negative for abdominal pain, constipation, diarrhea and nausea. Genitourinary: Negative for difficulty urinating, dysuria, hematuria and urgency. Musculoskeletal: Negative for back pain, neck pain and neck stiffness. Skin: Negative for color change. Allergic/Immunologic: Negative for immunocompromised state. Neurological: Negative for dizziness, seizures, syncope, weakness, light-headedness, numbness and headaches. Psychiatric/Behavioral: Negative for behavioral problems, confusion, hallucinations, self-injury and suicidal ideas.        Vitals:    04/03/21 1810   BP: 136/70   Pulse: 70   Resp: 14   Temp: 98 °F (36.7 °C)   SpO2: 100% Weight: 85.6 kg (188 lb 11.4 oz)   Height: 5' 3\" (1.6 m)            Physical Exam  Vitals signs and nursing note reviewed. Constitutional:       General: She is not in acute distress. Appearance: She is well-developed. She is not diaphoretic. HENT:      Head: Atraumatic. Ears:     Neck:      Trachea: No tracheal deviation. Cardiovascular:      Comments: Warm and well perfused  Pulmonary:      Effort: Pulmonary effort is normal. No respiratory distress. Musculoskeletal: Normal range of motion. Skin:     General: Skin is warm and dry. Neurological:      Mental Status: She is alert. Coordination: Coordination normal.   Psychiatric:         Behavior: Behavior normal.         Thought Content: Thought content normal.         Judgment: Judgment normal.          MDM     This is a 53-year-old female with past medical history, review of systems, physical exam as above, presenting with complaints of right ear pain. Patient states tender erythematous lesion has grown over the side of a former cartilage piercing over the last 4 to 5 days. She denies injury, states she fully removed the earring prior to the development of the growth. She denies systemic symptoms. Physical exam is remarkable for a well-appearing middle-aged female, no acute distress with what appears to be a small growth over the posterior right auricular cartilage, mildly tender with blister on the end. There is no surrounding erythema or fluctuance. I offered the patient I&D, however she seems reluctant to undergo procedure, suggested alternative therapy might be topical mupirocin. Will provide prescription, recommend primary care follow-up, return precautions given.     Procedures

## 2021-04-03 NOTE — ED TRIAGE NOTES
Pt states she has bump on the back of her right ear where she has an ear piercing. Pt states she took the earring out and there is a bump on the back of her ear now, and her mother told her it appeared infected.  Denies fever

## 2021-04-03 NOTE — ED NOTES
The patient was discharged home by Dr Suzanne Terry in stable condition. The patient is alert and oriented, in no respiratory distress and discharge vital signs obtained. The patient's diagnosis, condition and treatment were explained. The patient expressed understanding. 1 prescriptions given. A discharge plan has been developed. Aftercare instructions were given. Pt ambulatory out of the ED.

## 2021-05-07 ENCOUNTER — TRANSCRIBE ORDER (OUTPATIENT)
Dept: GENERAL RADIOLOGY | Age: 56
End: 2021-05-07

## 2021-05-07 ENCOUNTER — TRANSCRIBE ORDER (OUTPATIENT)
Dept: EMERGENCY DEPT | Age: 56
End: 2021-05-07

## 2021-05-07 DIAGNOSIS — I47.20 VENTRICULAR TACHYCARDIA: ICD-10-CM

## 2021-05-07 DIAGNOSIS — R00.2 PALPITATIONS: ICD-10-CM

## 2021-05-07 DIAGNOSIS — E78.49 OTHER HYPERLIPIDEMIA: ICD-10-CM

## 2021-05-07 DIAGNOSIS — I25.10 ATHEROSCLEROTIC HEART DISEASE: Primary | ICD-10-CM

## 2021-05-17 ENCOUNTER — HOSPITAL ENCOUNTER (OUTPATIENT)
Dept: CT IMAGING | Age: 56
Discharge: HOME | End: 2021-05-17
Payer: COMMERCIAL

## 2021-05-17 VITALS — DIASTOLIC BLOOD PRESSURE: 60 MMHG | HEART RATE: 58 BPM | SYSTOLIC BLOOD PRESSURE: 104 MMHG

## 2021-05-17 DIAGNOSIS — R00.2 PALPITATIONS: ICD-10-CM

## 2021-05-17 DIAGNOSIS — E78.49 OTHER HYPERLIPIDEMIA: ICD-10-CM

## 2021-05-17 DIAGNOSIS — I47.20 VENTRICULAR TACHYCARDIA: ICD-10-CM

## 2021-05-17 DIAGNOSIS — I25.10 ATHEROSCLEROTIC HEART DISEASE: ICD-10-CM

## 2021-05-17 PROCEDURE — 74011000636 HC RX REV CODE- 636: Performed by: RADIOLOGY

## 2021-05-17 PROCEDURE — 75574 CT ANGIO HRT W/3D IMAGE: CPT

## 2021-05-17 RX ORDER — IODIXANOL 320 MG/ML
100 INJECTION, SOLUTION INTRAVASCULAR ONCE
Status: COMPLETED | OUTPATIENT
Start: 2021-05-17 | End: 2021-05-17

## 2021-05-17 RX ORDER — NITROGLYCERIN 0.4 MG/1
0.4 TABLET SUBLINGUAL AS NEEDED
Status: DISCONTINUED | OUTPATIENT
Start: 2021-05-17 | End: 2021-05-17

## 2021-05-17 RX ADMIN — IODIXANOL 100 ML: 320 INJECTION, SOLUTION INTRAVASCULAR at 14:36

## 2021-05-17 NOTE — PROGRESS NOTES
1415- Pt to CT room. IV SL established by TREY Cifuentes RT. Pt tolerated well. Pt took beta blocker PTA. HR-62    1425- CT scan begins. 1431- NTG not given due to low bp. IV patent. No complaints. HR-64    1435- CT scan complete. Pt tolerated procedure well. 1445- IV D/C'd intact without problem. D/C instructions reviewed with pt and copy given. Verbalized understanding. D/C'd amb, stable, NAD.

## 2021-05-24 ENCOUNTER — TELEPHONE (OUTPATIENT)
Dept: HEMATOLOGY | Age: 56
End: 2021-05-24

## 2021-05-24 NOTE — TELEPHONE ENCOUNTER
----- Message from Lindsey White sent at 5/21/2021 10:45 AM EDT -----  Regarding: Dr. Jelly Gibbs  Pt is requesting a call and would like to know if she will need a follow up appt. Pt has not been seen in a while. Best contact number [phone].

## 2021-05-24 NOTE — TELEPHONE ENCOUNTER
Returned phone call to patient. L/m to advise patient that follow up is needed per the last visit note.

## 2021-06-03 ENCOUNTER — TRANSCRIBE ORDER (OUTPATIENT)
Dept: GENERAL RADIOLOGY | Age: 56
End: 2021-06-03

## 2021-06-03 DIAGNOSIS — R00.2 PALPITATIONS: ICD-10-CM

## 2021-06-03 DIAGNOSIS — E78.49 OTHER HYPERLIPIDEMIA: ICD-10-CM

## 2021-06-03 DIAGNOSIS — I47.20 VENTRICULAR TACHYCARDIA: ICD-10-CM

## 2021-06-03 DIAGNOSIS — I25.10 ATHEROSCLEROTIC HEART DISEASE: Primary | ICD-10-CM

## 2021-06-28 ENCOUNTER — HOSPITAL ENCOUNTER (OUTPATIENT)
Dept: CT IMAGING | Age: 56
Discharge: HOME | End: 2021-06-28
Attending: INTERNAL MEDICINE
Payer: COMMERCIAL

## 2021-06-28 VITALS — SYSTOLIC BLOOD PRESSURE: 123 MMHG | DIASTOLIC BLOOD PRESSURE: 66 MMHG | HEART RATE: 58 BPM

## 2021-06-28 DIAGNOSIS — I25.10 ATHEROSCLEROTIC HEART DISEASE: ICD-10-CM

## 2021-06-28 DIAGNOSIS — I47.20 VENTRICULAR TACHYCARDIA: ICD-10-CM

## 2021-06-28 DIAGNOSIS — E78.49 OTHER HYPERLIPIDEMIA: ICD-10-CM

## 2021-06-28 DIAGNOSIS — R00.2 PALPITATIONS: ICD-10-CM

## 2021-06-28 PROCEDURE — 74011250637 HC RX REV CODE- 250/637

## 2021-06-28 PROCEDURE — 75574 CT ANGIO HRT W/3D IMAGE: CPT

## 2021-06-28 PROCEDURE — 74011000636 HC RX REV CODE- 636

## 2021-06-28 PROCEDURE — 74011000636 HC RX REV CODE- 636: Performed by: INTERNAL MEDICINE

## 2021-06-28 RX ORDER — IODIXANOL 320 MG/ML
100 INJECTION, SOLUTION INTRAVASCULAR ONCE
Status: COMPLETED | OUTPATIENT
Start: 2021-06-28 | End: 2021-06-28

## 2021-06-28 RX ORDER — NITROGLYCERIN 0.4 MG/1
0.4 TABLET SUBLINGUAL AS NEEDED
Status: DISCONTINUED | OUTPATIENT
Start: 2021-06-28 | End: 2021-07-02 | Stop reason: HOSPADM

## 2021-06-28 RX ORDER — NITROGLYCERIN 0.4 MG/1
TABLET SUBLINGUAL
Status: COMPLETED
Start: 2021-06-28 | End: 2021-06-28

## 2021-06-28 RX ADMIN — IODIXANOL 100 ML: 320 INJECTION, SOLUTION INTRAVASCULAR at 08:25

## 2021-06-28 RX ADMIN — NITROGLYCERIN 0.4 MG: 0.4 TABLET, ORALLY DISINTEGRATING SUBLINGUAL at 08:18

## 2021-06-28 RX ADMIN — NITROGLYCERIN 0.4 MG: 0.4 TABLET SUBLINGUAL at 08:18

## 2021-06-28 NOTE — PROGRESS NOTES
0800- Pt to CT room for repeat CCTA per request of Dr. Darci Ryder. Study to be performed at no charge per Bambi Pizano, RT. IV SL established by Kumar Esteves RN x 1 attempt. Pt tolerated well. Pt took additional beta blocker PTA. HR-62    2828- CT scan begins. 0818- NTG given. IV patent. No complaints. HR-64    0825- CT scan complete. Pt tolerated procedure well. HR remained steady in 50's during scanning. Pt c/o 2 \"twinges\" of chest pain during scan. Denies radiation, SOB, nausea, or other s/s. Pt states completely resolved at this time. Offered to transport pt down the gandhi to the ER for evaluation. Pt declined. States she feels ok now and will seek treatment later if she has any issues. 0840- IV D/C'd intact without problem. D/C instructions reviewed with pt and copy given. Verbalized understanding. Encouraged to monitor self for further CP episodes or cardiac symptoms and seek immediate treatment if needed. Pt said she would. D/C'd amb, stable, NAD.

## 2021-07-04 NOTE — PROCEDURES
[facility]  PROCEDURE NOTE    Name:  Jorge Naranjo  MR#:  [id number]  :  1965  ACCOUNT #:  [de-identified]  DATE OF SERVICE:  2021    PREOPERATIVE DIAGNOSES:  Chest pain with known coronary artery disease. POSTOPERATIVE DIAGNOSIS:  No significant coronary artery disease. PROCEDURE PERFORMED:  Coronary CTA. SURGEON:  Josie Knutson MD    ASSISTANT: none    ANESTHESIA:  none    ESTIMATED BLOOD LOSS: 0 cc    SPECIMENS REMOVED:  none. COMPLICATIONS:  none    IMPLANTS: none. INDICATION:  Chest pain in a patient with known coronary artery disease. RESULTS:  The extracardiac portion of the study will be dictated by Radiology Services. CORONARY ANATOMY:  1. The left main coronary artery arises normally from the sinus of Valsalva. There is no significant atherosclerotic disease seen in the left main. 2.  The LAD is a moderate-sized vessel, which extends around the apex. There appears to be a stent in the proximal mid LAD. The LAD is intact without significant atherosclerotic disease. The stent appears intact without significant restenosis. 3.  The circumflex is a moderate-sized nondominant vessel, which gives off a moderate-sized posterolateral branch. There is a stent in the mid circumflex. The circumflex appears intact without significant atherosclerotic disease. The stent is well visualized and appears intact without significant restenosis within the stent. 4.  The right coronary artery is a small-to-moderate sized dominant vessel, which gives off a small posterolateral branch and small sized PDA branch, appears to be a stent in proximal and distal right coronary artery. Right coronary artery has no significant atherosclerotic disease. The stents in the right coronary artery appear intact without significant restenosis. LEFT VENTRICULAR ANALYSIS:  The left ventricle is normal in size.   The left ventricular myocardium is normal.  There were no regional wall motion abnormalities noted. The left ventricular ejection fraction is calculated to be 68%. CONCLUSION:  1. Right dominant coronary artery system. 2.  No significant atherosclerotic disease seen with intact stents. 3.  Normal left ventricular analysis with ejection fraction of 68%.       Kimberly Navarro MD      [id number]  D:  2021 10:47  T:  2021 16:47  2021 13:04  JOB #:  [id number]  CC:  Ozzy Shaikh MD

## 2021-08-19 ENCOUNTER — HOSPITAL ENCOUNTER (OUTPATIENT)
Age: 56
Setting detail: OBSERVATION
Discharge: HOME | End: 2021-08-20
Attending: EMERGENCY MEDICINE | Admitting: INTERNAL MEDICINE
Payer: COMMERCIAL

## 2021-08-19 DIAGNOSIS — I25.10 CORONARY ARTERY DISEASE INVOLVING NATIVE CORONARY ARTERY OF NATIVE HEART, UNSPECIFIED WHETHER ANGINA PRESENT: ICD-10-CM

## 2021-08-19 DIAGNOSIS — R00.1 BRADYCARDIA: ICD-10-CM

## 2021-08-19 DIAGNOSIS — Z95.5 S/P CORONARY ARTERY STENT PLACEMENT: ICD-10-CM

## 2021-08-19 DIAGNOSIS — I20.0 UNSTABLE ANGINA PECTORIS (HCC): Primary | ICD-10-CM

## 2021-08-19 PROBLEM — R07.9 CHEST PAIN: Status: ACTIVE | Noted: 2021-08-19

## 2021-08-19 LAB
ALBUMIN SERPL-MCNC: 4.2 G/DL (ref 3.5–5)
ALBUMIN/GLOB SERPL: 1.2 {RATIO} (ref 1.1–2.2)
ALP SERPL-CCNC: 58 U/L (ref 45–117)
ALT SERPL-CCNC: 31 U/L (ref 12–78)
ANION GAP SERPL CALC-SCNC: 11 MMOL/L (ref 5–15)
AST SERPL-CCNC: 24 U/L (ref 15–37)
BASOPHILS # BLD: 0.1 K/UL (ref 0–0.1)
BASOPHILS NFR BLD: 1 % (ref 0–1)
BILIRUB SERPL-MCNC: 0.5 MG/DL (ref 0.2–1)
BUN SERPL-MCNC: 10 MG/DL (ref 6–20)
BUN/CREAT SERPL: 13 (ref 12–20)
CALCIUM SERPL-MCNC: 10 MG/DL (ref 8.5–10.1)
CHLORIDE SERPL-SCNC: 105 MMOL/L (ref 97–108)
CO2 SERPL-SCNC: 28 MMOL/L (ref 21–32)
CREAT SERPL-MCNC: 0.8 MG/DL (ref 0.55–1.02)
DIFFERENTIAL METHOD BLD: NORMAL
EOSINOPHIL # BLD: 0.2 K/UL (ref 0–0.4)
EOSINOPHIL NFR BLD: 3 % (ref 0–7)
ERYTHROCYTE [DISTWIDTH] IN BLOOD BY AUTOMATED COUNT: 13.2 % (ref 11.5–14.5)
GLOBULIN SER CALC-MCNC: 3.4 G/DL (ref 2–4)
GLUCOSE SERPL-MCNC: 93 MG/DL (ref 65–100)
HCT VFR BLD AUTO: 38.4 % (ref 35–47)
HGB BLD-MCNC: 13 G/DL (ref 11.5–16)
IMM GRANULOCYTES # BLD AUTO: 0 K/UL (ref 0–0.04)
IMM GRANULOCYTES NFR BLD AUTO: 0 % (ref 0–0.5)
LYMPHOCYTES # BLD: 2.2 K/UL (ref 0.8–3.5)
LYMPHOCYTES NFR BLD: 37 % (ref 12–49)
MCH RBC QN AUTO: 31.2 PG (ref 26–34)
MCHC RBC AUTO-ENTMCNC: 33.9 G/DL (ref 30–36.5)
MCV RBC AUTO: 92.1 FL (ref 80–99)
MONOCYTES # BLD: 0.5 K/UL (ref 0–1)
MONOCYTES NFR BLD: 8 % (ref 5–13)
NEUTS SEG # BLD: 3 K/UL (ref 1.8–8)
NEUTS SEG NFR BLD: 51 % (ref 32–75)
NRBC # BLD: 0 K/UL (ref 0–0.01)
NRBC BLD-RTO: 0 PER 100 WBC
PLATELET # BLD AUTO: 244 K/UL (ref 150–400)
PMV BLD AUTO: 11 FL (ref 8.9–12.9)
POTASSIUM SERPL-SCNC: 4.3 MMOL/L (ref 3.5–5.1)
PROT SERPL-MCNC: 7.6 G/DL (ref 6.4–8.2)
RBC # BLD AUTO: 4.17 M/UL (ref 3.8–5.2)
SODIUM SERPL-SCNC: 144 MMOL/L (ref 136–145)
TROPONIN I SERPL-MCNC: <0.05 NG/ML
WBC # BLD AUTO: 6 K/UL (ref 3.6–11)

## 2021-08-19 PROCEDURE — 99285 EMERGENCY DEPT VISIT HI MDM: CPT

## 2021-08-19 PROCEDURE — 80053 COMPREHEN METABOLIC PANEL: CPT

## 2021-08-19 PROCEDURE — 65270000032 HC RM SEMIPRIVATE

## 2021-08-19 PROCEDURE — 99218 HC RM OBSERVATION: CPT

## 2021-08-19 PROCEDURE — 84484 ASSAY OF TROPONIN QUANT: CPT

## 2021-08-19 PROCEDURE — 85025 COMPLETE CBC W/AUTO DIFF WBC: CPT

## 2021-08-19 PROCEDURE — 36415 COLL VENOUS BLD VENIPUNCTURE: CPT

## 2021-08-19 PROCEDURE — 93005 ELECTROCARDIOGRAM TRACING: CPT

## 2021-08-19 RX ORDER — MORPHINE SULFATE 2 MG/ML
2 INJECTION, SOLUTION INTRAMUSCULAR; INTRAVENOUS
Status: DISCONTINUED | OUTPATIENT
Start: 2021-08-19 | End: 2021-08-20 | Stop reason: HOSPADM

## 2021-08-19 RX ORDER — NITROGLYCERIN 0.4 MG/1
0.4 TABLET SUBLINGUAL
Status: DISCONTINUED | OUTPATIENT
Start: 2021-08-19 | End: 2021-08-20 | Stop reason: HOSPADM

## 2021-08-19 NOTE — ED PROVIDER NOTES
Linsey Restrepo is a 48 yo F with h/o CAD and MI who presents to the ED with intermittent chest pain with nasuea and diaphoresis for the past 2 days. She states that they are occurring on and off, last around 5pm.  She has taken nitroglycerin and aspirin which has helped.             Past Medical History:   Diagnosis Date    CAD (coronary artery disease)     Gastrointestinal disorder     hiatal hernia    Hypertension     Ill-defined condition     vertigo    MI (myocardial infarction) ([address]     PVC (premature ventricular contraction)     SVT (supraventricular tachycardia) ([facility])        Past Surgical History:   Procedure Laterality Date    HX  SECTION      three    HX TUBAL LIGATION      [place] CARDIAC SURG PROCEDURE UNLIST  12    stents x 3    [place] CARDIAC SURG PROCEDURE UNLIST  10/2013    stents x 3         Family History:   Problem Relation Age of Onset    Elevated Lipids Father     Heart Disease Father 46        heart attack    Elevated Lipids Brother     Elevated Lipids Paternal Grandfather     Heart Disease Paternal Grandfather     Hypertension Paternal Grandfather     Stroke Paternal Grandfather        Social History     Socioeconomic History    Marital status: SINGLE     Spouse name: Not on file    Number of children: Not on file    Years of education: Not on file    Highest education level: Not on file   Occupational History    Not on file   Tobacco Use    Smoking status: Former Smoker     Packs/day: 2.00     Years: 30.00     Pack years: 60.00     Types: Cigarettes     Quit date: 2012     Years since quittin.2    Smokeless tobacco: Never Used   Substance and Sexual Activity    Alcohol use: No     Alcohol/week: 0.0 standard drinks    Drug use: No    Sexual activity: Yes     Partners: Male   Other Topics Concern    Not on file   Social History Narrative    Not on file     Social Determinants of Health     Financial Resource Strain:     Difficulty of Paying Living Expenses:    Food Insecurity:     Worried About Running Out of Food in the Last Year:     [address] in the Last Year:    Transportation Needs:     Lack of Transportation (Medical):  Lack of Transportation (Non-Medical):    Physical Activity:     Days of Exercise per Week:     Minutes of Exercise per Session:    Stress:     Feeling of Stress :    Social Connections:     Frequency of Communication with Friends and Family:     Frequency of Social Gatherings with Friends and Family:     Attends Pentecost[employer] Services:     Active Member of Clubs or Organizations:     Attends Club or Organization Meetings:     Marital Status:    Intimate Partner Violence:     Fear of Current or Ex-Partner:     Emotionally Abused:     Physically Abused:     Sexually Abused: ALLERGIES: Erythromycin and Demerol [meperidine]    Review of Systems   Constitutional: Positive for diaphoresis. Negative for fever. HENT: Negative for sore throat. Eyes: Negative for visual disturbance. Respiratory: Negative for cough. Cardiovascular: Positive for chest pain. Gastrointestinal: Positive for nausea. Negative for abdominal pain. Genitourinary: Negative for dysuria. Musculoskeletal: Negative for back pain. Skin: Negative for rash. Neurological: Negative for headaches. Vitals:    08/19/21 1900 08/19/21 1902 08/19/21 1930   BP: 120/68 (!) 116/91 119/62   Pulse: (!) 59 61 (!) 56   Resp: 12 16 18   Temp:  98.6 °F (37 °C)    SpO2: 98% 98% 97%   Weight:  81.6 kg (180 lb)    Height:  5' 3\" (1.6 m)             Physical Exam  Vitals and nursing note reviewed. Constitutional:       General: She is not in acute distress. Appearance: She is well-developed. HENT:      Head: Normocephalic and atraumatic. Eyes:      Conjunctiva/sclera: Conjunctivae normal.   Neck:      Trachea: Phonation normal.   Cardiovascular:      Rate and Rhythm: Normal rate. Heart sounds: No murmur heard. No friction rub. Pulmonary:      Effort: Pulmonary effort is normal. No respiratory distress. Breath sounds: No wheezing or rales. Abdominal:      General: There is no distension. Musculoskeletal:         General: No tenderness. Normal range of motion. Cervical back: Normal range of motion. Skin:     General: Skin is warm and dry. Neurological:      Mental Status: She is alert. She is not disoriented. Motor: No abnormal muscle tone. ED EKG interpretation:  Rhythm: normal sinus rhythm with first degree AV block; and regular . Rate (approx.): 62; Axis: normal; P wave: prolonged; QRS interval: normal ; ST/T wave: normal; Other findings: borderline ekg. This EKG was interpreted by Ciara Miguel MD,ED Provider. MDM       8:39 PM  PAtient reassessed and is chest pain free but had one episode with chest pain, nausea and bradycardia into the upper 40's that resolved spontaneously. CBC and CMP normal.     Perfect Serve Consult for Admission  8:41 PM    ED Room Number: SER04/04  Patient Name and age: Arturo Almanzar 49 y.o.  female  Working Diagnosis:   1. Unstable angina pectoris ([address]    2. Bradycardia        COVID-19 Suspicion:  no  Sepsis present:  no  Reassessment needed: N/A  Code Status:  Full Code  Readmission: no  Isolation Requirements:  no  Recommended Level of Care:  telemetry  Department:[facility] - [phone]  Other:  PAtient with h/o ACS and multiple stents. Has had intermittent chest pain for 2 days that relieved with NTG. Becomes nauseated and diaphoretic with episodes. Had one in the ED and had bradycardia to upper 40s with the pain but all symptoms resolved spontaneously prior to any interventions given.   EKG with NSR and 1st degree block with no ST changes and initial trop normal.   Procedures

## 2021-08-19 NOTE — ED TRIAGE NOTES
Triage note:2 days of intermittent left pain pressure stated it feels like her normal angina chest pain took her nitro at home. hx of angina.

## 2021-08-20 ENCOUNTER — APPOINTMENT (OUTPATIENT)
Dept: NON INVASIVE DIAGNOSTICS | Age: 56
End: 2021-08-20
Attending: NURSE PRACTITIONER
Payer: COMMERCIAL

## 2021-08-20 ENCOUNTER — APPOINTMENT (OUTPATIENT)
Dept: GENERAL RADIOLOGY | Age: 56
End: 2021-08-20
Attending: FAMILY MEDICINE
Payer: COMMERCIAL

## 2021-08-20 ENCOUNTER — APPOINTMENT (OUTPATIENT)
Dept: NUCLEAR MEDICINE | Age: 56
End: 2021-08-20
Attending: NURSE PRACTITIONER
Payer: COMMERCIAL

## 2021-08-20 ENCOUNTER — TELEPHONE (OUTPATIENT)
Dept: CARDIOLOGY CLINIC | Age: 56
End: 2021-08-20

## 2021-08-20 VITALS
HEIGHT: 63 IN | DIASTOLIC BLOOD PRESSURE: 64 MMHG | TEMPERATURE: 97.9 F | RESPIRATION RATE: 16 BRPM | OXYGEN SATURATION: 98 % | HEART RATE: 78 BPM | WEIGHT: 180 LBS | SYSTOLIC BLOOD PRESSURE: 103 MMHG | BODY MASS INDEX: 31.89 KG/M2

## 2021-08-20 LAB
ANION GAP SERPL CALC-SCNC: 5 MMOL/L (ref 5–15)
ATRIAL RATE: 62 BPM
BUN SERPL-MCNC: 14 MG/DL (ref 6–20)
BUN/CREAT SERPL: 20 (ref 12–20)
CALCIUM SERPL-MCNC: 9.3 MG/DL (ref 8.5–10.1)
CALCULATED P AXIS, ECG09: 17 DEGREES
CALCULATED R AXIS, ECG10: 4 DEGREES
CALCULATED T AXIS, ECG11: 47 DEGREES
CHLORIDE SERPL-SCNC: 111 MMOL/L (ref 97–108)
CO2 SERPL-SCNC: 27 MMOL/L (ref 21–32)
CREAT SERPL-MCNC: 0.7 MG/DL (ref 0.55–1.02)
DIAGNOSIS, 93000: NORMAL
ERYTHROCYTE [DISTWIDTH] IN BLOOD BY AUTOMATED COUNT: 13.2 % (ref 11.5–14.5)
GLUCOSE SERPL-MCNC: 88 MG/DL (ref 65–100)
HCT VFR BLD AUTO: 36.5 % (ref 35–47)
HGB BLD-MCNC: 12.2 G/DL (ref 11.5–16)
MAGNESIUM SERPL-MCNC: 2.3 MG/DL (ref 1.6–2.4)
MCH RBC QN AUTO: 31.1 PG (ref 26–34)
MCHC RBC AUTO-ENTMCNC: 33.4 G/DL (ref 30–36.5)
MCV RBC AUTO: 93.1 FL (ref 80–99)
NRBC # BLD: 0 K/UL (ref 0–0.01)
NRBC BLD-RTO: 0 PER 100 WBC
P-R INTERVAL, ECG05: 210 MS
PLATELET # BLD AUTO: 199 K/UL (ref 150–400)
PMV BLD AUTO: 10.9 FL (ref 8.9–12.9)
POTASSIUM SERPL-SCNC: 3.7 MMOL/L (ref 3.5–5.1)
Q-T INTERVAL, ECG07: 422 MS
QRS DURATION, ECG06: 82 MS
QTC CALCULATION (BEZET), ECG08: 428 MS
RBC # BLD AUTO: 3.92 M/UL (ref 3.8–5.2)
SODIUM SERPL-SCNC: 143 MMOL/L (ref 136–145)
STRESS BASELINE HR: 51 BPM
STRESS ESTIMATED WORKLOAD: 1 METS
STRESS EXERCISE DUR MIN: NORMAL
STRESS PEAK DIAS BP: 66 MMHG
STRESS PEAK SYS BP: 109 MMHG
STRESS PERCENT HR ACHIEVED: 76 %
STRESS POST PEAK HR: 126 BPM
STRESS RATE PRESSURE PRODUCT: NORMAL BPM*MMHG
STRESS ST DEPRESSION: 0 MM
STRESS ST ELEVATION: 0 MM
STRESS TARGET HR: 166 BPM
TROPONIN I SERPL-MCNC: <0.05 NG/ML
VENTRICULAR RATE, ECG03: 62 BPM
WBC # BLD AUTO: 5.1 K/UL (ref 3.6–11)

## 2021-08-20 PROCEDURE — 80048 BASIC METABOLIC PNL TOTAL CA: CPT

## 2021-08-20 PROCEDURE — 84484 ASSAY OF TROPONIN QUANT: CPT

## 2021-08-20 PROCEDURE — 78452 HT MUSCLE IMAGE SPECT MULT: CPT

## 2021-08-20 PROCEDURE — 99218 HC RM OBSERVATION: CPT

## 2021-08-20 PROCEDURE — 85027 COMPLETE CBC AUTOMATED: CPT

## 2021-08-20 PROCEDURE — 74011250637 HC RX REV CODE- 250/637: Performed by: FAMILY MEDICINE

## 2021-08-20 PROCEDURE — 71046 X-RAY EXAM CHEST 2 VIEWS: CPT

## 2021-08-20 PROCEDURE — 83735 ASSAY OF MAGNESIUM: CPT

## 2021-08-20 PROCEDURE — 74011250637 HC RX REV CODE- 250/637: Performed by: NURSE PRACTITIONER

## 2021-08-20 PROCEDURE — 99220 PR INITIAL OBSERVATION CARE/DAY 70 MINUTES: CPT | Performed by: SPECIALIST

## 2021-08-20 PROCEDURE — 74011250636 HC RX REV CODE- 250/636: Performed by: FAMILY MEDICINE

## 2021-08-20 PROCEDURE — A9500 TC99M SESTAMIBI: HCPCS

## 2021-08-20 PROCEDURE — 74011250636 HC RX REV CODE- 250/636: Performed by: SPECIALIST

## 2021-08-20 PROCEDURE — 36415 COLL VENOUS BLD VENIPUNCTURE: CPT

## 2021-08-20 RX ORDER — TETRAKIS(2-METHOXYISOBUTYLISOCYANIDE)COPPER(I) TETRAFLUOROBORATE 1 MG/ML
31.4 INJECTION, POWDER, LYOPHILIZED, FOR SOLUTION INTRAVENOUS ONCE
Status: COMPLETED | OUTPATIENT
Start: 2021-08-20 | End: 2021-08-20

## 2021-08-20 RX ORDER — METOPROLOL SUCCINATE 25 MG/1
12.5 TABLET, EXTENDED RELEASE ORAL DAILY
Qty: 15 TABLET | Refills: 0 | Status: SHIPPED | OUTPATIENT
Start: 2021-08-21 | End: 2021-09-20

## 2021-08-20 RX ORDER — SOTALOL HYDROCHLORIDE 120 MG/1
60 TABLET ORAL EVERY 12 HOURS
Status: DISCONTINUED | OUTPATIENT
Start: 2021-08-20 | End: 2021-08-20 | Stop reason: HOSPADM

## 2021-08-20 RX ORDER — SODIUM CHLORIDE 0.9 % (FLUSH) 0.9 %
5-40 SYRINGE (ML) INJECTION EVERY 8 HOURS
Status: DISCONTINUED | OUTPATIENT
Start: 2021-08-20 | End: 2021-08-20 | Stop reason: HOSPADM

## 2021-08-20 RX ORDER — CLOPIDOGREL BISULFATE 75 MG/1
75 TABLET ORAL DAILY
Status: DISCONTINUED | OUTPATIENT
Start: 2021-08-20 | End: 2021-08-20 | Stop reason: HOSPADM

## 2021-08-20 RX ORDER — EZETIMIBE 10 MG/1
10 TABLET ORAL DAILY
Status: DISCONTINUED | OUTPATIENT
Start: 2021-08-20 | End: 2021-08-20 | Stop reason: HOSPADM

## 2021-08-20 RX ORDER — PANTOPRAZOLE SODIUM 40 MG/1
40 TABLET, DELAYED RELEASE ORAL DAILY
Status: DISCONTINUED | OUTPATIENT
Start: 2021-08-20 | End: 2021-08-20 | Stop reason: HOSPADM

## 2021-08-20 RX ORDER — ALPRAZOLAM 0.5 MG/1
1 TABLET ORAL
Status: DISCONTINUED | OUTPATIENT
Start: 2021-08-20 | End: 2021-08-20 | Stop reason: HOSPADM

## 2021-08-20 RX ORDER — LISINOPRIL 5 MG/1
5 TABLET ORAL DAILY
Status: DISCONTINUED | OUTPATIENT
Start: 2021-08-20 | End: 2021-08-20 | Stop reason: HOSPADM

## 2021-08-20 RX ORDER — TETRAKIS(2-METHOXYISOBUTYLISOCYANIDE)COPPER(I) TETRAFLUOROBORATE 1 MG/ML
11 INJECTION, POWDER, LYOPHILIZED, FOR SOLUTION INTRAVENOUS ONCE
Status: COMPLETED | OUTPATIENT
Start: 2021-08-20 | End: 2021-08-20

## 2021-08-20 RX ORDER — METOPROLOL SUCCINATE 25 MG/1
25 TABLET, EXTENDED RELEASE ORAL DAILY
Status: DISCONTINUED | OUTPATIENT
Start: 2021-08-21 | End: 2021-08-20

## 2021-08-20 RX ORDER — ASPIRIN 81 MG/1
162 TABLET ORAL DAILY
Status: DISCONTINUED | OUTPATIENT
Start: 2021-08-20 | End: 2021-08-20 | Stop reason: HOSPADM

## 2021-08-20 RX ORDER — FENOFIBRATE 145 MG/1
145 TABLET, COATED ORAL DAILY
Status: DISCONTINUED | OUTPATIENT
Start: 2021-08-20 | End: 2021-08-20 | Stop reason: HOSPADM

## 2021-08-20 RX ORDER — ACETAMINOPHEN 325 MG/1
650 TABLET ORAL
Status: DISCONTINUED | OUTPATIENT
Start: 2021-08-20 | End: 2021-08-20 | Stop reason: HOSPADM

## 2021-08-20 RX ORDER — SODIUM CHLORIDE 0.9 % (FLUSH) 0.9 %
5-40 SYRINGE (ML) INJECTION AS NEEDED
Status: DISCONTINUED | OUTPATIENT
Start: 2021-08-20 | End: 2021-08-20 | Stop reason: HOSPADM

## 2021-08-20 RX ORDER — METOPROLOL SUCCINATE 25 MG/1
12.5 TABLET, EXTENDED RELEASE ORAL DAILY
Status: DISCONTINUED | OUTPATIENT
Start: 2021-08-20 | End: 2021-08-20 | Stop reason: HOSPADM

## 2021-08-20 RX ORDER — METOPROLOL SUCCINATE 50 MG/1
50 TABLET, EXTENDED RELEASE ORAL DAILY
Status: DISCONTINUED | OUTPATIENT
Start: 2021-08-20 | End: 2021-08-20

## 2021-08-20 RX ORDER — ENOXAPARIN SODIUM 100 MG/ML
40 INJECTION SUBCUTANEOUS DAILY
Status: DISCONTINUED | OUTPATIENT
Start: 2021-08-20 | End: 2021-08-20 | Stop reason: HOSPADM

## 2021-08-20 RX ORDER — ROSUVASTATIN CALCIUM 10 MG/1
40 TABLET, COATED ORAL
Status: DISCONTINUED | OUTPATIENT
Start: 2021-08-20 | End: 2021-08-20 | Stop reason: HOSPADM

## 2021-08-20 RX ORDER — ACETAMINOPHEN 650 MG/1
650 SUPPOSITORY RECTAL
Status: DISCONTINUED | OUTPATIENT
Start: 2021-08-20 | End: 2021-08-20 | Stop reason: HOSPADM

## 2021-08-20 RX ADMIN — FENOFIBRATE 145 MG: 145 TABLET, FILM COATED ORAL at 12:08

## 2021-08-20 RX ADMIN — METOPROLOL SUCCINATE 12.5 MG: 25 TABLET, EXTENDED RELEASE ORAL at 12:08

## 2021-08-20 RX ADMIN — PANTOPRAZOLE SODIUM 40 MG: 40 TABLET, DELAYED RELEASE ORAL at 12:08

## 2021-08-20 RX ADMIN — LISINOPRIL 5 MG: 5 TABLET ORAL at 12:09

## 2021-08-20 RX ADMIN — EZETIMIBE 10 MG: 10 TABLET ORAL at 12:08

## 2021-08-20 RX ADMIN — TETRAKIS(2-METHOXYISOBUTYLISOCYANIDE)COPPER(I) TETRAFLUOROBORATE 31.4 MILLICURIE: 1 INJECTION, POWDER, LYOPHILIZED, FOR SOLUTION INTRAVENOUS at 10:51

## 2021-08-20 RX ADMIN — ASPIRIN 162 MG: 81 TABLET, COATED ORAL at 12:09

## 2021-08-20 RX ADMIN — CLOPIDOGREL BISULFATE 75 MG: 75 TABLET ORAL at 12:09

## 2021-08-20 RX ADMIN — TETRAKIS(2-METHOXYISOBUTYLISOCYANIDE)COPPER(I) TETRAFLUOROBORATE 11 MILLICURIE: 1 INJECTION, POWDER, LYOPHILIZED, FOR SOLUTION INTRAVENOUS at 09:25

## 2021-08-20 RX ADMIN — SOTALOL HYDROCHLORIDE 60 MG: 120 TABLET ORAL at 14:53

## 2021-08-20 RX ADMIN — REGADENOSON 0.4 MG: 0.08 INJECTION, SOLUTION INTRAVENOUS at 10:44

## 2021-08-20 RX ADMIN — Medication 10 ML: at 05:32

## 2021-08-20 NOTE — DISCHARGE SUMMARY
Discharge Summary       PATIENT ID: Tonya Skinner  MRN: [id number]   YOB: 1965    DATE OF ADMISSION: 8/19/2021  6:20 PM    DATE OF DISCHARGE: 8/20/2021  PRIMARY CARE PROVIDER: Reinaldo Melvin MD       DISCHARGING PROVIDER: Shashi Blanc MD    To contact this individual call [phone] and ask the  to page. If unavailable ask to be transferred the Adult Hospitalist Department. CONSULTATIONS: IP CONSULT TO HOSPITALIST  IP CONSULT TO [address] [place] [address] Course and Discharge Diagnoses      Tonya Skinner is a 49 y.o. female with a pmhx CAD with past MI, HTN, and SVT, admitted for evaluation of chest pain. ACS was ruled out, troponin was negative x2. Cardiology was consulted, patient underwent a stress test, which was negative for stress-induced ischemia. EF was more than 65%. Patient was bradycardic during hospitalization, lowest heart rate was 53, cardiology decreased the dose of Toprol to 12.5 daily. On my evaluation this morning, patient reported no chest pain, denied any shortness of breath or palpitations. She will be discharged home today and is advised to follow-up with primary cardiologist and electrophysiologist as outpatient.     Discharge diagnoses  Chest pain, rule out ACS  History of SVT  History of PVD  History of coronary artery disease  History of dyslipidemia            PHYSICAL EXAMINATION:   General: Not in any distress  Resp: CTA Bilaterally   CV: S1+S2, No MGR  Abdomen: Soft, non tender  Neuro: Awake, alert, following commands        FOLLOW UP APPOINTMENTS:    Follow-up Information     Follow up With Specialties Details Why Randy Mckeon MD Cardiology  Dr. Maddie Demarco office will call you regarding appointment for followup for Electrophysiology/EP  [address] [phone]        In 1 week  Primary cardiologist               DISCHARGE MEDICATIONS:  Current Discharge Medication List      CONTINUE these medications which have CHANGED    Details   metoprolol succinate (TOPROL-XL) 25 mg XL tablet Take 0.5 Tablets by mouth daily for 30 days. Qty: 15 Tablet, Refills: 0  Start date: 8/21/2021, End date: 9/20/2021         CONTINUE these medications which have NOT CHANGED    Details   mupirocin (BACTROBAN) 2 % ointment Apply  to affected area daily. Qty: 22 g, Refills: 0      meclizine (ANTIVERT) 25 mg tablet Take 25 mg by mouth three (3) times daily as needed for Dizziness. diclofenac (VOLTAREN) 1 % gel APPLY 1 GRAM TO AFFECTED AREA 3 TIMES A DAY AS NEEDED  Refills: 1      traMADol (ULTRAM) 50 mg tablet TAKE 1 TABLET BY MOUTH EVERY 6 TO 8 HRS AS NEEDED FOR PAIN  Refills: 0      ondansetron (ZOFRAN ODT) 8 mg disintegrating tablet Take 1 Tab by mouth every eight (8) hours as needed for Nausea. Qty: 15 Tab, Refills: 0      pantoprazole (PROTONIX) 40 mg tablet Take 40 mg by mouth daily. ALPRAZolam (XANAX) 1 mg tablet Take 1 mg by mouth three (3) times daily as needed for Anxiety.      ezetimibe (ZETIA) 10 mg tablet Take 10 mg by mouth daily. aspirin delayed-release 81 mg tablet TAKE 2 TABLETS BY MOUTH DAILY  Qty: 60 Tab, Refills: 5      SOTALOL HCL (SOTALOL PO) Take 60 mg by mouth two (2) times a day. temazepam (RESTORIL) 30 mg capsule Take 30 mg by mouth nightly as needed for Sleep. rosuvastatin (CRESTOR) 40 mg tablet Take 40 mg by mouth nightly. fenofibrate (LOFIBRA) 160 mg tablet Take 1 Tab by mouth daily. Qty: 90 Tab, Refills: 2    Associated Diagnoses: Pure hypercholesterolemia; Carotid artery disease without cerebral infarction ([facility] [address] Coronary artery disease involving native coronary artery of native heart without angina pectoris; Generalized anxiety disorder; History of tobacco abuse; Family history of coronary artery bypass graft; Dizzy; Mixed hyperlipidemia; Hyperlipidemia, unspecified hyperlipidemia type; Anxiety      lisinopril (PRINIVIL, ZESTRIL) 5 mg tablet Take 1 Tab by mouth daily.   Qty: 90 Tab, Refills: 3    Associated Diagnoses: Coronary artery disease involving native coronary artery of native heart without angina pectoris; Hyperlipidemia, unspecified hyperlipidemia type; Carotid artery disease without cerebral infarction [facility]); Generalized anxiety disorder; History of tobacco abuse; Family history of coronary artery bypass graft; Dizzy; Mixed hyperlipidemia; Pure hypercholesterolemia; Anxiety      clopidogrel (PLAVIX) 75 mg tablet Take 1 Tab by mouth daily. Qty: 90 Tab, Refills: 3    Associated Diagnoses: Coronary artery disease involving native coronary artery of native heart without angina pectoris; Hyperlipidemia, unspecified hyperlipidemia type; Carotid artery disease without cerebral infarction [facility]); Generalized anxiety disorder; History of tobacco abuse; Family history of coronary artery bypass graft; Dizzy; Mixed hyperlipidemia; Pure hypercholesterolemia; Anxiety      OTHER,NON-FORMULARY, Pt takes a \"GI Cocktail\" formulated by Dr. Carissa Terry taking these medications       KLOR-CON M20 20 mEq tablet Comments:   Reason for Stopping:                 NOTIFY YOUR PHYSICIAN FOR ANY OF THE FOLLOWING:   Fever over 101 degrees for 24 hours. Chest pain, shortness of breath, fever, chills, nausea, vomiting, diarrhea, change in mentation, falling, weakness, bleeding. Severe pain or pain not relieved by medications. Or, any other signs or symptoms that you may have questions about. CHRONIC MEDICAL DIAGNOSES:  Problem List as of 8/20/2021 Date Reviewed: 3/1/2021        Codes Class Noted - Resolved    Chest pain ICD-10-CM: R07.9  ICD-9-CM: 786.50  8/19/2021 - Present        Hypertension ICD-10-CM: I10  ICD-9-CM: 401.9  Unknown - Present        JAMEEL (acute kidney injury) ([facility] [address] ICD-10-CM: N17.9  ICD-9-CM: 659. 9  Unknown - Present        Acute chest pain ICD-10-CM: R07.9  ICD-9-CM: 786.50  9/15/2020 - Present        GERD (gastroesophageal reflux disease) ICD-10-CM: K21.9  ICD-9-CM: 530.81  11/1/2019 - Present        S/P coronary artery stent placement ICD-10-CM: Z95.5  ICD-9-CM: V45.82  11/1/2019 - Present        Vocal cord nodules ICD-10-CM: J38.2  ICD-9-CM: 478.5  11/17/2015 - Present        Generalized anxiety disorder ICD-10-CM: F41.1  ICD-9-CM: 300.02  9/11/2015 - Present        Carotid artery disease without cerebral infarction [facility]) ICD-10-CM: I77.9  ICD-9-CM: 447.9  9/4/2015 - Present        CAD (coronary artery disease) ICD-10-CM: I25.10  ICD-9-CM: 414.00  9/4/2015 - Present    Overview Addendum 11/6/2015  2:52 PM by Yossi Gibbs MD     6/12 acute mi, stent to occluded lcx    10/13 unstable angina, stents to rca. Widely patent LCX stents, 30-40% prox LAD stenosis with widely paten mid vessel stent. RCA 50% ostial, 75%prox,           70% mid and 90% prox GURWINDER. 4 by 20mm ostial queta with overlapping 4 by 32mm queta in prox/mid vessel.  2.5 by 18mm queta to prox GURWINDER.    6/12 normal carotid dopplers  9/14 bilateral 10-49% carotid stenoses  2/13 normal stress cardiolyte lvef 65%  1/14 normal stress echo  10/14 normal stress echo               Familial hypercholesterolemia ICD-10-CM: E78.01  ICD-9-CM: 272.0  9/4/2015 - Present        RESOLVED: History of tobacco abuse ICD-10-CM: Z87.891  ICD-9-CM: V15.82  10/12/2015 - 2/8/2016        RESOLVED: Tobacco abuse ICD-10-CM: Z72.0  ICD-9-CM: 305.1  9/4/2015 - 10/12/2015        RESOLVED: Family history of coronary artery bypass graft ICD-10-CM: Z82.49  ICD-9-CM: V17.49  9/4/2015 - 2/8/2016              Greater than 30  minutes were spent with the patient on counseling and coordination of care    Signed:   Vida Celis MD  8/20/2021  2:42 PM

## 2021-08-20 NOTE — DISCHARGE INSTRUCTIONS
Discharge Instructions       PATIENT ID: Isreal Escobar  MRN: [id number]   YOB: 1965    DATE OF ADMISSION: 8/19/2021  6:20 PM    DATE OF DISCHARGE: 8/20/2021    PRIMARY CARE PROVIDER: Joshua Kemp MD     ATTENDING PHYSICIAN: Nitesh Hightower MD  DISCHARGING PROVIDER: Santana Montes MD    To contact this individual call [phone] and ask the  to page. If unavailable ask to be transferred the Adult Hospitalist Department. CONSULTATIONS: IP CONSULT TO HOSPITALIST  IP CONSULT TO CARDIOLOGY    PROCEDURES/SURGERIES: * No surgery found *        FOLLOW UP APPOINTMENTS:   Follow-up Information     Follow up With Specialties Details Why Layton Lewis MD Cardiology  Dr. Mariangel Valdez office will call you regarding appointment for followup for Electrophysiology/EP  150 [facility] [address]        In 1 week  Primary cardiologist               DIET: Cardiac diet      ACTIVITY: As tolerated           DISCHARGE MEDICATIONS:   See Medication Reconciliation Form    · It is important that you take the medication exactly as they are prescribed. · Keep your medication in the bottles provided by the pharmacist and keep a list of the medication names, dosages, and times to be taken in your wallet. · Do not take other medications without consulting your doctor. NOTIFY YOUR PHYSICIAN FOR ANY OF THE FOLLOWING:   Fever over 101 degrees for 24 hours. Chest pain, shortness of breath, fever, chills, nausea, vomiting, diarrhea, change in mentation, falling, weakness, bleeding. Severe pain or pain not relieved by medications. Or, any other signs or symptoms that you may have questions about.       DISPOSITION:  x  Home With:   OT  PT  HH  RN       SNF/Inpatient Rehab/LTAC    Independent/assisted living    Hospice    Other:     CDMP Checked:   Yes x     PROBLEM LIST Updated:  Yes x       Signed:   Santana Montes MD  8/20/2021  2:38 PM

## 2021-08-20 NOTE — TELEPHONE ENCOUNTER
Jeff Marshall, KALPESH Meadows RN; Phil Pedroza  Cc: Jared Silva MD; KALPESH Melendez or Vargas Armando,   Please schedule an appointment with Dr. Casi Maria for 2nd opinion for EP management of this patient in office.  Pt would like to see Dr. Casi Maria and likely transfer EP care to  Harjinder Buckner doesn't want to go to [facility] any longer. She has PMH of CAD /stents and NSVT/PVCs s/p NSVT ablation. She is on beta blocker and sotolol for PVCs.       She follows with Dr. Kimberlee Enriquez but saw Dr. Mohan Casanova for EP. Dr. Gabriela Montgomery saw on consult for atypical chest pain. Please call her with appointment. Thank you.

## 2021-08-20 NOTE — PROGRESS NOTES
Patient for discharge, prescription reviewed, follow ups discussed. IV removed, belongings sent with patient. Patient to be transported home with family. Problem: Falls - Risk of  Goal: *Absence of Falls  Description: Document Cleave García Fall Risk and appropriate interventions in the flowsheet.   8/20/2021 1556 by Henrietta Saunders RN  Outcome: Resolved/Met  Note: Fall Risk Interventions:            Medication Interventions: Teach patient to arise slowly, Patient to call before getting OOB, Evaluate medications/consider consulting pharmacy                8/20/2021 1427 by Henrietta Saunders RN  Outcome: Progressing Towards Goal  Note: Fall Risk Interventions:            Medication Interventions: Teach patient to arise slowly, Patient to call before getting OOB, Evaluate medications/consider consulting pharmacy                   Problem: Patient Education: Go to Patient Education Activity  Goal: Patient/Family Education  8/20/2021 1556 by Henrietta Saunders RN  Outcome: Resolved/Met  8/20/2021 1427 by Henrietta Saunders RN  Outcome: Progressing Towards Goal

## 2021-08-20 NOTE — PROGRESS NOTES
Confirmed with stress lab nurse that stress agent was Lexiscan and not exercise- pt was NOT walked on treadmill but injected with lexiscan for stress test.     08/19/21    NUCLEAR CARDIAC STRESS TEST 08/20/2021, 08/20/2021 8/20/2021    Interpretation Summary  · Baseline ECG: Normal sinus rhythm, non-specific ST-T wave abnormalities. · Stress test: Inconclusive stress test.    Indication: Chest pain. Patient was stressed on a treadmill and achieved 76% of predicted maximum heart rate. A myocardial SPECT gated wall motion study was performed utilizing 11 mCi of Tc MYOVIEW for rest and 1 mCi for stress. SPECT imaging at stress and rest demonstrates no definite evidence of ischemia and/or infarction. Left ventricular ejection fraction equals 67%. Left ventricular wall motion and thickening are within normal limits. Impression  :  1. At the level of exercise achieved, there is no definite evidence of ischemia and/or infarction. 2. LVEF equals 67%. Left ventricular wall motion and thickening is normal.    Signed by:  Lynita Leventhal, MD on 8/20/2021 12:03 PM, Signed by: Estee Francisco MD on 8/20/2021 12:50 PM

## 2021-08-20 NOTE — TELEPHONE ENCOUNTER
Returned patient call and verified identity by two identifiers. Coordinate a New Patient appointment with Dr. Chad Nayak for Oct 5, 2021. Patient verbalized all understanding and had no additional questions.     Future Appointments   Date Time Provider Sheela Cynthia   10/5/2021  2:40 PM MD MARK Madrigal AMB

## 2021-08-20 NOTE — ROUTINE PROCESS
TRANSFER - IN REPORT:    Verbal report received from Ruthie(name) on [facility]  being received from ED(unit) for routine progression of care      Report consisted of patients Situation, Background, Assessment and   Recommendations(SBAR). Information from the following report(s) SBAR, Kardex, ED Summary and MAR was reviewed with the receiving nurse. Opportunity for questions and clarification was provided. Assessment completed upon patients arrival to unit and care assumed.

## 2021-08-20 NOTE — PROGRESS NOTES
Hospital follow-up PCP transitional care appointment has been scheduled with Dr. Rachele Bañuelos for Wednesday, 8/25/21 at 9:40 a.m. Pending patient discharge.   Vangie Diana, Care Management Specialist.

## 2021-08-20 NOTE — PROGRESS NOTES
Problem: Falls - Risk of  Goal: *Absence of Falls  Description: Document Shay Pierce Fall Risk and appropriate interventions in the flowsheet. Outcome: Progressing Towards Goal  Note: Fall Risk Interventions:      Will call if feeling dizzy/lightheaded for assistance

## 2021-08-20 NOTE — PROGRESS NOTES
[facility] [address] [place] [address], [address] [place] [address],[address]   Eben Vick   [phone]                                                                              Cardiology Care Note                          Initial visit      Patient Name: Howard Cunningham - :1965 - TI  Primary Cardiologist: Dr. Victor M Petty. Dr. Sriram Weeks ()  Consulting Cardiologist: Ashlyn Mustafa MD  Reason for Consult: chest pain        Assessment and Plan     1. Chest pain   -atypical features   -EKG NSR, 1st degree AVB, no ischemic findings.   -Troponin negative x2   -No chest pain currently   -Recent Coronary CTA (21) with no significant atherosclerotic disease seen with intact stents, NL EF   -Proceed with lexiscan stress test today. 2. History of PVCs   -tele with NSR-sinus bradycardia to 50's   -given bradycardia, will reduce toprol XL to 12.5mg daily.   -Continue sotolol- notified pharmacy to confirm dose/tablets and ok to use home med if not available in pharmacy   -Continue telemetry    3. Hx of NSVT   -s/p ablation 2018   -no NSVT on telemetry review    4. History of CAD, inferior wall MI with RCA stents and circ stents    -s/p cath and stents (3 stents in , 3 stents in )   -Last cath per Dr. Madyson Carlson note was , non critical CAD (30% ostial RCA)   -Continue ASA, plavix, lisinopril. toprol XL    5. History of familial HLD   -continue zetia, tricor, crestor    6. HTN   -bp controlled   -Lisinopril, Toprol XL,      Pt with intermittent atypical chest pain. Ruled out for MI. Will proceed with nuclear stress test.   Cardiology Attending:Patient seen and examined. I agree with NP assessment and plans.   Premature CAD with history of multiple stents, current symptoms seem atypical, will proceed with stress test.    Alicia Saab MD 2021 2:09 PM    ____________________________________________________________    HPI: Howard Cunningham is a 50 y.o. female admitted 21 for chest pain [R07.9]. Has PMH of MI, CAD s/p 6 stents (last in 2013), familial HLD, HTN, NSVT s/p ablation (6/2018), PVCs on sotolol and toprol XL, carotid stenosis, anxiety. Last LHC was in 2017 which showed noncritical CAD (30% ostial RCA). She also had recent cardiac CTA on 6/28/21 which showed no significant atherosclerotic disease seen with EF 68%. She presents with chief c/o 2 day history of intermittent left sided sharp chest pain. Has had approximately 7-8 episodes each day. No associated aggravating or alleviating factors. She has tried antacids, ASA and nitro without affect on pain. Pain is not exertional or pleuritic in nature, reports episodes are random and last about 10 seconds. Reports she is sweaty after each episode. Came to ER yesterday as she had episode which was increased in intensity, rate 7-8/10. States pain is different that the pain she had when she had MI. Also reports bradycardia. She reports compliance with metoprolol and sotolol. States meds have reduced PVCs but she is bradycardic. However, she denies dizziness, lightheadedness and syncope. Patient Active Problem List   Diagnosis Code    Carotid artery disease without cerebral infarction (HCC) I77.9    CAD (coronary artery disease) I25.10    Familial hypercholesterolemia E78.01    Generalized anxiety disorder F41.1    Vocal cord nodules J38.2    GERD (gastroesophageal reflux disease) K21.9    S/P coronary artery stent placement Z95.5    Acute chest pain R07.9    Hypertension I10    JAMEEL (acute kidney injury) ([facility] [address] N17.9    Chest pain R07.9     No specialty comments available.       Review of Systems:    [] Patient unable to provide secondary to condition    CONSTITUTIONAL: negative     ENT/MOUTH: negative     EYES: negative    CARDIOVASCULAR: chest Pain       RESPIRATORY: negative      GASTROINTESTINAL: negative     GENITOURINARY: negative     MUSCULOSKELETAL: negative      SKIN: negative    NEUROLOGICAL: negative PSYCHOLOGICAL: negative      HEME/LYMPH: negative    ENDOCRINE: negative         Past Medical History:   Diagnosis Date    CAD (coronary artery disease)     Gastrointestinal disorder     hiatal hernia    Hypertension     Ill-defined condition     vertigo    MI (myocardial infarction) (HCC)     PVC (premature ventricular contraction)     SVT (supraventricular tachycardia) (HCC)      Past Surgical History:   Procedure Laterality Date    HX  SECTION      three    HX TUBAL LIGATION      WI CARDIAC SURG PROCEDURE UNLIST  12    stents x 3    WI CARDIAC SURG PROCEDURE UNLIST  10/2013    stents x 3     Current Facility-Administered Medications   Medication Dose Route Frequency    sodium chloride (NS) flush 5-40 mL  5-40 mL IntraVENous Q8H    sodium chloride (NS) flush 5-40 mL  5-40 mL IntraVENous PRN    acetaminophen (TYLENOL) tablet 650 mg  650 mg Oral Q6H PRN    Or    acetaminophen (TYLENOL) suppository 650 mg  650 mg Rectal Q6H PRN    enoxaparin (LOVENOX) injection 40 mg  40 mg SubCUTAneous DAILY    aspirin delayed-release tablet 162 mg  162 mg Oral DAILY    clopidogreL (PLAVIX) tablet 75 mg  75 mg Oral DAILY    fenofibrate nanocrystallized (TRICOR) tablet 145 mg  145 mg Oral DAILY    lisinopriL (PRINIVIL, ZESTRIL) tablet 5 mg  5 mg Oral DAILY    . PHARMACY TO SUBSTITUTE PER PROTOCOL (Reordered from: SOTALOL HCL (SOTALOL PO))    Per Protocol    rosuvastatin (CRESTOR) tablet 40 mg  40 mg Oral QHS    pantoprazole (PROTONIX) tablet 40 mg  40 mg Oral DAILY    ezetimibe (ZETIA) tablet 10 mg  10 mg Oral DAILY    ALPRAZolam (XANAX) tablet 1 mg  1 mg Oral TID PRN    metoprolol succinate (TOPROL-XL) XL tablet 12.5 mg  12.5 mg Oral DAILY    nitroglycerin (NITROSTAT) tablet 0.4 mg  0.4 mg SubLINGual Q5MIN PRN    morphine injection 2 mg  2 mg IntraVENous Q4H PRN       Allergies   Allergen Reactions    Erythromycin Anaphylaxis    Demerol [Meperidine] Other (comments)     hallucination      Family History   Problem Relation Age of Onset    Elevated Lipids Father     Heart Disease Father 46        heart attack    Elevated Lipids Brother     Elevated Lipids Paternal Grandfather     Heart Disease Paternal Grandfather     Hypertension Paternal Grandfather     Stroke Paternal Grandfather       Social History     Socioeconomic History    Marital status: SINGLE     Spouse name: Not on file    Number of children: Not on file    Years of education: Not on file    Highest education level: Not on file   Tobacco Use    Smoking status: Former Smoker     Packs/day: 2.00     Years: 30.00     Pack years: 60.00     Types: Cigarettes     Quit date: 2012     Years since quittin.2    Smokeless tobacco: Never Used   Substance and Sexual Activity    Alcohol use: No     Alcohol/week: 0.0 standard drinks    Drug use: No    Sexual activity: Yes     Partners: Male     Social Determinants of Health     Financial Resource Strain:     Difficulty of Paying Living Expenses:    Food Insecurity:     Worried About Running Out of Food in the Last Year:     Ran Out of Food in the Last Year:    Transportation Needs:     Lack of Transportation (Medical):     Lack of Transportation (Non-Medical):    Physical Activity:     Days of Exercise per Week:     Minutes of Exercise per Session:    Stress:     Feeling of Stress :    Social Connections:     Frequency of Communication with Friends and Family:     Frequency of Social Gatherings with Friends and Family:     Attends [employer] Services: Active Member of Clubs or Organizations:     Attends Club or Organization Meetings:     Marital Status:        Objective:    Physical Exam    Vitals:   Vitals:    21 0408 21 0605 21 0800 21 0843   BP:    116/74   Pulse: (!) 53 (!) 54 (!) 55 (!) 54   Resp:    16   Temp:    98 °F (36.7 °C)   SpO2:    99%   Weight:       Height:           General:    Alert, cooperative, no distress, appears stated age.    Neck:   Supple,    Back:     Symmetric Lungs:     Clear to auscultation bilaterally. Heart[de-identified]    Regular rate and rhythm, S1, S2 normal, no murmur, click, rub or gallop. Abdomen:     Soft, non-tender. Bowel sounds normal.    Extremities:   Extremities normal, atraumatic, no cyanosis or edema. Vascular:   Pulses - 2+   Skin:   Skin color normal. No rashes or lesions on visible areas   Neurologic: Alert, LINDSAY       Telemetry: NSR, sinus bradycardia    ECG:   EKG Results       Procedure 720 Value Units Date/Time    EKG, 12 LEAD, INITIAL [[id number]] Collected: 08/19/21 1848    Order Status: Completed Updated: 08/19/21 1850     Ventricular Rate 62 BPM      Atrial Rate 62 BPM      P-R Interval 210 ms      QRS Duration 82 ms      Q-T Interval 422 ms      QTC Calculation (Bezet) 428 ms      Calculated P Axis 17 degrees      Calculated R Axis 4 degrees      Calculated T Axis 47 degrees      Diagnosis --     Sinus rhythm with 1st degree AV block  Otherwise normal ECG  When compared with ECG of 16-SEP-2020 05:27,  No significant change was found              Data Review:     Radiology:   XR Results (most recent):  Results from Hospital Encounter encounter on 09/15/20    XR CHEST PA LAT    Narrative  Exam:  2 view chest    Indication: Chest pain    Comparison to 12/12/2019. PA and lateral views demonstrate normal heart size. Coronary artery stents are  noted. There is no acute process in the lung fields. The osseous structures are  unremarkable.     Impression  Impression: No acute process or change compared to the prior exam.        Recent Labs     08/20/21  0540 08/19/21 1902   TROIQ <0.05 <0.05     Recent Labs     08/20/21  0540 08/19/21 1902    144   K 3.7 4.3   * 105   CO2 27 28   BUN 14 10   CREA 0.70 0.80   GLU 88 93   CA 9.3 10.0     Recent Labs     08/20/21  0540 08/19/21  1902   WBC 5.1 6.0   HGB 12.2 13.0   HCT 36.5 38.4    244     Recent Labs     08/19/21  1902   AP 58     No results for input(s): CHOL, LDLC in the last 72 hours.    No lab exists for component: TGL, HDLC,  HBA1C  No results for input(s): CRP, TSH, TSHEXT in the last 72 hours.     No lab exists for component: ESR  TOAN Babin-BC  Camila Teran MD      [facility] [address] [place] [address], [address] [place] [address],[address]  [facility], [facility]  [phone]      Alfredo Rand MD

## 2021-08-20 NOTE — H&P
[facility] Adult  Hospitalist Group  History and Physical    Primary Care Provider: Dannie Perez MD  Date of Service:  2021    Subjective: Willy Bowden is a 49 y.o. female with a pmhx CAD with past MI, HTN, and SVT who presents with chest pain. She states that chest pain is left sided and non-radiating. She describes is as sharp and squeezing, and lasts for 10-15 seconds. Episodes have been increasing in frequency. She had a similar presentation in 2021, and had a coronary artery CT that was unremarkable. In the ED, she was bradycardic to 56. Labs were grossly unremarkable with a negative troponin at 1902. EKG showed normal sinus rhythm with first-degree AV block. Review of Systems:    All other review of systems were negative except for those mentioned in the HPI    Past Medical History:   Diagnosis Date    CAD (coronary artery disease)     Gastrointestinal disorder     hiatal hernia    Hypertension     Ill-defined condition     vertigo    MI (myocardial infarction) ([address]     PVC (premature ventricular contraction)     SVT (supraventricular tachycardia) ([address]       Past Surgical History:   Procedure Laterality Date    HX  SECTION      three    HX TUBAL LIGATION      AR CARDIAC SURG PROCEDURE UNLIST  12    stents x 3    AR CARDIAC SURG PROCEDURE UNLIST  10/2013    stents x 3     Prior to Admission medications    Medication Sig Start Date End Date Taking? Authorizing Provider   mupirocin (BACTROBAN) 2 % ointment Apply  to affected area daily. 4/3/21   Jason Reese MD   meclizine (ANTIVERT) 25 mg tablet Take 25 mg by mouth three (3) times daily as needed for Dizziness.     Provider, Historical   diclofenac (VOLTAREN) 1 % gel APPLY 1 GRAM TO AFFECTED AREA 3 TIMES A DAY AS NEEDED 10/14/19   Provider, Historical   KLOR-CON M20 20 mEq tablet TAKE 1 TABLET BY MOUTH TWICE A DAY WITH FOOD 19   Provider, Historical   traMADol (ULTRAM) 50 mg tablet TAKE 1 TABLET BY MOUTH EVERY 6 TO 8 HRS AS NEEDED FOR PAIN 10/10/19   Provider, Historical   metoprolol succinate (TOPROL-XL) 50 mg XL tablet Take 50 mg by mouth daily. Shubham, MD Julieta   ondansetron (ZOFRAN ODT) 8 mg disintegrating tablet Take 1 Tab by mouth every eight (8) hours as needed for Nausea. 3/23/19   Nico Pena MD   pantoprazole (PROTONIX) 40 mg tablet Take 40 mg by mouth daily. Julieta Jalloh MD   ALPRAZolam [employer]) 1 mg tablet Take 1 mg by mouth three (3) times daily as needed for Anxiety. Provider, Historical   ezetimibe (ZETIA) 10 mg tablet Take 10 mg by mouth daily. Provider, Historical   aspirin delayed-release 81 mg tablet TAKE 2 TABLETS BY MOUTH DAILY 4/23/17   Cristino Loza MD   SOTALOL HCL (SOTALOL PO) Take 60 mg by mouth two (2) times a day. Provider, Historical   temazepam (RESTORIL) 30 mg capsule Take 30 mg by mouth nightly as needed for Sleep. Provider, Historical   rosuvastatin (CRESTOR) 40 mg tablet Take 40 mg by mouth nightly. Provider, Historical   fenofibrate (LOFIBRA) 160 mg tablet Take 1 Tab by mouth daily. 10/5/16   Zena Gentile MD   lisinopril (PRINIVIL, ZESTRIL) 5 mg tablet Take 1 Tab by mouth daily. 3/1/16   Zena Gentile MD   clopidogrel (PLAVIX) 75 mg tablet Take 1 Tab by mouth daily. 3/1/16   Zena Gentile MD   OTHER,NON-FORMULARY, Pt takes a \"GI Cocktail\" formulated by Dr. Nadia Irvin    Provider, Historical     Allergies   Allergen Reactions    Erythromycin Anaphylaxis    Demerol [Meperidine] Other (comments)     hallucination      Family History   Problem Relation Age of Onset    Elevated Lipids Father     Heart Disease Father 46        heart attack    Elevated Lipids Brother     Elevated Lipids Paternal Grandfather     Heart Disease Paternal Grandfather     Hypertension Paternal Grandfather     Stroke Paternal Grandfather         SOCIAL HISTORY:  Patient resides at Home.   Patient ambulates independently  Smoking history: none  Alcohol history: none        Objective:       Physical Exam:   Visit Vitals  BP (!) 130/47 (BP 1 Location: Right upper arm, BP Patient Position: At rest)   Pulse (!) 59   Temp 98.8 °F (37.1 °C)   Resp 16   Ht 5' 3\" (1.6 m)   Wt 81.6 kg (180 lb)   SpO2 97%   BMI 31.89 kg/m²     General:  Alert, cooperative, no distress   Head:  Normocephalic, without obvious abnormality, atraumatic. Eyes:  Conjunctivae/corneas clear. EOMs intact. Nose: Nares normal. Septum midline. Throat: Lips, mucosa, and tongue normal..   Neck: Supple, symmetrical, trachea midline   Back:   Symmetric, no curvature. ROM normal. No CVA tenderness. Lungs:   Clear to auscultation bilaterally. Chest wall:  No tenderness or deformity. Heart:  Regular rate and rhythm, S1, S2 normal, systolicmurmur, click, rub or gallop. Abdomen:   Soft, non-tender. Bowel sounds normal. No masses,  No organomegaly. Extremities: Extremities normal, atraumatic, no cyanosis or edema. Pulses: 2+ radial pulses   Skin: Skin color, texture, turgor normal. No rashes or lesions. ECG: SR, 1 AVB    Data Review: All diagnostic labs and studies have been reviewed.     Chest x-ray: ordered, pending    Assessment:     Active Problems:    Chest pain (8/19/2021)        Plan:     #Chest Pain  -presented 6/2021 with similar sx and CT coronary arteries with no significant atherosclerotic disease.  -continue telemetry  -consult cardiology, appreciate rec's    #Hx SVT  #PVD  -continue sotalol and metoprolol    #CAD  #dyslipidemia  -continue tricor, and zetia  -continue home ASA, plavix, metop,and cresto    FEN cardiac  dvt ppx: lovenox  Code: Full      FUNCTIONAL STATUS PRIOR TO HOSPITALIZATION Ambulates Independently     Signed By: Bertrand Duenas MD     August 19, 2021

## 2021-08-20 NOTE — PROGRESS NOTES
Patient up ad dar, no complaints of pain, completed stress test, ok to eat, appetite is good. Problem: Falls - Risk of  Goal: *Absence of Falls  Description: Document Faxon Pillow Fall Risk and appropriate interventions in the flowsheet.   Outcome: Progressing Towards Goal  Note: Fall Risk Interventions:            Medication Interventions: Teach patient to arise slowly, Patient to call before getting OOB, Evaluate medications/consider consulting pharmacy                   Problem: Patient Education: Go to Patient Education Activity  Goal: Patient/Family Education  Outcome: Progressing Towards Goal

## 2021-08-20 NOTE — PROGRESS NOTES
Bedside shift change report given to [address] (oncoming nurse) by Shashi Noonan (offgoing nurse). Report included the following information SBAR, Kardex, MAR and Recent Results.

## 2021-08-27 ENCOUNTER — TRANSCRIBE ORDER (OUTPATIENT)
Dept: SCHEDULING | Age: 56
End: 2021-08-27

## 2021-08-27 DIAGNOSIS — Z12.31 SCREENING MAMMOGRAM FOR HIGH-RISK PATIENT: Primary | ICD-10-CM

## 2021-09-15 NOTE — PROGRESS NOTES
Identified pt with two pt identifiers(name and ). Reviewed record in preparation for visit and have obtained necessary documentation. Chief Complaint   Patient presents with    New Patient     referred by cardiologist, Dr. Coby Dillon 2015   PRIMARY LEARNER Patient   HIGHEST LEVEL OF EDUCATION - PRIMARY LEARNER  SOME COLLEGE   BARRIERS PRIMARY LEARNER NONE   CO-LEARNER CAREGIVER No   PRIMARY LANGUAGE ENGLISH    NEED No   LEARNER PREFERENCE PRIMARY OTHER (COMMENT)   LEARNING SPECIAL TOPICS no   ANSWERED BY patient     self   RELATIONSHIP SELF   ASSESSMENT COMMENT none     3 most recent PHQ Screens 2019   PHQ Not Done Active Diagnosis of Depression or Bipolar Disorder   Little interest or pleasure in doing things -   Feeling down, depressed, irritable, or hopeless -   Total Score PHQ 2 -     Abuse Screening Questionnaire 2019   Do you ever feel afraid of your partner? N   Are you in a relationship with someone who physically or mentally threatens you? N   Is it safe for you to go home?  Y       Coordination of Care Questionnaire:  :   1) Have you been to an emergency room, urgent care, or hospitaliz No chief complaint on file.       HPI:   This very nice 85-year-old male with a history of paroxysmal atrial fib on Eliquis, uncontrolled hypertension, abdominal aortic aneurysm, familial tremor, anxiety, cardiac stent placement, TIA, depression, laryngeal cancer, GERD, colonic polyps, interstitial lung disease, CHF, presents today with constipation and unable to move his bowels for the past 3 days.  Patient states that he thought he could not urinate well and was found to have 400 mL in his bladder.  Patient had a catheter placed here and then stated his urine was probably okay it was all about constipation.  Nurse remove the catheter.  Patient here with some crampy abdominal discomfort that he states if he could just move his bowels he feels better.  He denies any fever chills or cough or cold symptoms.  He has had no nausea or vomiting.  States he really just should have told someone that he was constipated a couple days ago and may be would have to be here.  Patient is here by ambulance from [address] [place] [address].  Patient is a DNR with selective treatment only.  DO NOT INTUBATE.    Pt was interviewed while wearing a protective gown, gloves, and mask with shield. I discussed the risk factors for COVID-19, such are recent travel or contact with ill individuals. Pt denies both any recent travel or exposure to ill individuals.         ROS     Constitutional: No fever, fatigue or weight loss.  Skin: No rash  Eyes: No recent vision problems or eye pain  ENT: No congestion, ear pain, or sore throat  Cardiovascular: No chest pain  Respiratory: No cough, shortness of breath, or wheezing.  Gastrointestinal: No abdominal pain, nausea, vomiting, or diarrhea  Genitourinary: No dysuria  Musculoskeletal: No joint swelling  Neurologic: No headache.  Hematologic: No unusual bruising or bleeding.  Otherwise 10-point review of systems reviewed and otherwise negative.        Patient Active Problem List   Diagnosis   • Post-traumatic  stress syndrome   • Peripheral vascular disease (CMS/HCC)   • Obstructive sleep apnea   • Hypothyroidism   • HTN (hypertension)   • Carotid artery stenosis   • Abdominal aortic aneurysm (AAA) (CMS/HCC)   • Bradycardia   • Coronary artery disease involving native coronary artery of native heart without angina pectoris   • Familial tremor   • Allergic rhinitis   • Anxiety   • Glaucoma   • History of heart artery stent   • History of smoking   • Osteoarthritis   • History of TIA (transient ischemic attack)   • Depression   • Elevated PSA   • GERD (gastroesophageal reflux disease)   • Larynx cancer (CMS/HCC)   • Sensorineural hearing loss, bilateral   • Atherosclerosis of lower extremity with claudication (CMS/HCC)   • Renal artery stenosis (CMS/HCC)   • Benign prostatic hyperplasia without lower urinary tract symptoms   • Hx of colonic polyps   • Acute on chronic diastolic CHF (congestive heart failure) (CMS/HCC)   • Interstitial lung disease (CMS/HCC)   • Paroxysmal atrial fibrillation (CMS/HCC)   • SOB (shortness of breath) on exertion   • Uncontrolled hypertension   • IPF (idiopathic pulmonary fibrosis) (CMS/HCC)   • DIAZ (dyspnea on exertion)   • Uncontrolled hypertension   • Paroxysmal atrial fibrillation (CMS/HCC)   • PAD (peripheral artery disease) (CMS/HCC)   • Anemia associated with acute blood loss   • Coronary artery disease involving native coronary artery   • Encounter for palliative care   • Advance care planning   • AAA (abdominal aortic aneurysm) (CMS/HCC)   • Elevated PSA   • Familial tremor   • GERD (gastroesophageal reflux disease)   • Renal artery stenosis (CMS/HCC)        PAST MEDICAL HX:    Abdominal aortic aneurysm (AAA) (CMS/HCC)                     Coronary artery disease                                       Carotid artery stenosis                                       HTN (hypertension)                                            Peripheral vascular disease (CMS/HCC)                          Post-traumatic stress syndrome                                Bradycardia                                                   Coronary artery disease involving native coron*               Depression                                                    Prostate cancer (CMS/HCC)                                     Skin cancer                                                   Congestive cardiac failure (CMS/HCC)                          Cancer of larynx (CMS/HCC)                                    Atrial fibrillation (CMS/HCC)                                 Arthritis                                                     High cholesterol                                              Sinusitis, chronic                                            Chronic pain                                                    Comment: back    Obstructive sleep apnea                                         Comment: no cpap - uses O2 most of the day    COPD (chronic obstructive pulmonary disease) (*               Gastroesophageal reflux disease                               Thyroid condition                                             Anxiety                                                       Allergic rhinitis                                             Shortness of breath                                           History of smoking                                            IPF (idiopathic pulmonary fibrosis) (CMS/[facility])   11/25/2020    Anemia associated with acute blood loss         12/20/2020    PAST SURGICAL HX:    BRAIN SURGERY                                                   Comment: Fixed tremor    DEEP BRAIN STIMULATOR PLACEMENT                 09/2019         Comment: no curent stimulator in place- external and                then removed    CORONARY ANGIOPLASTY WITH STENT PLACEMENT                     JOINT REPLACEMENT                                               Comment: TSR    KNEE ARTHROSCOPY W/ DEBRIDEMENT                                SHOULDER ARTHROSCOPY                                          LOWER EXTREMITY ANGIOGRAM                                     Social History     Tobacco Use   • Smoking status: Former Smoker     Types: Cigarettes, Cigars     Start date: 1952     Quit date: 2001     Years since quittin.8   • Smokeless tobacco: Never Used   • Tobacco comment: quit cigars in - quit cigg in    Vaping Use   • Vaping Use: never used   Substance Use Topics   • Alcohol use: Not Currently   • Drug use: Never        ED Triage Vitals [09/15/21 1225]   BP (!) 171/72   Heart Rate 62   Resp 18   Temp 97.3 °F (36.3 °C)   SpO2 98 %        Physical Exam  Vitals and nursing note reviewed.   Constitutional:       General: He is not in acute distress.     Appearance: Normal appearance. He is obese. He is not ill-appearing, toxic-appearing or diaphoretic.   HENT:      Head: Normocephalic and atraumatic.      Nose: Nose normal.      Mouth/Throat:      Mouth: Mucous membranes are moist.   Eyes:      General: No scleral icterus.     Extraocular Movements: Extraocular movements intact.      Conjunctiva/sclera: Conjunctivae normal.      Pupils: Pupils are equal, round, and reactive to light.   Neck:      Vascular: No JVD.   Cardiovascular:      Rate and Rhythm: Normal rate and regular rhythm.      Heart sounds: Normal heart sounds. No murmur heard.     Pulmonary:      Effort: Pulmonary effort is normal. No respiratory distress.      Breath sounds: Normal breath sounds.   Chest:      Chest wall: No tenderness.   Abdominal:      General: Bowel sounds are normal. There is no distension.      Palpations: Abdomen is soft. There is no mass.      Tenderness: There is no abdominal tenderness. There is no right CVA tenderness, left CVA tenderness, guarding or rebound.   Genitourinary:     Penis: Normal.       Comments: Moderate brown stool in rectum, disimpacted large amount.  Musculoskeletal:         General: No tenderness. Normal range of  motion.      Cervical back: Neck supple.   Lymphadenopathy:      Cervical: No cervical adenopathy.   Skin:     General: Skin is warm and dry.      Coloration: Skin is not pale.      Findings: No erythema or rash.   Neurological:      Mental Status: He is alert and oriented to person, place, and time.      Cranial Nerves: No cranial nerve deficit.      Sensory: No sensory deficit.      Motor: No weakness.      Coordination: Coordination normal.   Psychiatric:         Mood and Affect: Mood and affect normal.         Judgment: Judgment normal.              Results for orders placed or performed during the hospital encounter of 09/15/21   Comprehensive Metabolic Panel   Result Value    Fasting Status     Sodium 140    Potassium 4.1    Chloride 103    Carbon Dioxide 28    Anion Gap 13    Glucose 109 (H)    BUN 24 (H)    Creatinine 0.99    Glomerular Filtration Rate 70     Comment: eGFR results = or >60 mL/min/1.73m2 = Normal kidney function. Estimated GFR calculated using the 2009 CKD-EPI creatinine equation.      BUN/ Creatinine Ratio 24    Calcium 8.9    Bilirubin, Total 0.3    GOT/AST 20    GPT/ALT 18    Alkaline Phosphatase 74    Albumin 3.1 (L)    Protein, Total 7.1    Globulin 4.0    A/G Ratio 0.8 (L)   Lactic Acid, Venous   Result Value    Lactate, Venous 1.8   Lipase   Result Value    Lipase 140   Magnesium   Result Value    Magnesium 2.2   Urinalysis & Reflex Microscopy With Culture If Indicated   Result Value    COLOR, URINALYSIS Yellow    APPEARANCE, URINALYSIS Clear    GLUCOSE, URINALYSIS Negative    BILIRUBIN, URINALYSIS Negative    KETONES, URINALYSIS Negative    SPECIFIC GRAVITY, URINALYSIS 1.015    OCCULT BLOOD, URINALYSIS Negative    PH, URINALYSIS 7.0    PROTEIN, URINALYSIS Negative    UROBILINOGEN, URINALYSIS 0.2    NITRITE, URINALYSIS Negative    LEUKOCYTE ESTERASE, URINALYSIS Negative   CBC with Automated Differential (performable only)   Result Value    WBC 6.6    RBC 3.33 (L)    HGB 9.6 (L)    HCT  29.4 (L)    MCV 88.3    MCH 28.8    MCHC 32.7    RDW-CV 13.2    RDW-SD 42.5        NRBC 0    Neutrophil, Percent 61    Lymphocytes, Percent 27    Mono, Percent 8    Eosinophils, Percent 3    Basophils, Percent 0    Immature Granulocytes 1    Absolute Neutrophils 4.1    Absolute Lymphocytes 1.8    Absolute Monocytes 0.5    Absolute Eosinophils  0.2    Absolute Basophils 0.0    Absolute Immmature Granulocytes 0.0         Imaging Results          CT ABDOMEN PELVIS W CONTRAST (Final result)  Result time 09/15/21 17:26:42    Final result                 Impression:    Large amount of retained fecal debris in the rectal ampulla  that measures 5.8 cm in diameter associated with perirectal and presacral  surrounding induration.  Possibility of stercoral colitis is not excluded.   Clinical correlation follow-up advised.    Pulmonary fibrosis changes lung bases.  Follow-up advised.    Coronary calcifications.    Vascular calcifications present no obstructing stones both kidneys.    Stable 3.1 cm saccular aneurysmal dilatation of the infrarenal abdominal  aorta.    Electronically Signed by: JIMI MADDOX M.D.   Signed on: 9/15/2021 5:26 PM                Narrative:    EXAM:  CT ABDOMEN PELVIS W CONTRAST    CLINICAL INDICATION: Abdominal pain.     COMPARISON:  October 2020.    TECHNIQUE: Axial images through the abdomen and pelvis with multiplanar  reconstructions.  Automated dose reduction protocol utilized. mA and/or kVp  was adjusted for patient size.     CONTRAST: 80 mL of Omnipaque 350 was administered intravenously.    FINDINGS:  Redemonstrated subpleural honeycombing and reticular opacities  secondary to fibrosis.  Dystrophic calcifications and or granulomatous lung  bases.  No pleural effusion or pneumothorax.  Coronary calcifications.   Subcentimeter hypodensities throughout the liver too small to characterize  likely cysts and are hemangiomas.  The gallbladder is unremarkable.  The  pancreas and spleen are  unremarkable.  The adrenal glands are normal  bilaterally.  Vascular calcifications versus small nonobstructing stones  both kidneys.  There is no lymphadenopathy by size criteria.   Atherosclerotic changes abdominal aorta and branches with small saccular  aneurysmal dilatation of the infrarenal aorta measuring 3.1 cm.  Advanced  atherosclerotic changes in the iliac circulation.  Partially distended  urinary bladder and probable small diverticulum on the right side.  Likely  postsurgical changes in the prostate bed.  Correlate clinically.  Large  amount of retained fecal debris in the rectal ampulla that measures up to  5.8 cm in diameter associated with perirectal and presacral surrounding  induration.  Possibility of stercoral colitis not excluded.  Correlate  clinically.  Small fat-containing right inguinal hernia.  No secondary  signs suggest appendicitis.  Degenerative changes lower lumbar spine with  fusion at L5/S1.                               XR ABDOMEN 1 VIEW (Final result)  Result time 09/15/21 15:38:00    Final result                 Impression:       Nonobstructive bowel gas pattern. Moderate rectal stool burden.      Electronically Signed by: VICTORIA MARCIAL M.D.   Signed on: 9/15/2021 3:38 PM                Narrative:    EXAM: XR ABDOMEN 1 VIEW    CLINICAL INDICATION: Constipation    COMPARISON: Radiograph 2/24/2018, chest x-ray 9/10/2021    FINDINGS: Nonobstructive bowel gas pattern. Moderate rectal stool burden  suspected. Multifocal vascular calcifications with probable iliac stent(s).  Degenerative changes in the spine. Reticular opacities in the partially  imaged lungs are better assessed on recent chest imaging.                                 Vitals:    09/15/21 1557 09/15/21 1558 09/15/21 1559 09/15/21 1601   BP:    (!) 176/59   BP Location:    LUE - Left upper extremity   Pulse:       Resp:       Temp:       TempSrc:       SpO2: 100% 100% 100%    Weight:       Height:               ED  Medication Orders (From admission, onward)    Ordered Start     Status Ordering Provider    09/15/21 1635 09/15/21 1636  metoCLOPramide (REGLAN) injection 5 mg  ONCE         Last MAR action: Given MCKAYLA MARTINI    09/15/21 1635 09/15/21 1636  diphenhydrAMINE (BENADRYL) injection 12.5 mg  ONCE         Last MAR action: Given MCKAYLA MARTINI    09/15/21 1607 09/15/21 1608  ondansetron (ZOFRAN) injection 8 mg  ONCE         Last MAR action: Given MCKAYLA MARTINI NAYANA    09/15/21 1607 09/15/21 1607  ONDANSETRON HCL 4 MG/2ML IJ SOLDELTA [place] Override        Note to Pharmacy: Sulema Rogers   : cabinet override    Ordered              EKG:   ED interpretation at 1714 p.m.  Normal sinus rhythm with a ventricular rate of 72, RI interval 172, QRS duration of 94 and axis of -5.  No acute ST segment changes.        MDM:    Patient here with attempted to disimpact and soapsuds given.  Patient vomited x1 while trying to get a bowel movement.  Patient was given Zofran and some mild IV fluid.  Patient vomited again and Reglan and Benadryl were given.  Will get CT scan to ensure no obstruction however flat plain film showed no evidence of that.  Unsure why patient is vomiting here.  Discussed with hospitalist, all laboratory results are normal.  Will get CT scan and admit patient for observation.    ED Course as of Sep 15 1732   Wed Sep 15, 2021   1638 Discussed case with [facility] hospitalist Merly, agrees with plan for admission    [EH]      ED Course User Index  [EH] Mckayla Martini DO        ED Diagnoses        Final diagnoses    Vomiting without nausea, intractability of vomiting not specified, unspecified vomiting type          Constipation, unspecified constipation type          Abdominal pain, unspecified abdominal location                        Disposition: Admit to hospital                           Mckayla Martini DO  09/15/21 1732

## 2021-10-01 ENCOUNTER — HOSPITAL ENCOUNTER (OUTPATIENT)
Dept: MAMMOGRAPHY | Age: 56
Discharge: HOME | End: 2021-10-01
Attending: INTERNAL MEDICINE
Payer: COMMERCIAL

## 2021-10-01 DIAGNOSIS — Z12.31 SCREENING MAMMOGRAM FOR HIGH-RISK PATIENT: ICD-10-CM

## 2021-10-01 PROCEDURE — 77063 BREAST TOMOSYNTHESIS BI: CPT

## 2021-10-05 ENCOUNTER — OFFICE VISIT (OUTPATIENT)
Dept: CARDIOLOGY CLINIC | Age: 56
End: 2021-10-05
Payer: COMMERCIAL

## 2021-10-05 VITALS
HEART RATE: 64 BPM | WEIGHT: 183 LBS | HEIGHT: 63 IN | BODY MASS INDEX: 32.43 KG/M2 | DIASTOLIC BLOOD PRESSURE: 80 MMHG | SYSTOLIC BLOOD PRESSURE: 140 MMHG | OXYGEN SATURATION: 98 % | RESPIRATION RATE: 12 BRPM

## 2021-10-05 DIAGNOSIS — I47.29 NSVT (NONSUSTAINED VENTRICULAR TACHYCARDIA): ICD-10-CM

## 2021-10-05 DIAGNOSIS — I49.3 PVC (PREMATURE VENTRICULAR CONTRACTION): Primary | ICD-10-CM

## 2021-10-05 DIAGNOSIS — Q75.2 HYPERTELORISM: ICD-10-CM

## 2021-10-05 DIAGNOSIS — E78.01 FAMILIAL HYPERCHOLESTEROLEMIA: ICD-10-CM

## 2021-10-05 DIAGNOSIS — I25.10 CORONARY ARTERY DISEASE INVOLVING NATIVE CORONARY ARTERY OF NATIVE HEART, UNSPECIFIED WHETHER ANGINA PRESENT: ICD-10-CM

## 2021-10-05 PROCEDURE — 99204 OFFICE O/P NEW MOD 45 MIN: CPT | Performed by: INTERNAL MEDICINE

## 2021-10-05 RX ORDER — PROMETHAZINE HYDROCHLORIDE 25 MG/1
TABLET ORAL
COMMUNITY
Start: 2021-06-28 | End: 2022-01-11

## 2021-10-05 RX ORDER — CITALOPRAM 40 MG/1
40 TABLET, FILM COATED ORAL AS NEEDED
COMMUNITY
Start: 2021-09-13

## 2021-10-05 RX ORDER — NITROGLYCERIN 0.4 MG/1
0.4 TABLET SUBLINGUAL
COMMUNITY

## 2021-10-05 RX ORDER — METOPROLOL TARTRATE 25 MG/1
25 TABLET, FILM COATED ORAL DAILY
COMMUNITY

## 2021-10-05 RX ORDER — FUROSEMIDE 20 MG/1
TABLET ORAL
COMMUNITY
Start: 2021-09-13

## 2021-10-05 RX ORDER — RANOLAZINE 500 MG/1
500 TABLET, EXTENDED RELEASE ORAL 2 TIMES DAILY
Qty: 60 TABLET | Refills: 3 | Status: SHIPPED | OUTPATIENT
Start: 2021-10-05 | End: 2021-11-05 | Stop reason: ALTCHOICE

## 2021-10-05 RX ORDER — POTASSIUM CHLORIDE 1500 MG/1
20 TABLET, EXTENDED RELEASE ORAL DAILY
COMMUNITY
Start: 2021-09-30

## 2021-10-05 NOTE — PROGRESS NOTES
Cardiac Electrophysiology OFFICE Consultation Note     Subjective: Aaron Layne is a 49 y.o. patient who is seen for evaluation of chest pain and PVC NSVT  She still has intermittent chest pain but nuclear stress test with Dr Lisa Palafox during [facility] admission was normal  She does not see Dr Lisa Palafox in office but had seen Dr Ranjit Walden    She has been seen and ablated 2 times by Dr Tonio Marks at [facility] according to her  Sotalol and metoprolol control her PVC and NSVT most of the time but caused sinus bradycardia and she is trying to get another EP beside [facility] since it was too busy over there  Dr Ranjit Walden and Lisa Palafox had seen her and Dr Ranjit Walden had done cardiac cath for her in 2017  She had cardiac CT with Dr Hanane Hu  She wants [facility] cardiology because of the large group and she hopes she will always be seen fast when needed for chest pain and palpitations  She would consider pacemaker when needed but not another ablation of PVC she said  She takes sotalol, celexa and zofran and there is risk of Tdp    Nuclear Medicine Results (most recent):  8/2021    1. At the level of exercise achieved, there is no definite evidence of ischemia and/or infarction. 2. LVEF equals 67%. Left ventricular wall motion and thickening is normal.       ECHO ADULT COMPLETE 09/16/2020 9/16/2020    Interpretation Summary  · LV: Estimated LVEF is 55 - 60%. Normal cavity size, wall thickness and systolic function (ejection fraction normal). Mild (grade 1) left ventricular diastolic dysfunction.     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  furosemide (LASIX) 20 mg tablet TAKE 1 TAB BY MOUTH EVERY DAY AS NEEDED FOR EDEMA (TAKE ADDITIONAL TAB OF POTASSIUM WHEN USING THIS)      Klor-Con M20 20 mEq tablet Take 20 mEq by mouth daily. Taking 10 meq daily      promethazine (PHENERGAN) 25 mg tablet TAKE 1 TABLET BY MOUTH EVERY 6 HOURS AS NEEDED FOR NAUSEA      metoprolol tartrate (LOPRESSOR) 25 mg tablet Take 25 mg by mouth daily.  ranolazine ER (Ranexa) 500 mg SR tablet Take 1 Tablet by mouth two (2) times a day. 60 Tablet 3    mupirocin (BACTROBAN) 2 % ointment Apply  to affected area daily. (Patient taking differently: Apply  to affected area daily as needed.) 22 g 0    meclizine (ANTIVERT) 25 mg tablet Take 25 mg by mouth three (3) times daily as needed for Dizziness.  diclofenac (VOLTAREN) 1 % gel APPLY 1 GRAM TO AFFECTED AREA 3 TIMES A DAY AS NEEDED  1    ondansetron (ZOFRAN ODT) 8 mg disintegrating tablet Take 1 Tab by mouth every eight (8) hours as needed for Nausea. 15 Tab 0    pantoprazole (PROTONIX) 40 mg tablet Take 40 mg by mouth daily.  ALPRAZolam (XANAX) 1 mg tablet Take 1 mg by mouth three (3) times daily as needed for Anxiety.  ezetimibe (ZETIA) 10 mg tablet Take 10 mg by mouth daily.  aspirin delayed-release 81 mg tablet TAKE 2 TABLETS BY MOUTH DAILY 60 Tab 5    SOTALOL HCL (SOTALOL PO) Take 60 mg by mouth two (2) times a day.  temazepam (RESTORIL) 30 mg capsule Take 30 mg by mouth nightly as needed for Sleep.  rosuvastatin (CRESTOR) 40 mg tablet Take 40 mg by mouth nightly.  fenofibrate (LOFIBRA) 160 mg tablet Take 1 Tab by mouth daily. 90 Tab 2    lisinopril (PRINIVIL, ZESTRIL) 5 mg tablet Take 1 Tab by mouth daily.  (Patient taking differently: Take 2.5 mg by mouth daily.) 90 Tab 3    clopidogrel (PLAVIX) 75 mg tablet Take 1 Tab by mouth daily. 90 Tab 3    nitroglycerin (NITROSTAT) 0.4 mg SL tablet 0.4 mg by SubLINGual route. Allergies   Allergen Reactions    Erythromycin Anaphylaxis    Demerol [Meperidine] Other (comments)     hallucination     Past Medical History:   Diagnosis Date    CAD (coronary artery disease)     Gastrointestinal disorder     hiatal hernia    Hypertension     Ill-defined condition     vertigo    MI ([facility]ocardial infarction) ([facility] [address]     PVC (premature ventricular contraction)     SVT [facility]ra[facility]ricular tachycardia) [facility]      Past Surgical History:   Procedure Laterality Date    HX  SECTION      three    HX TUBAL LIGATION      MS CARDIAC SURG PROCEDURE UNLIST  12    stents x 3    MS CARDIAC SURG PROCEDURE UNLIST  10/2013    stents x 3     Family History   Problem Relation Age of Onset    Elevated Lipids Father     Heart Disease Father 46        heart attack    Elevated Lipids Brother     Elevated Lipids Paternal Grandfather     Heart Disease Paternal Grandfather     Hypertension Paternal Grandfather     Stroke Paternal Grandfather      Social History     Tobacco Use    Smoking status: Former Smoker     Packs/day: 2.00     Years: 30.00     Pack years: 60.00     Types: Cigarettes     Quit date: 2012     Years since quittin.3    Smokeless tobacco: Never Used   Substance Use Topics    Alcohol use: No     Alcohol/week: 0.0 standard drinks        Review of Systems:   Constitutional: Negative for fever, chills, weight loss, malaise/fatigue. HEENT: Negative for nosebleeds, vision changes. Respiratory: Negative for cough, hemoptysis  Cardiovascular: Negative for orthopnea, claudication, leg swelling, syncope, and PND. Gastrointestinal: Negative for nausea, vomiting, diarrhea, blood in stool and melena. Genitourinary: Negative for dysuria, and hematuria. Musculoskeletal: Negative for myalgias, arthralgia.    Skin: Negative for rash. Heme: Does not bleed or bruise easily. Neurological: Negative for speech change and focal weakness     Objective:     Visit Vitals  BP (!) 140/80 (BP 1 Location: Right upper arm, BP Patient Position: Sitting, BP Cuff Size: Adult)   Pulse 64   Resp 12   Ht 5' 3\" (1.6 m)   Wt 183 lb (83 kg)   SpO2 98%   BMI 32.42 kg/m²      Physical Exam:   Constitutional: well-developed and well-nourished. No respiratory distress. Head: Normocephalic and atraumatic. Eyes: Pupils are equal, round  ENT: hearing normal  Neck: supple. No JVD present. Cardiovascular: Normal rate, regular rhythm. Exam reveals no gallop and no friction rub. No murmur heard. Pulmonary/Chest: Effort normal and breath sounds normal. No wheezes. Abdominal: Soft, no tenderness. + mild obesity  Musculoskeletal: no edema. Neurological: alert,oriented. Skin: Skin is warm and dry  Psychiatric: normal mood and affect. Behavior is normal. Judgment and thought content normal.           Assessment/Plan:       ICD-10-CM ICD-9-CM    1. PVC (premature ventricular contraction)  I49.3 427.69    2. Coronary artery disease involving native coronary artery of native heart, unspecified whether angina present  I25.10 414.01    3. Familial hypercholesterolemia  E78.01 272.0    4. 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  NSVT (nonsustained ventricular tachycardia) ([facility])  I47.2 427.1      Orders Placed This Encounter    citalopram (CELEXA) 40 mg tablet    furosemide (LASIX) 20 mg tablet    Klor-Con M20 20 mEq tablet    nitroglycerin (NITROSTAT) 0.4 mg SL tablet    promethazine (PHENERGAN) 25 mg tablet    metoprolol tartrate (LOPRESSOR) 25 mg tablet    ranolazine ER (Ranexa) 500 mg SR tablet      She is emotional over her cardiac problem and does not feel there is an end to this  She still has intermittent chest pain and PVC palpitation and have to rely on medications to control  She is tired when her heart rate dipped lower  She wonders if she will need pacemaker  She needs celexa but also asked about xanax PRN  Anxiety drives her bp up   I discussed ranexa and sent in for her to use of stable chronic angina and may help further with PVC NSVT  She does not want to make appt yet to follow up with Dr Yennifer Rucker. She still sees Dr Rolanda Bamberger  She wants to call back to make appt later. She wants to see me because she hopes she can be seen in office right away if chest pain and PVC starts to bother her more and she cannot see Dr Arauz Push at [address] [place] [address] until March  I explained to her that there will be some waiting time to get in clinic wherever she goes  If she wants to change practice location to [facility] then she needs to see both me and Dr Yennifer Rucker so either one of us could be available to see her sooner  Again she was not ready to make new follow up appt today    Thank you for involving me in this patient's care and please call with further concerns or questions. Arabella Stanley M.D.   Electrophysiology/Cardiology  [facility]  [address] [place] [address]                             [phone]

## 2021-10-05 NOTE — PROGRESS NOTES
Recently seen by Dr. Faye Kellogg at [facility]. Has had PVC abalation. Recurred bigeminy. History of NSVT. To continue to see Dr. Jaime Cash    Chief Complaint   Patient presents with    New Patient     history of PVC ablation. Continue chest pain/ shortness of breath. Denies dizziness.      [facility] Follow Up     Visit Vitals  BP (!) 140/80 (BP 1 Location: Right upper arm, BP Patient Position: Sitting, BP Cuff Size: Adult)   Pulse 64   Resp 12   Ht 5' 3\" (1.6 m)   Wt 183 lb (83 kg)   SpO2 98%   BMI 32.42 kg/m²

## 2021-10-06 ENCOUNTER — TELEPHONE (OUTPATIENT)
Dept: CARDIOLOGY CLINIC | Age: 56
End: 2021-10-06

## 2021-10-06 NOTE — TELEPHONE ENCOUNTER
Verified patient with two types of identifiers. Notified patient that I also received the denial. They are requesting Isosorbide Mononitrate as an alternative. Notified patient will try for PA but assume it will be denied since she has not tried and failed alternative. Notified patient will submit PA today and keep her updated. Patient verbalized understanding and will call with any other questions. PA completed via covermymeds.  PA pending with case ID: [id number]

## 2021-10-06 NOTE — TELEPHONE ENCOUNTER
[employer] denied the medication Ranexa that  prescribe on yesterday and she would like him to provide medical documentation Patient called the insurance office this morning and Mohit Alaniz would approve the medicine with medical documentation of heart issues stating that she needs this medicine. Please give a call back.     Phone [phone]

## 2021-10-06 NOTE — TELEPHONE ENCOUNTER
Received fax from [employer] notifying that Ranolazine ER is not covered by insurance. Preferred alternative Isosorbide Mononitrate.  Will ask MD/NP if alternative is acceptable

## 2021-10-07 NOTE — TELEPHONE ENCOUNTER
PA denied for Ranolazine \    Verified patient with two types of identifiers. [employer] requesting appeals information. Pharmacists states I can send supporting information to [phone]. Faxed supporting documentation to [employer] at fax number [phone]. Fax confirmation received.

## 2021-10-14 ENCOUNTER — TRANSCRIBE ORDER (OUTPATIENT)
Dept: SCHEDULING | Age: 56
End: 2021-10-14

## 2021-10-14 DIAGNOSIS — D17.71 BENIGN LIPOMATOUS NEOPLASM OF KIDNEY: Primary | ICD-10-CM

## 2021-10-14 NOTE — TELEPHONE ENCOUNTER
Called [employer] to check on appeal status. Spoke with pharmacy who states he can see appeal was received and currently in review. Can take up to 30 days to make decision.      Will send [employer] message to update patient

## 2021-11-04 NOTE — TELEPHONE ENCOUNTER
[employer] regarding update on appeal status. Was notified by representative that fax number previously given was for another prior authorization which was denied. Notified representative that I called on 10/14/21 and was notified by their pharmacy that they could seen the appeal and it was being reviewed. Notified that was incorrect information. New PA completed over the phone. New PA ID: [id number]. Notified decision should be made within 24 hours. Patient verbalized understanding and will call with any other questions.

## 2021-11-05 RX ORDER — ISOSORBIDE MONONITRATE 30 MG/1
30 TABLET, EXTENDED RELEASE ORAL DAILY
Qty: 30 TABLET | Refills: 1 | Status: SHIPPED | OUTPATIENT
Start: 2021-11-05 | End: 2022-01-03

## 2021-11-05 NOTE — TELEPHONE ENCOUNTER
Received fax from [employer] that second attempt at [place] for Ranexa has again been denied. Will notify MD regarding any alternative. Patient states she followed up with Dr. Marnie Toussaint as well who is trying to get patient set up for a cardiac MRI.  Will notify MD/NP

## 2021-11-05 NOTE — TELEPHONE ENCOUNTER
Our last echo on record in 09/2020 did show grade 1 diastolic dysfunction, but this is unlikely to be causing current chest pain & palpitations.

## 2021-11-05 NOTE — TELEPHONE ENCOUNTER
Verified patient with two types of identifiers. Notified of NP recommendations. Patient will try Imdur 30 mg daily. Patient also said that she was told by the NP at Dr. Amato [facility] office that she had grade 1 diastolic dysfunction and wanted to know if we saw as well. If so, if that was contributing to some of her symptoms.  Will ask MD/NP

## 2021-11-09 NOTE — TELEPHONE ENCOUNTER
Verified patient with two types of identifiers. Notified of NP recommendations. Patient is scheduled for cardiac MRI on 11/30/21. Will await results. Patient verbalized understanding and will call with any other questions.

## 2022-01-11 ENCOUNTER — OFFICE VISIT (OUTPATIENT)
Dept: HEMATOLOGY | Age: 57
End: 2022-01-11
Payer: COMMERCIAL

## 2022-01-11 VITALS
OXYGEN SATURATION: 100 % | SYSTOLIC BLOOD PRESSURE: 116 MMHG | HEART RATE: 60 BPM | TEMPERATURE: 96.8 F | RESPIRATION RATE: 16 BRPM | WEIGHT: 185 LBS | HEIGHT: 63 IN | BODY MASS INDEX: 32.78 KG/M2 | DIASTOLIC BLOOD PRESSURE: 67 MMHG

## 2022-01-11 DIAGNOSIS — K76.0 FATTY LIVER: Primary | ICD-10-CM

## 2022-01-11 LAB
ALBUMIN SERPL-MCNC: 4.1 G/DL (ref 3.5–5)
ALBUMIN/GLOB SERPL: 1.4 {RATIO} (ref 1.1–2.2)
ALP SERPL-CCNC: 72 U/L (ref 45–117)
ALT SERPL-CCNC: 33 U/L (ref 12–78)
ANION GAP SERPL CALC-SCNC: 5 MMOL/L (ref 5–15)
AST SERPL-CCNC: 25 U/L (ref 15–37)
BILIRUB DIRECT SERPL-MCNC: 0.2 MG/DL (ref 0–0.2)
BILIRUB SERPL-MCNC: 0.5 MG/DL (ref 0.2–1)
BUN SERPL-MCNC: 11 MG/DL (ref 6–20)
BUN/CREAT SERPL: 12 (ref 12–20)
CALCIUM SERPL-MCNC: 9.8 MG/DL (ref 8.5–10.1)
CHLORIDE SERPL-SCNC: 110 MMOL/L (ref 97–108)
CO2 SERPL-SCNC: 28 MMOL/L (ref 21–32)
CREAT SERPL-MCNC: 0.89 MG/DL (ref 0.55–1.02)
GLOBULIN SER CALC-MCNC: 2.9 G/DL (ref 2–4)
GLUCOSE SERPL-MCNC: 88 MG/DL (ref 65–100)
POTASSIUM SERPL-SCNC: 4.2 MMOL/L (ref 3.5–5.1)
PROT SERPL-MCNC: 7 G/DL (ref 6.4–8.2)
SODIUM SERPL-SCNC: 143 MMOL/L (ref 136–145)

## 2022-01-11 PROCEDURE — 99214 OFFICE O/P EST MOD 30 MIN: CPT | Performed by: NURSE PRACTITIONER

## 2022-01-11 NOTE — PROGRESS NOTES
Identified pt with two pt identifiers(name and ). Reviewed record in preparation for visit and have obtained necessary documentation. No chief complaint on file. There were no vitals filed for this visit. Health Maintenance Review: Patient reminded of \"due or due soon\" health maintenance. I have asked the patient to contact his/her primary care provider (PCP) for follow-up on his/her health maintenance. Coordination of Care Questionnaire:  :   1) Have you been to an emergency room, urgent care, or hospitalized since your last visit? If yes, where when, and reason for visit? Yes, [facility] chest pain      2. Have seen or consulted any other health care provider since your last visit? If yes, where when, and reason for visit? YES, Cardiologist f/u      Patient is accompanied by friend I have received verbal consent from [facility] to discuss any/all medical information while they are present in the room.

## 2022-01-11 NOTE — PROGRESS NOTES
[address] [facility], Ileana STERN Vallorie Easterly, MD Larayne Bach, PA-C    Yodit Blanc, [facility]     Jenn Valdez, [facility]   WEST Ball, [facility]       Barbara Mejia [facility] 136    at [address]    217 [facility], [address] [place] [address], [facility] 22.    [phone]    FAX: [address] [place] [address], [address]    [phone]    FAX: [phone]     Patient Care Team:  Yoseph Spain MD as PCP - General (Internal Medicine)  James Fitzpatrick MD as Physician (Gastroenterology)  Jesse Cooks, MD (Cardiology)  Isamar Lake MD (Cardiology)  Fredi No MD (Cardiology)    Problem List  Date Reviewed: 10/5/2021          Codes Class Noted    Chest pain ICD-10-CM: R07.9  ICD-9-CM: 786.50  8/19/2021        Hypertension ICD-10-CM: I10  ICD-9-CM: 401.9  Unknown        JAMEEL (acute kidney injury) ([facility] ICD-10-CM: N17.9  ICD-9-CM: 621. 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  Widely patent LCX stents, 30-40% prox LAD stenosis with widely paten mid vessel stent. RCA 50% ostial, 75%prox,           70% mid and 90% prox GURWINDER. 4 by 20mm ostial queta with overlapping 4 by 32mm queta in prox/mid vessel. 2.5 by 18mm queta to prox GURWINDER.    6/12 normal carotid dopplers  9/14 bilateral 10-49% carotid stenoses  2/13 normal stress cardiolyte lvef 65%  1/14 normal stress echo  10/14 normal stress echo               Familial hypercholesterolemia ICD-10-CM: E78.01  ICD-9-CM: 272.0  9/4/2015              Lashawn Huston returns to the [address] for management of elevated liver enzymes. The active problem list, all pertinent past medical history, medications, radiologic findings and laboratory findings related to the liver disorder were reviewed with the patient. The patient is a 49 y.o.  female who was found to have elevated liver transaminases in 2019. The patient has a history of familial hypercholesterolemia with CAD and had a MI at the age of 41. She reports having genetic testing, results unavailable. Serologic evaluation for markers of chronic liver disease were negative for A1A, HCV antibody and acute hepatitis B. Ultrasound of the liver shows a normal appearing liver. Assessment of liver fibrosis was performed with Fibroscan 12/2019. The result was 5.4 kPa which  correlates with no fibrosis. The CAP score of 227 suggests NO hepatic steatosis. This is the first office visit for follow-up since 2019. She continues regular follow-up with cardiology every 6 months. Laboratory testing was performed by an outside provider 8/2021 which demonstrated normal liver enzymes. The patient has the following symptoms which are thought to be due to the liver disease: fatigue. The patient has not experienced the following symptoms: pain in the right side over the liver.      The patient has limitations in functional activities which can be attributed to other medical problems not related to the liver disease. ASSESSMENT AND PLAN:  Elevated liver enzymes  Intermittent elevation in liver enzymes with no fibrosis. The patient has a history of familial hypercholesterolemia currently on rosuvastatin 40 mg, fenofibrate 160 mg and zetia 10 mg. Have performed laboratory testing to monitor liver function and degree of liver injury. This included BMP, hepatic panel, CBC with platelet count and INR. Laboratory testing ordered by outside provider from 8/19/2021 reviewed in detail. Follow-up testing ordered today. The liver transaminases, ALP, liver function and platelet count are normal.     Assessment of liver fibrosis was performed with Fibroscan 12/2019. The result was 5.4 kPa which correlates with no fibrosis. The CAP score of 227 suggests NO hepatic steatosis. Based upon laboratory studies, FibroScan and imaging, the patient does not appear to have advanced liver disease. Serologic testing for causes of chronic liver disease were negative for A1A, HCV antibody and acute hepatitis B. The Fibroscan can be repeated annually or as often as clinically indicated to assess for fibrosis progression and/or regression. This will be done at the next office visit. Will continue ongoing monitoring. Discussed weight loss strategies which is also beneficial for heart health. Screening for hepatocellular carcinoma  HCC screening is not necessary if the patient has no evidence of cirrhosis. Treatment of other medical problems in patients with chronic liver disease  There are no contraindications for the patient to take most medications necessary for treatment of other medical issues. The patient can take any medications utilized for treatment of DM and/or statins to treat hypercholesterolemia. The patient does not consume alcohol on a daily basis. Normal doses of acetaminophen, as recommended on the label of the bottle, are not hepatotoxic except in the setting of daily alcohol use.  Even patients with cirrhosis can utilize acetaminophen for pain. Counseling for alcohol in patients with chronic liver disease  The patient was counseled regarding alcohol consumption and the effect of alcohol on chronic liver disease. The patient does not consume any significant amount of alcohol. Vaccinations   The need for vaccination against viral hepatitis A and B will be assessed with serologic and instituted as appropriate. Routine vaccinations against other bacterial and viral agents can be performed as indicated. Annual flu vaccination should be administered if indicated. ALLERGIES  Allergies   Allergen Reactions    Demerol [Meperidine] Other (comments)     Hallucinations     Erythromycin Anaphylaxis     MEDICATIONS  Current Outpatient Medications   Medication Sig    isosorbide mononitrate ER (IMDUR) 30 mg tablet TAKE 1 TABLET BY MOUTH EVERY DAY    citalopram (CELEXA) 40 mg tablet Take 40 mg by mouth as needed.  furosemide (LASIX) 20 mg tablet TAKE 1 TAB BY MOUTH EVERY DAY AS NEEDED FOR EDEMA (TAKE ADDITIONAL TAB OF POTASSIUM WHEN USING THIS)    Klor-Con M20 20 mEq tablet Take 20 mEq by mouth daily. Taking 10 meq daily    nitroglycerin (NITROSTAT) 0.4 mg SL tablet 0.4 mg by SubLINGual route.  metoprolol tartrate (LOPRESSOR) 25 mg tablet Take 25 mg by mouth daily.  mupirocin (BACTROBAN) 2 % ointment Apply  to affected area daily. (Patient taking differently: Apply  to affected area daily as needed.)    meclizine (ANTIVERT) 25 mg tablet Take 25 mg by mouth three (3) times daily as needed for Dizziness.  diclofenac (VOLTAREN) 1 % gel APPLY 1 GRAM TO AFFECTED AREA 3 TIMES A DAY AS NEEDED    ondansetron (ZOFRAN ODT) 8 mg disintegrating tablet Take 1 Tab by mouth every eight (8) hours as needed for Nausea.  pantoprazole (PROTONIX) 40 mg tablet Take 40 mg by mouth daily.  ALPRAZolam (XANAX) 1 mg tablet Take 1 mg by mouth three (3) times daily as needed for Anxiety.     ezetimibe (ZETIA) 10 mg tablet Take 10 mg by mouth daily.  aspirin delayed-release 81 mg tablet TAKE 2 TABLETS BY MOUTH DAILY    SOTALOL HCL (SOTALOL PO) Take 60 mg by mouth two (2) times a day.  temazepam (RESTORIL) 30 mg capsule Take 30 mg by mouth nightly as needed for Sleep.  rosuvastatin (CRESTOR) 40 mg tablet Take 40 mg by mouth nightly.  fenofibrate (LOFIBRA) 160 mg tablet Take 1 Tab by mouth daily.  lisinopril (PRINIVIL, ZESTRIL) 5 mg tablet Take 1 Tab by mouth daily. (Patient taking differently: Take 2.5 mg by mouth daily.)    clopidogrel (PLAVIX) 75 mg tablet Take 1 Tab by mouth daily. No current facility-administered medications for this visit. SYSTEM REVIEW NOT RELATED TO LIVER DISEASE OR REVIEWED ABOVE:  Constitution systems: Negative for fever, chills, weight gain, weight loss. Eyes: Negative for visual changes. ENT: Negative for sore throat, painful swallowing. Respiratory: Negative for cough, hemoptysis, SOB. Cardiology: Negative for chest pain, palpitations. GI:  Negative for constipation or diarrhea. : Negative for urinary frequency, dysuria, hematuria, nocturia. Skin: Negative for rash. Hematology: Negative for easy bruising, blood clots. Musculo-skeletal: Negative for back pain, muscle pain, weakness. Neurologic: Negative for headaches, dizziness, vertigo, memory problems not related to HE. Psychology: Negative for anxiety, depression. FAMILY HISTORY:  The father has/had the following chronic disease(s): CABG. The mother has/had the following chronic disease(s): Crohn's disease  There is no family history of liver disease. SOCIAL HISTORY:  The patient is . The patient has 3 children. The patient has never used tobacco products. The patient has never consumed significant amounts of alcohol. The patient used to work as . The patient has not worked since 2013.       PHYSICAL EXAMINATION:  Visit Vitals  /67 (BP 1 Location: Right upper arm, BP Patient Position: Sitting, BP Cuff Size: Adult)   Pulse 60   Temp 96.8 °F (36 °C) (Temporal)   Resp 16   Ht 5' 3\" (1.6 m)   Wt 185 lb (83.9 kg)   SpO2 100%   BMI 32.77 kg/m²       General: No acute distress. Eyes: Sclera anicteric. ENT: No oral lesions. Thyroid normal.  Nodes: No adenopathy. Skin: No spider angiomata. No jaundice. No palmar erythema. Respiratory: Lungs clear to auscultation. Cardiovascular: Regular heart rate. No murmurs. No JVD. Abdomen: Soft non-tender, liver size normal to percussion/palpation. Spleen not palpable. No obvious ascites. Extremities: No edema. No muscle wasting. No gross arthritic changes. Neurologic: Alert and oriented. Cranial nerves grossly intact. No asterixis. LABORATORY STUDIES:  [place] [address] Units 1/11/2022   AST 15 - 37 U/L 25   ALT 12 - 78 U/L 33   Alk Phos 45 - 117 U/L 72   Bili, Total 0.2 - 1.0 MG/DL 0.5   Bili, Direct 0.0 - 0.2 MG/DL 0.2   Albumin 3.5 - 5.0 g/dL 4.1   BUN 6 - 20 MG/DL 11   Creat 0.55 - 1.02 MG/DL 0.89   Na 136 - 145 mmol/L 143   K 3.5 - 5.1 mmol/L 4.2   Cl 97 - 108 mmol/L 110 (H)   CO2 21 - 32 mmol/L 28   Glucose 65 - 100 mg/dL 88     Liver [facility] Latest Ref Rng & Units 8/19/2021 9/16/2020   WBC 3.6 - 11.0 K/uL 6.0 4.9   ANC 1.8 - 8.0 K/UL 3.0 1.7 (L)   HGB 11.5 - 16.0 g/dL 13.0 12.7    - 400 K/uL 244 222   INR 0.9 - 1.1    1.1   AST 15 - 37 U/L 24 36   ALT 12 - 78 U/L 31 45   Alk Phos 45 - 117 U/L 58 79   Bili, Total 0.2 - 1.0 MG/DL 0.5 0.4   Bili, Direct 0.0 - 0.2 MG/DL     Albumin 3.5 - 5.0 g/dL 4.2 3.6   BUN 6 - 20 MG/DL 10 14   Creat 0.55 - 1.02 MG/DL 0.80 0.87   Na 136 - 145 mmol/L 144 141   K 3.5 - 5.1 mmol/L 4.3 4.0   Cl 97 - 108 mmol/L 105 109 (H)   CO2 21 - 32 mmol/L 28 24   Glucose 65 - 100 mg/dL 93 102 (H)   Magnesium 1.6 - 2.4 mg/dL  2.2     Laboratory testing from 8/19/2021 reviewed in detail.  Additional testing included to evaluate progression or regression of disease. Laboratory testing results from today will be communicated by My Chart. SEROLOGIES:  [facility] Latest Ref Rng & Units 11/1/2019   Hep B Surface Ag Negative Negative   Hep C Ab 0.0 - 0.9 s/co ratio <0.1   Alpha-1 antitrypsin level 101 - 187 mg/dL 135     LIVER HISTOLOGY:  12/2019. FibroScan performed at [address]. EkPa was 5.4. IQR/med 9%. . The results suggested a fibrosis level of F0. The CAP score suggests no evidence of fatty liver. ENDOSCOPIC PROCEDURES:  Not available or performed    RADIOLOGY:  11/2019. RUQ ultrasound. Normal appearing liver. OTHER TESTING:  Not available or performed    FOLLOW-UP:  All of the issues listed above in the Assessment and Plan were discussed with the patient. All questions were answered. The patient expressed a clear understanding of the above. [address] [address] in 2 months for a Fibroscan. April SALEXIS AbernathyNP-BC  [facility] 13 of [address] [address] [place] [place], [address] [place] [address] Winnie Vera  22.  201 [facility]

## 2022-01-19 ENCOUNTER — TELEPHONE (OUTPATIENT)
Dept: HEMATOLOGY | Age: 57
End: 2022-01-19

## 2022-01-19 NOTE — TELEPHONE ENCOUNTER
[email or web address] Mychart message sent to patient from April,NP--patient never respond back concerning recent lab results. Spoke w/patient she was in the hospital x3 days related to heart issues and did not see the message. I have reviewed labs with patient at this time labs are within normal limits. Patient verbalize understand and happy with lab results. (KF)

## 2022-02-10 ENCOUNTER — DOCUMENTATION ONLY (OUTPATIENT)
Dept: CARDIOLOGY CLINIC | Age: 57
End: 2022-02-10

## 2022-02-10 NOTE — PROGRESS NOTES
MRI at [facility] 2/4/22  LVEF 63%  Scar in basal to mid inferolateral wall consistent with MI on this report    This is different from nuclear stress test in august 2021 ( no infarct)    I saw her last October 2021    I do not know if there was MI between October 2021-Feb 2022    She is seen at [facility]?     Future Appointments   Date Time Provider Sheela Marie   3/11/2022 10:20 AM Jenn Valdez NP L[facility] BS AMB

## 2022-02-24 ENCOUNTER — HOSPITAL ENCOUNTER (EMERGENCY)
Age: 57
Discharge: HOME | End: 2022-02-24
Attending: EMERGENCY MEDICINE
Payer: COMMERCIAL

## 2022-02-24 ENCOUNTER — APPOINTMENT (OUTPATIENT)
Dept: GENERAL RADIOLOGY | Age: 57
End: 2022-02-24
Attending: EMERGENCY MEDICINE
Payer: COMMERCIAL

## 2022-02-24 VITALS
SYSTOLIC BLOOD PRESSURE: 154 MMHG | HEART RATE: 65 BPM | DIASTOLIC BLOOD PRESSURE: 75 MMHG | RESPIRATION RATE: 16 BRPM | BODY MASS INDEX: 30.48 KG/M2 | WEIGHT: 172 LBS | HEIGHT: 63 IN | OXYGEN SATURATION: 100 % | TEMPERATURE: 98 F

## 2022-02-24 DIAGNOSIS — R07.9 CHEST PAIN, UNSPECIFIED TYPE: Primary | ICD-10-CM

## 2022-02-24 LAB
ALBUMIN SERPL-MCNC: 4.1 G/DL (ref 3.5–5)
ALBUMIN/GLOB SERPL: 1.1 {RATIO} (ref 1.1–2.2)
ALP SERPL-CCNC: 68 U/L (ref 45–117)
ALT SERPL-CCNC: 28 U/L (ref 12–78)
ANION GAP SERPL CALC-SCNC: 6 MMOL/L (ref 5–15)
AST SERPL-CCNC: 19 U/L (ref 15–37)
BASOPHILS # BLD: 0.1 K/UL (ref 0–0.1)
BASOPHILS NFR BLD: 1 % (ref 0–1)
BILIRUB SERPL-MCNC: 0.4 MG/DL (ref 0.2–1)
BUN SERPL-MCNC: 16 MG/DL (ref 6–20)
BUN/CREAT SERPL: 19 (ref 12–20)
CALCIUM SERPL-MCNC: 9.9 MG/DL (ref 8.5–10.1)
CHLORIDE SERPL-SCNC: 109 MMOL/L (ref 97–108)
CO2 SERPL-SCNC: 25 MMOL/L (ref 21–32)
COMMENT, HOLDF: NORMAL
CREAT SERPL-MCNC: 0.86 MG/DL (ref 0.55–1.02)
DIFFERENTIAL METHOD BLD: NORMAL
EOSINOPHIL # BLD: 0.2 K/UL (ref 0–0.4)
EOSINOPHIL NFR BLD: 3 % (ref 0–7)
ERYTHROCYTE [DISTWIDTH] IN BLOOD BY AUTOMATED COUNT: 12.8 % (ref 11.5–14.5)
GLOBULIN SER CALC-MCNC: 3.7 G/DL (ref 2–4)
GLUCOSE SERPL-MCNC: 87 MG/DL (ref 65–100)
HCT VFR BLD AUTO: 40.9 % (ref 35–47)
HGB BLD-MCNC: 13.4 G/DL (ref 11.5–16)
IMM GRANULOCYTES # BLD AUTO: 0 K/UL (ref 0–0.04)
IMM GRANULOCYTES NFR BLD AUTO: 0 % (ref 0–0.5)
LYMPHOCYTES # BLD: 2.4 K/UL (ref 0.8–3.5)
LYMPHOCYTES NFR BLD: 38 % (ref 12–49)
MCH RBC QN AUTO: 30.9 PG (ref 26–34)
MCHC RBC AUTO-ENTMCNC: 32.8 G/DL (ref 30–36.5)
MCV RBC AUTO: 94.5 FL (ref 80–99)
MONOCYTES # BLD: 0.5 K/UL (ref 0–1)
MONOCYTES NFR BLD: 8 % (ref 5–13)
NEUTS SEG # BLD: 3.2 K/UL (ref 1.8–8)
NEUTS SEG NFR BLD: 50 % (ref 32–75)
NRBC # BLD: 0 K/UL (ref 0–0.01)
NRBC BLD-RTO: 0 PER 100 WBC
PLATELET # BLD AUTO: 239 K/UL (ref 150–400)
PMV BLD AUTO: 10.8 FL (ref 8.9–12.9)
POTASSIUM SERPL-SCNC: 3.7 MMOL/L (ref 3.5–5.1)
PROT SERPL-MCNC: 7.8 G/DL (ref 6.4–8.2)
RBC # BLD AUTO: 4.33 M/UL (ref 3.8–5.2)
SAMPLES BEING HELD,HOLD: NORMAL
SODIUM SERPL-SCNC: 140 MMOL/L (ref 136–145)
TROPONIN-HIGH SENSITIVITY: 7 NG/L (ref 0–51)
TROPONIN-HIGH SENSITIVITY: 7 NG/L (ref 0–51)
WBC # BLD AUTO: 6.4 K/UL (ref 3.6–11)

## 2022-02-24 PROCEDURE — 71046 X-RAY EXAM CHEST 2 VIEWS: CPT

## 2022-02-24 PROCEDURE — 84484 ASSAY OF TROPONIN QUANT: CPT

## 2022-02-24 PROCEDURE — 93005 ELECTROCARDIOGRAM TRACING: CPT

## 2022-02-24 PROCEDURE — 36415 COLL VENOUS BLD VENIPUNCTURE: CPT

## 2022-02-24 PROCEDURE — 80053 COMPREHEN METABOLIC PANEL: CPT

## 2022-02-24 PROCEDURE — 99283 EMERGENCY DEPT VISIT LOW MDM: CPT

## 2022-02-24 PROCEDURE — 85025 COMPLETE CBC W/AUTO DIFF WBC: CPT

## 2022-02-24 NOTE — ED PROVIDER NOTES
Date of Service:  22    Patient: Min Hankins    Chief Complaint:  Chest Pain       HPI:  Anita Harris is a 49 y.o.  female who presents for evaluation of chest pain. Clinching chest pain 8/10 at its worst. 1-2 times per hour lasting for a few moments. Left sided non radiating. Nausea with it. No other symptoms. Patient also notes a current headache. Patient also notes some recent PVCs and has been told she has been having some bigeminy. Recent cardiac MRI at [facility] and was told may need another cath. Sees.  Dr. Holly Perez           Past Medical History:   Diagnosis Date    CAD (coronary artery disease)     Gastrointestinal disorder     hiatal hernia    Hyperlipidemia     genetic    Hypertension     Ill-defined condition     vertigo    MI (myocardial infarction) ([address]     PVC (premature ventricular contraction)     SVT [facility]ra[facility]ricular [facility]) ([facility])        Past Surgical History:   Procedure Laterality Date    HX  SECTION      three    HX TUBAL LIGATION      SD CARDIAC SURG PROCEDURE UNLIST  12    stents x 3    SD CARDIAC SURG PROCEDURE UNLIST  10/2013    stents x 3         Family History:   Problem Relation Age of Onset    Elevated Lipids Father     Heart Disease Father 46        heart attack    Elevated Lipids Brother     Elevated Lipids Paternal Grandfather     Heart Disease Paternal Grandfather     Hypertension Paternal Grandfather     Stroke Paternal Grandfather        Social History     Socioeconomic History    Marital status: SINGLE     Spouse name: Not on file    Number of children: Not on file    Years of education: Not on file    Highest education level: Not on file   Occupational History    Not on file   Tobacco Use    Smoking status: Former Smoker     Packs/day: 2.00     Years: 30.00     Pack years: 60.00     Types: Cigarettes     Quit date: 2012     Years since quittin.7    Smokeless tobacco: Never Used   Vaping Use    Vaping Use: Never used Substance and Sexual Activity    Alcohol use: No     Alcohol/week: 0.0 standard drinks    Drug use: No    Sexual activity: Yes     Partners: Male   Other Topics Concern    Not on file   Social History Narrative    Not on file     Social Determinants of Health     Financial Resource Strain:     Difficulty of Paying Living Expenses: Not on file   Food Insecurity:     Worried About Running Out of Food in the Last Year: Not on file    Ramon of Food in the Last Year: Not on file   Transportation Needs:     Lack of Transportation (Medical): Not on file    Lack of Transportation (Non-Medical): Not on file   Physical Activity:     Days of Exercise per Week: Not on file    Minutes of Exercise per Session: Not on file   Stress:     Feeling of Stress : Not on file   Social Connections:     Frequency of Communication with Friends and Family: Not on file    Frequency of Social Gatherings with Friends and Family: Not on file    Attends Hoahaoism Services: Not on file    Active Member of [address] [place] [address] or Organizations: Not on file    Attends Club or Organization Meetings: Not on file    Marital Status: Not on file   Intimate Partner Violence:     Fear of Current or Ex-Partner: Not on file    Emotionally Abused: Not on file    Physically Abused: Not on file    Sexually Abused: Not on file   Housing Stability:     Unable to Pay for Housing in the Last Year: Not on file    Number of [place] in the Last Year: Not on file    Unstable Housing in the Last Year: Not on file         ALLERGIES: Demerol [meperidine] and Erythromycin    Review of Systems   Cardiovascular: Positive for chest pain. Neurological: Positive for headaches. All other systems reviewed and are negative. Vitals:    02/24/22 1737   BP: (!) 154/75   Pulse: 65   Resp: 16   Temp: 98 °F (36.7 °C)   SpO2: 100%   Weight: 78 kg (172 lb)   Height: 5' 3\" (1.6 m)            Physical Exam  Vitals and nursing note reviewed.    Constitutional: Appearance: She is well-developed. HENT:      Head: Normocephalic and atraumatic. Cardiovascular:      Rate and Rhythm: Normal rate and regular rhythm. Heart sounds: Murmur heard. Pulmonary:      Effort: Pulmonary effort is normal.      Breath sounds: Normal breath sounds. Chest:      Chest wall: No mass or tenderness. Abdominal:      Palpations: Abdomen is soft. Musculoskeletal:         General: Normal range of motion. Right lower leg: No edema. Left lower leg: No edema. Skin:     General: Skin is warm. Capillary Refill: Capillary refill takes less than 2 seconds. Neurological:      Mental Status: She is alert and oriented to person, place, and time. Psychiatric:         Mood and Affect: Mood normal.          MDM     VITAL SIGNS:  No data found. LABS:  Recent Results (from the past 6 hour(s))   EKG, 12 LEAD, INITIAL    Collection Time: 02/24/22  5:33 PM   Result Value Ref Range    Ventricular Rate 63 BPM    Atrial Rate 63 BPM    P-R Interval 224 ms    QRS Duration 74 ms    Q-T Interval 406 ms    QTC Calculation (Bezet) 415 ms    Calculated P Axis 23 degrees    Calculated R Axis -10 degrees    Calculated T Axis 41 degrees    Diagnosis       Sinus rhythm with 1st degree AV block with occasional premature ventricular   complexes  Low voltage QRS  Borderline ECG  When compared with ECG of 19-AUG-2021 18:48,  premature ventricular complexes are now present     SAMPLES BEING HELD    Collection Time: 02/24/22  5:58 PM   Result Value Ref Range    SAMPLES BEING HELD 1BLU, 1LAV 2PST     COMMENT        Add-on orders for these samples will be processed based on acceptable specimen integrity and analyte stability, which may vary by analyte.    CBC WITH AUTOMATED DIFF    Collection Time: 02/24/22  5:58 PM   Result Value Ref Range    WBC 6.4 3.6 - 11.0 K/uL    RBC 4.33 3.80 - 5.20 M/uL    HGB 13.4 11.5 - 16.0 g/dL    HCT 40.9 35.0 - 47.0 %    MCV 94.5 80.0 - 99.0 FL    MCH 30.9 26.0 - 34.0 PG    MCHC 32.8 30.0 - 36.5 g/dL    RDW 12.8 11.5 - 14.5 %    PLATELET 619 522 - 868 K/uL    MPV 10.8 8.9 - 12.9 FL    NRBC 0.0 0  WBC    ABSOLUTE NRBC 0.00 0.00 - 0.01 K/uL    NEUTROPHILS 50 32 - 75 %    LYMPHOCYTES 38 12 - 49 %    MONOCYTES 8 5 - 13 %    EOSINOPHILS 3 0 - 7 %    BASOPHILS 1 0 - 1 %    IMMATURE GRANULOCYTES 0 0.0 - 0.5 %    ABS. NEUTROPHILS 3.2 1.8 - 8.0 K/UL    ABS. LYMPHOCYTES 2.4 0.8 - 3.5 K/UL    ABS. MONOCYTES 0.5 0.0 - 1.0 K/UL    ABS. EOSINOPHILS 0.2 0.0 - 0.4 K/UL    ABS. BASOPHILS 0.1 0.0 - 0.1 K/UL    ABS. IMM. GRANS. 0.0 0.00 - 0.04 K/UL    DF AUTOMATED     METABOLIC PANEL, COMPREHENSIVE    Collection Time: 02/24/22  5:58 PM   Result Value Ref Range    Sodium 140 136 - 145 mmol/L    Potassium 3.7 3.5 - 5.1 mmol/L    Chloride 109 (H) 97 - 108 mmol/L    CO2 25 21 - 32 mmol/L    Anion gap 6 5 - 15 mmol/L    Glucose 87 65 - 100 mg/dL    BUN 16 6 - 20 MG/DL    Creatinine 0.86 0.55 - 1.02 MG/DL    BUN/Creatinine ratio 19 12 - 20      GFR est AA >60 >60 ml/min/1.73m2    GFR est non-AA >60 >60 ml/min/1.73m2    Calcium 9.9 8.5 - 10.1 MG/DL    Bilirubin, total 0.4 0.2 - 1.0 MG/DL    ALT (SGPT) 28 12 - 78 U/L    AST (SGOT) 19 15 - 37 U/L    Alk. phosphatase 68 45 - 117 U/L    Protein, total 7.8 6.4 - 8.2 g/dL    Albumin 4.1 3.5 - 5.0 g/dL    Globulin 3.7 2.0 - 4.0 g/dL    A-G Ratio 1.1 1.1 - 2.2     TROPONIN-HIGH SENSITIVITY    Collection Time: 02/24/22  5:58 PM   Result Value Ref Range    Troponin-High Sensitivity 7 0 - 51 ng/L   TROPONIN-HIGH SENSITIVITY    Collection Time: 02/24/22  8:22 PM   Result Value Ref Range    Troponin-High Sensitivity 7 0 - 51 ng/L        IMAGING:  XR CHEST PA LAT   Final Result    No acute cardiopulmonary disease radiographically. .  . Medications During Visit:  Medications - No data to display      DECISION MAKING:  Joanna Alvarenga is a 49 y.o. female who comes in as above. Here, patient appears well. No active chest pain.   EKG unremarkable. X-ray unremarkable. She chest troponin are unchanged at 7. Patient does have an extensive cardiac history but given the unchanged troponin and well-appearing patient with reassuring exam, patient can follow-up with her previous scheduled cardiology appointment next week. IMPRESSION:  1. Chest pain, unspecified type        DISPOSITION:  Discharged      Current Discharge Medication List           Follow-up Information     Follow up With Specialties Details Why Contact Info    Alex Oleary MD Internal Medicine Schedule an appointment as soon as possible for a visit   [facility]  [address]  [phone]      Pankaj Hurtado MD Cardiology, Clinical Cardiac Electrophysiology Schedule an appointment as soon as possible for a visit   [place] [address]  [address] [place] [address] [phone]              The patient is asked to follow-up with their primary care provider in the next several days. They are to call tomorrow for an appointment. The patient is asked to return promptly for any increased concerns or worsening of symptoms. They can return to this emergency department or any other emergency department.     Procedures

## 2022-02-24 NOTE — ED NOTES
TRIAGE: Pt arrives with c/o intermittent chest pain that started this morning. Pt took ASA 20 min ago with an episode of 8/10 clenching chest pain. Pt had cardiac MRI 2-3 weeks ago and has multiple stents. +nausea. Denies SOB.

## 2022-02-25 LAB
ATRIAL RATE: 63 BPM
CALCULATED P AXIS, ECG09: 23 DEGREES
CALCULATED R AXIS, ECG10: -10 DEGREES
CALCULATED T AXIS, ECG11: 41 DEGREES
DIAGNOSIS, 93000: NORMAL
P-R INTERVAL, ECG05: 224 MS
Q-T INTERVAL, ECG07: 406 MS
QRS DURATION, ECG06: 74 MS
QTC CALCULATION (BEZET), ECG08: 415 MS
VENTRICULAR RATE, ECG03: 63 BPM

## 2022-02-25 NOTE — ED NOTES
Pt verbalizes understanding DC instructions and offered time for questions.  PIV removed, bleeding controlled,

## 2022-03-11 ENCOUNTER — OFFICE VISIT (OUTPATIENT)
Dept: HEMATOLOGY | Age: 57
End: 2022-03-11
Payer: COMMERCIAL

## 2022-03-11 VITALS
HEART RATE: 64 BPM | RESPIRATION RATE: 17 BRPM | BODY MASS INDEX: 31.22 KG/M2 | TEMPERATURE: 96.9 F | SYSTOLIC BLOOD PRESSURE: 108 MMHG | WEIGHT: 176.2 LBS | DIASTOLIC BLOOD PRESSURE: 66 MMHG | HEIGHT: 63 IN | OXYGEN SATURATION: 99 %

## 2022-03-11 DIAGNOSIS — R74.8 ELEVATED LIVER ENZYMES: Primary | ICD-10-CM

## 2022-03-11 PROCEDURE — 99213 OFFICE O/P EST LOW 20 MIN: CPT | Performed by: NURSE PRACTITIONER

## 2022-03-11 PROCEDURE — 91200 LIVER ELASTOGRAPHY: CPT | Performed by: NURSE PRACTITIONER

## 2022-03-11 RX ORDER — LIDOCAINE 50 MG/G
PATCH TOPICAL
COMMUNITY
End: 2022-03-17

## 2022-03-11 RX ORDER — KETOCONAZOLE 20 MG/G
CREAM TOPICAL
COMMUNITY
Start: 2022-03-08

## 2022-03-11 NOTE — PROGRESS NOTES
[address] [facility], MD, Lindsey Chand MD Sybil Earnest, PA-C Ransom Halsted, ACNP-BC     Jenn Valdez, [facility]   Susu Duong FNP-GUERO [facility]       Barbara Mejia [facility] 136    at [address], [address] [place] [address], [facility] 22.    [phone]    FAX: [address] [place] [address], [address], [address]    [phone]    FAX: [phone]     Patient Care Team:  Shaye Crockett MD as PCP - General (Internal Medicine)  Heriberto Torres MD as Physician (Gastroenterology)  Nicole Alaniz MD (Cardiology)  Dima العراقي MD (Cardiology)  Marceilna Elkins MD (Cardiology)  Yolanda Cleveland MD (Cardio Vascular Surgery)    Problem List  Date Reviewed: 1/12/2022          Codes Class Noted    Chest pain ICD-10-CM: R07.9  ICD-9-CM: 786.50  8/19/2021        Hypertension ICD-10-CM: I10  ICD-9-CM: 401.9  Unknown        JAMEEL (acute kidney [facility]) ([facility] ICD-10-CM: N17.9  ICD-9-CM: 994. 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 Widely patent LCX stents, 30-40% prox LAD stenosis with widely paten mid vessel stent. RCA 50% ostial, 75%prox,           70% mid and 90% prox GURWINDER. 4 by 20mm ostial queta with overlapping 4 by 32mm queta in prox/mid vessel. 2.5 by 18mm queta to prox GURWINDER.    6/12 normal carotid dopplers  9/14 bilateral 10-49% carotid stenoses  2/13 normal stress cardiolyte lvef 65%  1/14 normal stress echo  10/14 normal stress echo               Familial hypercholesterolemia ICD-10-CM: E78.01  ICD-9-CM: 272.0  9/4/2015                Lashawn Hust[facility] for management of elevated liver enzymes. The active problem list, all pertinent past medical history, medications, radiologic findings and laboratory findings related to the liver disorder were reviewed with the patient. The patient is a 49 y.o.  female who was found to have elevated liver transaminases in 2019. The patient has a history of familial hypercholesterolemia with CAD and had a MI at the age of 41. She reports having genetic testing, results unavailable. Serologic evaluation for markers of chronic liver disease were negative for A1A, HCV antibody and acute hepatitis B. Ultrasound of the liver shows a normal appearing liver. Assessment of liver fibrosis was performed with Fibroscan 12/2019. The result was 5.4 kPa which  correlates with no fibrosis. The CAP score of 227 suggests NO hepatic steatosis. She returns for Fibroscan today. Follow-up was lost from 2019 to 2021. She continues regular follow-up with cardiology every 6 months. Laboratory testing was performed by an outside provider 8/2021 which demonstrated normal liver enzymes. Since the last office visit the patient developed chest pain which prompted and ER visit 2/2022. She ins now having PVC's. Cardiac MRI was recently performed which shows normal LVEF of 63%.      The patient has the following symptoms which are thought to be due to the liver disease: fatigue. The patient has not experienced the following symptoms: pain in the right side over the liver. The patient has limitations in functional activities which can be attributed to other medical problems not related to the liver disease. ASSESSMENT AND PLAN:  Elevated liver enzymes  Intermittent elevation in liver enzymes with no fibrosis. The patient has a history of familial hypercholesterolemia currently on rosuvastatin 40 mg, fenofibrate 160 mg and zetia 10 mg. Assessment of liver fibrosis was performed with Fibroscan 12/2019. The result was 5.4 kPa which correlates with no fibrosis. The CAP score of 227 suggests NO hepatic steatosis. Assessment of liver fibrosis was performed with Fibroscan in the office today. The result was 3.0 kPa which correlates with no fibrosis. The CAP score of 234 suggests NO hepatic steatosis. Laboratory testing performed during and ER visit 2/24/2022 reviewed in detail. The liver transaminases, ALP, liver function and platelet count are normal.     Serologic testing for causes of chronic liver disease were negative for A1A, HCV antibody and acute hepatitis B. The Fibroscan can be repeated annually or as often as clinically indicated to assess for fibrosis progression and/or regression. Will continue ongoing monitoring. Discussed weight loss strategies which is also beneficial for heart health. Screening for hepatocellular carcinoma  HCC screening is not necessary if the patient has no evidence of cirrhosis. Treatment of other medical problems in patients with chronic liver disease  There are no contraindications for the patient to take most medications necessary for treatment of other medical issues. The patient can take any medications utilized for treatment of DM and/or statins to treat hypercholesterolemia. The patient does not consume alcohol on a daily basis.  Normal doses of acetaminophen, as recommended on the label of the bottle, are not hepatotoxic except in the setting of daily alcohol use. Even patients with cirrhosis can utilize acetaminophen for pain. Counseling for alcohol in patients with chronic liver disease  The patient was counseled regarding alcohol consumption and the effect of alcohol on chronic liver disease. The patient does not consume any significant amount of alcohol. Vaccinations   The need for vaccination against viral hepatitis A and B will be assessed with serologic and instituted as appropriate. Routine vaccinations against other bacterial and viral agents can be performed as indicated. Annual flu vaccination should be administered if indicated. ALLERGIES  Allergies   Allergen Reactions    Demerol [Meperidine] Other (comments)     Hallucinations     Erythromycin Anaphylaxis    Sulfadiazine Itching     Reaction Type: Allergy     MEDICATIONS  Current Outpatient Medications   Medication Sig    ketoconazole (NIZORAL) 2 % topical cream 1 APPLICATION TO AFFECTED AREAS EXTERNALLY TWICE A DAY    isosorbide mononitrate ER (IMDUR) 30 mg tablet TAKE 1 TABLET BY MOUTH EVERY DAY    citalopram (CELEXA) 40 mg tablet Take 40 mg by mouth as needed.  furosemide (LASIX) 20 mg tablet TAKE 1 TAB BY MOUTH EVERY DAY AS NEEDED FOR EDEMA (TAKE ADDITIONAL TAB OF POTASSIUM WHEN USING THIS)    Klor-Con M20 20 mEq tablet Take 20 mEq by mouth daily. Taking 10 meq daily    nitroglycerin (NITROSTAT) 0.4 mg SL tablet 0.4 mg by SubLINGual route.  metoprolol tartrate (LOPRESSOR) 25 mg tablet Take 25 mg by mouth daily.  mupirocin (BACTROBAN) 2 % ointment Apply  to affected area daily. (Patient taking differently: Apply  to affected area daily as needed.)    meclizine (ANTIVERT) 25 mg tablet Take 25 mg by mouth three (3) times daily as needed for Dizziness.     diclofenac (VOLTAREN) 1 % gel APPLY 1 GRAM TO AFFECTED AREA 3 TIMES A DAY AS NEEDED    ondansetron (ZOFRAN ODT) 8 mg disintegrating tablet Take 1 Tab by mouth every eight (8) hours as needed for Nausea.  pantoprazole (PROTONIX) 40 mg tablet Take 40 mg by mouth daily.  ALPRAZolam (XANAX) 1 mg tablet Take 1 mg by mouth three (3) times daily as needed for Anxiety.  ezetimibe (ZETIA) 10 mg tablet Take 10 mg by mouth daily.  aspirin delayed-release 81 mg tablet TAKE 2 TABLETS BY MOUTH DAILY    SOTALOL HCL (SOTALOL PO) Take 60 mg by mouth two (2) times a day.  temazepam (RESTORIL) 30 mg capsule Take 30 mg by mouth nightly as needed for Sleep.  rosuvastatin (CRESTOR) 40 mg tablet Take 40 mg by mouth nightly.  fenofibrate (LOFIBRA) 160 mg tablet Take 1 Tab by mouth daily.  lisinopril (PRINIVIL, ZESTRIL) 5 mg tablet Take 1 Tab by mouth daily. (Patient taking differently: Take 2.5 mg by mouth daily.)    clopidogrel (PLAVIX) 75 mg tablet Take 1 Tab by mouth daily.  lidocaine (LIDODERM) 5 % lidocaine 5 % topical patch (Patient not taking: Reported on 3/11/2022)     No current facility-administered medications for this visit. SYSTEM REVIEW NOT RELATED TO LIVER DISEASE OR REVIEWED ABOVE:  Constitution systems: Negative for fever, chills, weight gain, weight loss. Eyes: Negative for visual changes. ENT: Negative for sore throat, painful swallowing. Respiratory: Negative for cough, hemoptysis, SOB. Cardiology: Negative for chest pain, palpitations. GI:  Negative for constipation or diarrhea. : Negative for urinary frequency, dysuria, hematuria, nocturia. Skin: Negative for rash. Hematology: Negative for easy bruising, blood clots. Musculo-skeletal: Negative for back pain, muscle pain, weakness. Neurologic: Negative for headaches, dizziness, vertigo, memory problems not related to HE. Psychology: Negative for anxiety, depression. FAMILY HISTORY:  The father has/had the following chronic disease(s): CABG.     The mother has/had the following chronic disease(s): Crohn's disease  There is no family history of liver disease. SOCIAL HISTORY:  The patient is . The patient has 3 children. The patient has never used tobacco products. The patient has never consumed significant amounts of alcohol. The patient used to work as . The patient has not worked since 2013. PHYSICAL EXAMINATION:  Visit Vitals  /66   Pulse 64   Temp 96.9 °F (36.1 °C) (Temporal)   Resp 17   Ht 5' 3\" (1.6 m)   Wt 176 lb 3.2 oz (79.9 kg)   SpO2 99%   BMI 31.21 kg/m²       General: No acute distress. Eyes: Sclera anicteric. ENT: No oral lesions. Thyroid normal.  Nodes: No adenopathy. Skin: No spider angiomata. No jaundice. No palmar erythema. Abdomen: Soft non-tender, liver size normal to percussion/palpation. Spleen not palpable. No obvious ascites. Extremities: No edema. No muscle wasting. No gross arthritic changes. Neurologic: Alert and oriented. Cranial nerves grossly intact. No asterixis. LABORATORY STUDIES:  [facility] [address] & Units 2/24/2022 1/11/2022   WBC 3.6 - 11.0 K/uL 6.4    ANC 1.8 - 8.0 K/UL 3.2    HGB 11.5 - 16.0 g/dL 13.4     - 400 K/uL 239    INR 0.9 - 1.1       AST 15 - 37 U/L 19 25   ALT 12 - 78 U/L 28 33   Alk Phos 45 - 117 U/L 68 72   Bili, Total 0.2 - 1.0 MG/DL 0.4 0.5   Bili, Direct 0.0 - 0.2 MG/DL  0.2   Albumin 3.5 - 5.0 g/dL 4.1 4.1   BUN 6 - 20 MG/DL 16 11   Creat 0.55 - 1.02 MG/DL 0.86 0.89   Na 136 - 145 mmol/L 140 143   K 3.5 - 5.1 mmol/L 3.7 4.2   Cl 97 - 108 mmol/L 109 (H) 110 (H)   CO2 21 - 32 mmol/L 25 28   Glucose 65 - 100 mg/dL 87 88     Laboratory testing from 2/24/2022 reviewed in detail. Additional testing included to evaluate progression or regression of disease. SEROLOGIES:  Serologies Latest Ref Rng & Units 11/1/2019   Hep B Surface Ag Negative Negative   Hep C Ab 0.0 - 0.9 s/co ratio <0.1   Alpha-1 antitrypsin level 101 - 187 mg/dL 135     LIVER HISTOLOGY:  12/2019.  FibroScan performed at [place] [address] of [place]. EkPa was 5.4. IQR/med 9%. . The results suggested a fibrosis level of F0. The CAP score suggests no evidence of fatty liver. 3/2022. Fib[facility] performed at [facility]. EkPa was 3.0. IQR/med 7%. . The results suggested a fibrosis level of F0. The CAP score suggests there is no significant hepatic steatosis. ENDOSCOPIC PROCEDURES:  Not available or performed    RADIOLOGY:  11/2019. RUQ ultrasound. Normal appearing liver. OTHER TESTING:  Not available or performed    FOLLOW-UP:  All of the issues listed above in the Assessment and Plan were discussed with the patient. All questions were answered. The patient expressed a clear understanding of the above. Follow-up Liver Economy [address] in 6 months for routine monitoring. April SUNNY [facility] of [address] [id number] Aaron Vera, [address] [place] [address] 22.  [facility]

## 2022-03-11 NOTE — PROGRESS NOTES
Identified pt with two pt identifiers(name and ). Reviewed record in preparation for visit and have obtained necessary documentation. Chief Complaint   Patient presents with    Fatty Liver     2 mo Fibroscan f/u      Vitals:    22 0951 22 1008   BP: (!) 96/50 108/66   Pulse: 64    Resp: 17    Temp: 96.9 °F (36.1 °C)    TempSrc: Temporal    SpO2: 99%    Weight: 176 lb 3.2 oz (79.9 kg)    Height: 5' 3\" (1.6 m)    PainSc:   0 - No pain      Depression alert and feeling very dizzy   Health Maintenance Review: Patient reminded of \"due or due soon\" health maintenance. I have asked the patient to contact his/her primary care provider (PCP) for follow-up on his/her health maintenance. Coordination of Care Questionnaire:  :   1) Have you been to an emergency room, urgent care, or hospitalized since your last visit? If yes, where when, and reason for visit? Yes, 22, [facility], chest pain      2. Have seen or consulted any other health care provider since your last visit? If yes, where when, and reason for visit? YES, f/u with Cardiologist      Patient is accompanied by self I have received verbal consent from Manuel Kidd to discuss any/all medical information while they are present in the room.

## 2022-03-17 ENCOUNTER — OFFICE VISIT (OUTPATIENT)
Dept: CARDIOLOGY CLINIC | Age: 57
End: 2022-03-17
Payer: COMMERCIAL

## 2022-03-17 VITALS
DIASTOLIC BLOOD PRESSURE: 82 MMHG | OXYGEN SATURATION: 99 % | SYSTOLIC BLOOD PRESSURE: 112 MMHG | HEART RATE: 73 BPM | HEIGHT: 63 IN | BODY MASS INDEX: 32.6 KG/M2 | WEIGHT: 184 LBS

## 2022-03-17 DIAGNOSIS — I49.3 PVC (PREMATURE VENTRICULAR CONTRACTION): Primary | ICD-10-CM

## 2022-03-17 DIAGNOSIS — I47.29 NSVT (NONSUSTAINED VENTRICULAR TACHYCARDIA): ICD-10-CM

## 2022-03-17 DIAGNOSIS — E78.01 FAMILIAL HYPERCHOLESTEROLEMIA: ICD-10-CM

## 2022-03-17 DIAGNOSIS — Z95.5 S/P DRUG ELUTING CORONARY STENT PLACEMENT: ICD-10-CM

## 2022-03-17 PROCEDURE — 99214 OFFICE O/P EST MOD 30 MIN: CPT | Performed by: INTERNAL MEDICINE

## 2022-03-17 NOTE — PROGRESS NOTES
Cardiac Electrophysiology OFFICE Progress Note     Subjective:       Juanpablo Zamorano is a 49 y.o. . patient who is seen for evaluation of chest pain and PVCs, NSVT.       She had recent cardiac MRI at [facility], showed LVEF 63% with scar in basal to mid inferolat wall consistent with MI.  This differs from nuclear stress test in 08/2021 (no infarct).  LVEF WNL. She continues to note frequent PVCs, states they have increased over the past few months, notes intermittent ventricular bigeminy.  When very frequent, she notes diaphoresis, lightheadedness, & difficulty breathing. On sotalol.  ECG on 02/24/2022 showed sinus rhythm 63 bpm with 1st degree AVB & occasional PVCs; QTc 415 ms. BP controlled. Continues ASA & Plavix, voices concern that it may not be working as intended. Previous:   ER visit for chest pain 02/24/2022. S/p ablation x 2 by Dr. Stephane Johnson at [facility] ablations were very painful. S/p MI, PCI in 2017. Patient Active Problem List      · Carotid artery disease without cerebral infarction (HCC) I77.9   · CAD (coronary artery disease) I25.10   · Familial hypercholesterolemia E78.01   · Generalized anxiety disorder F41.1   · Vocal cord nodules J38.2   · GERD (gastroesophageal reflux disease) K21.9   · S/P coronary artery stent placement Z95.5   · Acute chest pain R07.9   · Hypertension I10   · JAMEEL (acute kidney injury) ([facility] N17.9   · Chest pain R07.9     Current Outpatient Medications   Medication Sig Dispense Refill   · ketoconazole (NIZORAL) 2 % topical cream 1 APPLICATION TO AFFECTED AREAS EXTERNALLY TWICE A DAY   · isosorbide mononitrate ER (IMDUR) 30 mg tablet TAKE 1 TABLET BY MOUTH EVERY DAY 30 Tablet 5   · citalopram (CELEXA) 40 mg tablet Take 40 mg by mouth as needed. · furosemide (LASIX) 20 mg tablet TAKE 1 TAB BY MOUTH EVERY DAY AS NEEDED FOR EDEMA (TAKE ADDITIONAL TAB OF POTASSIUM WHEN USING THIS)   · Klor-Con M20 20 mEq tablet Take 20 mEq by mouth daily.  Taking 10 meq daily   · nitroglycerin (NITROSTAT) 0.4 mg SL tablet 0.4 mg by SubLINGual route. · metoprolol tartrate (LOPRESSOR) 25 mg tablet Take 25 mg by mouth daily. · mupirocin (BACTROBAN) 2 % ointment Apply  to affected area daily. (Patient taking differently: Apply  to affected area daily as needed.) 22 g 0   · meclizine (ANTIVERT) 25 mg tablet Take 25 mg by mouth three (3) times daily as needed for Dizziness. · diclofenac (VOLTAREN) 1 % gel APPLY 1 GRAM TO AFFECTED AREA 3 TIMES A DAY AS NEEDED 1   · ondansetron (ZOFRAN ODT) 8 mg disintegrating tablet Take 1 Tab by mouth every eight (8) hours as needed for Nausea. 15 Tab 0   · pantoprazole (PROTONIX) 40 mg tablet Take 40 mg by mouth daily. · ALPRAZolam (XANAX) 1 mg tablet Take 1 mg by mouth three (3) times daily as needed for Anxiety. · ezetimibe (ZETIA) 10 mg tablet Take 10 mg by mouth daily. · aspirin delayed-release 81 mg tablet TAKE 2 TABLETS BY MOUTH DAILY 60 Tab 5   · SOTALOL HCL (SOTALOL PO) Take 60 mg by mouth two (2) times a day. · temazepam (RESTORIL) 30 mg capsule Take 30 mg by mouth nightly as needed for Sleep. · rosuvastatin (CRESTOR) 40 mg tablet Take 40 mg by mouth nightly. · fenofibrate (LOFIBRA) 160 mg tablet Take 1 Tab by mouth daily. 90 Tab 2   · lisinopril (PRINIVIL, ZESTRIL) 5 mg tablet Take 1 Tab by mouth daily. (Patient taking differently: Take 2.5 mg by mouth daily.) 90 Tab 3   · clopidogrel (PLAVIX) 75 mg tablet Take 1 Tab by mouth daily. 90 Tab 3   · lidocaine (LIDODERM) 5 % lidocaine 5 % topical patch (Patient not taking: Reported on 3/11/2022)     Allergies   Allergen Reactions   · Demerol [Meperidine] Other (comments)   Hallucinations   · Erythromycin Anaphylaxis   · Sulfadiazine Itching   Reaction Type:  Allergy     Past Medical History:   Diagnosis Date   · Bigeminal rhythm 09/2021   · CAD (coronary artery disease)   · Gastrointestinal disorder   hiatal hernia   · Hyperlipidemia   genetic   · Hypertension   · Ill-defined condition   vertigo   · [facility]ocardial infarction) ([facility])   · PVC (premature ventricular contraction)   · SVT (supraventricular tachycardia) ([facility])     Past Surgical History:   Procedure Laterality Date   · HX  SECTION   three   · HX TUBAL LIGATION   · NM CARDIAC SURG PROCEDURE UNLIST 12   stents x 3   · NM CARDIAC SURG PROCEDURE UNLIST 10/2013   stents x 3     Family History   Problem Relation Age of Onset   · Elevated Lipids Father   · Heart Disease Father 46      heart attack   · Elevated Lipids Brother   · Elevated Lipids Paternal Grandfather   · Heart Disease Paternal Grandfather   · Hypertension Paternal Grandfather   · Stroke Paternal Grandfather     Social History     Tobacco Use   · Smoking status: Former Smoker   Packs/day: 2.00   Years: 30.00   Pack years: 60.00   Types: Cigarettes   Quit date: 2012   Years since quittin.7   · Smokeless tobacco: Never Used   Substance Use Topics   · Alcohol use: No   Alcohol/week: 0.0 standard drinks         Review of Systems:   Constitutional: Negative for fever, chills, weight loss, + malaise/fatigue. HEENT: Negative for nosebleeds, vision changes. Respiratory: Negative for cough, hemoptysis   Cardiovascular: Negative for orthopnea, claudication, leg swelling, syncope, and PND. + chest pain, palpitations, intermittent SOB, lightheadedness   Gastrointestinal: Negative for nausea, vomiting, diarrhea, blood in stool and melena. Genitourinary: Negative for dysuria, and hematuria. Musculoskeletal: Negative for myalgias, arthralgia. Skin: Negative for rash. Heme: Does not bleed or bruise easily. Neurological: Negative for speech change and focal weakness.       Objective:   Visit Vitals  /82   Pulse 73   Ht 5' 3\" (1.6 m)   Wt 184 lb (83.5 kg)   SpO2 99%   BMI 32.59 kg/m²         Physical Exam:   Constitutional: Well-developed and well-nourished. No respiratory distress. Head: Normocephalic and atraumatic.    Eyes: Pupils are equal, round.   ENT: Hearing grossly normal.   Neck: Supple. No JVD present. Cardiovascular: Normal rate, regular rhythm. Exam reveals no gallop and no friction rub. No murmur heard. Pulmonary/Chest: Effort normal and breath sounds normal. No wheezes. Abdominal: Soft, no tenderness. + mild obesity   Musculoskeletal: No edema. Neurological: Alert,oriented. Skin: Skin is warm and dry. Psychiatric: Normal mood and affect. Behavior is normal. Judgment and thought content normal.             Assessment/Plan:     Imaging/Studies:   Cardiac MRI (02/04/2022): LVEF 63%, minimal decreased thickening seen along basilar inferolat wall.  Subendocardial based enhancement in inferolat wall from base to mid segment of LV (7% LV myocardium).  Native myocardial native T1 values elevated (1500 ms) in this region.  Native myocardial T2 values in this region normal, suggest against an acute process.  Ischemic pattern of enhancement in a coronary distribution is consistent with myocardial infarct.       Lexiscan cardiolite stress (08/20/2021): LVEF 67%.  No definite evidence of ischemia and/or infarction. Echo (09/16/2020): LVEF 55-60%, grade 1 LV diastolic dysfunction. ICD-10-CM ICD-9-CM    1. PVC (premature ventricular contraction)  I49.3 427.69    2. NSVT (nonsustained ventricular tachycardia) ([facility])  I47.2 427.1    3. S/P drug eluting coronary stent placement  Z95.5 V45.82    4. Familial hypercholesterolemia  E78.01 272.0             PVCs: S/p prior ablation x 2 by Dr. Yecenia Maher at [facility] had documented NSVT.  Increasingly frequent PVCs, symptomatic despite current sotalol & metoprolol.  See above for MRI result at [facility]; she is not convincing that nuclear stress test was correct since [facility] MRI said she had MI in inferolateral wall  She wants to have another cardiac cath to reevaluate stent  Will refer to Dr Yareli Villalpando or Bahman Sorto  CAD: Intermittent angina, s/p prior PCI.  Lexiscan cardiolite stress test in 08/2021 showed no infarct or ischemia, but cardiac MRI at [facility] in 02/2022 showed scar in basilar inferolat wall (7%) that they interpreted as consistent with MI.  Will order cardiac cath for further evaluation. In the interim, continue Imdur & NTG as previously prescribed. Hyperlipidemia: Continue Crestor, fenofibrate, & Zetia as previously prescribed.  Discussed possibility of Repatha or Praluent. Future Appointments   Date Time Provider Sheela Cynthia   9/1/2022 10:20 AM Jenn Valdez NP [facility]       Thank you for involving me in this patient's care and please call with further concerns or questions. Ike Goel M.D.    Electrophysiology/Cardiology   [facility]   [address]                             [phone]

## 2022-03-18 PROBLEM — R07.9 CHEST PAIN: Status: ACTIVE | Noted: 2021-08-19

## 2022-03-18 PROBLEM — K21.9 GERD (GASTROESOPHAGEAL REFLUX DISEASE): Status: ACTIVE | Noted: 2019-11-01

## 2022-03-19 PROBLEM — Z95.5 S/P CORONARY ARTERY STENT PLACEMENT: Status: ACTIVE | Noted: 2019-11-01

## 2022-03-20 PROBLEM — R07.9 ACUTE CHEST PAIN: Status: ACTIVE | Noted: 2020-09-15

## 2022-04-27 RX ORDER — ISOSORBIDE MONONITRATE 30 MG/1
30 TABLET, EXTENDED RELEASE ORAL DAILY
Qty: 90 TABLET | Refills: 1 | Status: SHIPPED | OUTPATIENT
Start: 2022-04-27 | End: 2022-08-03

## 2022-04-27 NOTE — TELEPHONE ENCOUNTER
Request for Imdur 30 mg daily. Last office visit 3/17/22, next office visit 8/4/22. Refills per verbal order from Dr. Ann Brantley.

## 2022-08-03 RX ORDER — ISOSORBIDE MONONITRATE 30 MG/1
30 TABLET, EXTENDED RELEASE ORAL DAILY
Qty: 90 TABLET | Refills: 1 | Status: SHIPPED | OUTPATIENT
Start: 2022-08-03

## 2022-08-03 NOTE — TELEPHONE ENCOUNTER
Requested Prescriptions     Signed Prescriptions Disp Refills    isosorbide mononitrate ER (IMDUR) 30 mg tablet 90 Tablet 1     Sig: Take 1 Tablet by mouth in the morning. Authorizing Provider: NOE JAIN     Ordering User: Luis Felipe Collins     Refill per verbal order Dr. Sarahy Ruffin. Last visit:3/17/22  Next visit: cancelled 8/4/22, to be rescheduled.

## 2022-11-01 LAB — MAMMOGRAPHY, EXTERNAL: NORMAL

## 2022-12-23 NOTE — ED TRIAGE NOTES
INFECTIOUS DISEASE PROGRESS NOTE    ASSESSMENT:   1. Left hand swelling and warmth  2. Hx of gout  3. Hypertension  4. Recent right basal ganglia ICH on 2022  5. Hyperlipidemia  6. History of tobacco use     Patient has left hand swelling that appears to be relapsing remitting over the past 1 month.  No erythema.  He has a history of gout.  He also has a peripheral IV just proximal to the left hand.     RECOMMENDATIONS    Left hand  swelling with improvement, pain resolved.  Patient is on prednisone per primary care throughout   Observe off of antibiotics.      Kristen Kiser, CNP   [phone]    -------------------------------------------------------------------------------------------------------------------    SUBJECTIVE:    Patient seen in physical therapy, left hand erythema resolved , with minimum swelling, pain resolved.  aebrile.  No leukocytosis on recent labs.    OBJECTIVE:  Tmax Temp (24hrs), Av.1 °F (36.7 °C), Min:97.7 °F (36.5 °C), Max:98.4 °F (36.9 °C)     Last Vitals   Vitals:    22 1105   BP: 136/69   Pulse: 66   Resp: 16   Temp: 98.1 °F (36.7 °C)       Gen:  Awake, alert participating in physical therapy.  NAD  HEENT: anicteric  CVS: RRR  Lung: clear to auscultation  Abd: soft, nt/nd, normal bs  Extr: left hand swelling min, without any erythema  Neuro: A&O  X3, left arm weakness  Skin: no rashes    ANTIMICROBIALS  None     LAB:  Recent Labs     22  0525   WBC 7.3   HGB 12.2*   HCT 37.5*      MCV 88.9       Recent Labs   Lab 22  0525 22  0525 22  0538   SODIUM 138  --  143   POTASSIUM 3.9 4.1 3.7   CHLORIDE 109  --  115*   CO2 25  --  24   BUN 20  --  18   CREATININE 0.55*  --  0.48*   GLUCOSE 96  --  95   CALCIUM 8.6  --  8.4       Microbiology Results     None            RADIOLOGY: images reviewed     Results for orders placed or performed during the hospital encounter of 22 (from the past 72 hour(s))   US VASC EXTREMITY UPPER VENOUS  Triage: pt c/o possible skin infection x1 month ago from nail salon to left foot near heel. Denies fever. DUPLEX    Impression    This examination is considered negative for acute deep venous  thrombosis within the left upper extremity.    Electronically Signed by: KRYSTINA BASHIR M.D.   Signed on: 12/20/2022 10:56 PM        Current Facility-Administered Medications   Medication   • hydrALAZINE (APRESOLINE) tablet 25 mg   • predniSONE (DELTASONE) tablet 40 mg   • pantoprazole (PROTONIX) EC tablet 40 mg   • docusate sodium-sennosides (SENOKOT S) 50-8.6 MG 2 tablet   • mirtazapine (REMERON) tablet 30 mg   • acetaminophen (TYLENOL) tablet 650 mg   • Magnesium Standard Replacement Protocol   • Potassium Standard Replacement Protocol (Levels 3.5 and lower)   • Potassium Replacement (Levels 3.6 - 4)   • petrolatum (white)-mineral oil (LUBRIFRESH PM) ophthalmic ointment   • hydrALAZINE (APRESOLINE) tablet 10 mg   • traMADol (ULTRAM) tablet 50 mg   • lidocaine (LIDOCARE) 4 % patch 1 patch   • lidocaine (LIDOCARE) 4 % patch 1 patch   • [Held by provider] amLODIPine (NORVASC) tablet 10 mg   • atorvastatin (LIPITOR) tablet 40 mg   • bisacodyl (DULCOLAX) suppository 10 mg   • carvedilol (COREG) tablet 6.25 mg   • cyanocobalamin injection 1,000 mcg   • [Held by provider] losartan (COZAAR) tablet 100 mg   • heparin (porcine) injection 5,000 Units         Note:  Assessment and Plan have been moved to the top of the screen for ease of the viewer such that the plan can be seen without scrolling to the bottom of the note.

## 2023-02-27 RX ORDER — ISOSORBIDE MONONITRATE 30 MG/1
30 TABLET, EXTENDED RELEASE ORAL DAILY
Qty: 90 TABLET | Refills: 1 | Status: SHIPPED | OUTPATIENT
Start: 2023-02-27

## 2023-02-27 NOTE — TELEPHONE ENCOUNTER
Patient is calling to request a 90 Day Supply for Isosorbide 30mg . She would also like to see if she can see  on 6. 1.23 or sometime in June. Please Advise.        Pharmacy Verified     [phone]

## 2023-02-27 NOTE — TELEPHONE ENCOUNTER
Request for Imdur 30 mg daily. Last office visit 3/17/22, next office visit 07/13/2023. Refills per verbal order from Dr. Patty Zaragoza. Verified patient with two types of identifiers. Scheduled follow up with Dr. Patty Zaragoza. Patient verbalized understanding and will call with any other questions.       Future Appointments   Date Time Provider Sheela Marie   7/13/2023  3:40 PM MD MARK Martin AMB

## 2023-03-06 ENCOUNTER — OFFICE VISIT (OUTPATIENT)
Dept: SURGERY | Age: 58
End: 2023-03-06
Payer: COMMERCIAL

## 2023-03-06 VITALS
DIASTOLIC BLOOD PRESSURE: 61 MMHG | WEIGHT: 180 LBS | HEART RATE: 60 BPM | BODY MASS INDEX: 31.89 KG/M2 | TEMPERATURE: 98.1 F | RESPIRATION RATE: 18 BRPM | SYSTOLIC BLOOD PRESSURE: 116 MMHG | HEIGHT: 63 IN | OXYGEN SATURATION: 96 %

## 2023-03-06 DIAGNOSIS — E66.9 CLASS 1 OBESITY WITH SERIOUS COMORBIDITY AND BODY MASS INDEX (BMI) OF 31.0 TO 31.9 IN ADULT, UNSPECIFIED OBESITY TYPE: ICD-10-CM

## 2023-03-06 DIAGNOSIS — K43.9 VENTRAL HERNIA WITHOUT OBSTRUCTION OR GANGRENE: Primary | ICD-10-CM

## 2023-03-06 PROCEDURE — 99204 OFFICE O/P NEW MOD 45 MIN: CPT | Performed by: SURGERY

## 2023-03-06 PROCEDURE — 3074F SYST BP LT 130 MM HG: CPT | Performed by: SURGERY

## 2023-03-06 PROCEDURE — 3078F DIAST BP <80 MM HG: CPT | Performed by: SURGERY

## 2023-03-06 NOTE — PROGRESS NOTES
Identified pt with two pt identifiers (name and ). Reviewed chart in preparation for visit and have obtained necessary documentation. Jennifer Cardenas is a 56 y.o. female  Chief Complaint   Patient presents with    Weight Management     Discuss weight loss options. Visit Vitals  /61 (BP 1 Location: Left upper arm, BP Patient Position: Sitting, BP Cuff Size: Large adult)   Pulse 60   Temp 98.1 °F (36.7 °C) (Oral)   Resp 18   Ht 5' 3\" (1.6 m)   Wt 180 lb (81.6 kg)   SpO2 96%   BMI 31.89 kg/m²       1. Have you been to the ER, urgent care clinic since your last visit? Hospitalized since your last visit? No    2. Have you seen or consulted any other health care providers outside of the [address] [place] [address] since your last visit? Include any pap smears or colon screening.  No

## 2023-03-06 NOTE — LETTER
3/6/2023    Patient: Kaylan Hankins   YOB: 1965   Date of Visit: 3/6/2023     Raisa Maxwell MD  [address] [address]  Via Fax: [phone]    Dear Raisa Maxwell MD,      Thank you for referring Ms. Jennifer Vargas to [address] [facility] [place] [address] for evaluation. My notes for this consultation are attached. If you have questions, please do not hesitate to call me. I look forward to following your patient along with you.       Sincerely,    Manisha Cook MD

## 2023-03-06 NOTE — PROGRESS NOTES
General Surgery Office Consultation / H & P    CC: Obesity and hernia  History of Present Illness: Dmitry Clayton is a 56 y.o. female who presents with obesity and hernia. Patient reports having infraumbilical pain since the delivery of her last child. She has a history of 3 prior C-sections. Patient reports intermittent pain caudad to her umbilicus on the right side. This is worse with palpation. The pain is sharp in nature at about a 7 or 8 out of 10 with palpation. No radiation. No other abdominal surgery besides a C-sections. Time improves the pain. No prior work-up for this hernia in particular    Also here to discuss her obesity. Significant history of cardiac disease early onset with a familial genetic trait for hypertriglyceridemia. She had a left main coronary event 10 years ago and now has 6 stents. She is dependent upon many medications for her heart. She is unable to exercise secondary to her severe exhaustion and beta-blockade she says. She has tried diet but has not improved much. She is here to discuss weight loss options. Interested in the band or sleeve. Significant back story given throughout the visit regarding her prior athletic history, cardiac events, cardiac events in her maternal and paternal side of the family including her father. Patient has anxiety and depression regarding these events.     Past Medical History:   Diagnosis Date    Bigeminal rhythm 2021    CAD (coronary artery disease)     Gastrointestinal disorder     hiatal hernia    Hyperlipidemia     genetic    Hypertension     Ill-defined condition     vertigo    MI (myocardial infarction) ([address]     PVC (premature ventricular contraction)     SVT (supraventricular tachycardia) ([address]      Past Surgical History:   Procedure Laterality Date    HX  SECTION      three    HX TUBAL LIGATION      ID UNLISTED PROCEDURE CARDIAC SURGERY  12    stents x 3    ID UNLISTED PROCEDURE CARDIAC SURGERY  10/2013 stents x 3      Family History   Problem Relation Age of Onset    Elevated Lipids Father     Heart Disease Father 46        heart attack    Elevated Lipids Brother     Elevated Lipids Paternal Grandfather     Heart Disease Paternal Grandfather     Hypertension Paternal Grandfather     Stroke Paternal Grandfather      Social History     Socioeconomic History    Marital status: SINGLE   Tobacco Use    Smoking status: Former     Packs/day: 2.00     Years: 30.00     Pack years: 60.00     Types: Cigarettes     Quit date: 6/1/2012     Years since quitting: 10.7    Smokeless tobacco: Never   Vaping Use    Vaping Use: Never used   Substance and Sexual Activity    Alcohol use: No     Alcohol/week: 0.0 standard drinks    Drug use: Never    Sexual activity: Yes     Partners: Male      Prior to Admission medications    Medication Sig Start Date End Date Taking? Authorizing Provider   isosorbide mononitrate ER (IMDUR) 30 mg tablet Take 1 Tablet by mouth daily. 2/27/23  Yes Aurora Valentin MD   ketoconazole (NIZORAL) 2 % topical cream 1 APPLICATION TO AFFECTED AREAS EXTERNALLY TWICE A DAY 3/8/22  Yes Provider, Historical   citalopram (CELEXA) 40 mg tablet Take 40 mg by mouth as needed. 9/13/21  Yes Provider, Historical   furosemide (LASIX) 20 mg tablet TAKE 1 TAB BY MOUTH EVERY DAY AS NEEDED FOR EDEMA (TAKE ADDITIONAL TAB OF POTASSIUM WHEN USING THIS) 9/13/21  Yes Provider, Historical   Klor-Con M20 20 mEq tablet Take 20 mEq by mouth daily. Taking 10 meq daily 9/30/21  Yes Provider, Historical   nitroglycerin (NITROSTAT) 0.4 mg SL tablet 0.4 mg by SubLINGual route. Yes Provider, Historical   metoprolol tartrate (LOPRESSOR) 25 mg tablet Take 25 mg by mouth daily. Yes Provider, Historical   diclofenac (VOLTAREN) 1 % gel APPLY 1 GRAM TO AFFECTED AREA 3 TIMES A DAY AS NEEDED 10/14/19  Yes Provider, Historical   ondansetron (ZOFRAN ODT) 8 mg disintegrating tablet Take 1 Tab by mouth every eight (8) hours as needed for Nausea. 3/23/19  Yes Fish Manley MD   pantoprazole (PROTONIX) 40 mg tablet Take 40 mg by mouth daily. Yes Other, MD Julieta   ALPRAZolam (XANAX) 1 mg tablet Take 1 mg by mouth three (3) times daily as needed for Anxiety. Yes Provider, Historical   ezetimibe (ZETIA) 10 mg tablet Take 10 mg by mouth daily. Yes Provider, Historical   aspirin delayed-release 81 mg tablet TAKE 2 TABLETS BY MOUTH DAILY 4/23/17  Yes Joel Méndez MD   SOTALOL HCL (SOTALOL PO) Take 60 mg by mouth two (2) times a day. Yes Provider, Historical   temazepam (RESTORIL) 30 mg capsule Take 30 mg by mouth nightly as needed for Sleep. Yes Provider, Historical   rosuvastatin (CRESTOR) 40 mg tablet Take 40 mg by mouth nightly. Yes Provider, Historical   fenofibrate (LOFIBRA) 160 mg tablet Take 1 Tab by mouth daily. 10/5/16  Yes Kuldeep Burrell MD   lisinopril (PRINIVIL, ZESTRIL) 5 mg tablet Take 1 Tab by mouth daily. Patient taking differently: Take 2.5 mg by mouth daily. 3/1/16  Yes Kuldeep Burrell MD   clopidogrel (PLAVIX) 75 mg tablet Take 1 Tab by mouth daily. 3/1/16  Yes Kuldeep Burrell MD   pirocin [facility]) 2 % ointment Apply  to affected area daily. Patient not taking: Reported on 3/6/2023 4/3/21   Gail Kay MD   meclizine (ANTIVERT) 25 mg tablet Take 25 mg by mouth three (3) times daily as needed for Dizziness. Patient not taking: Reported on 3/6/2023    Provider, Historical     Allergies   Allergen Reactions    Demerol [Meperidine] Other (comments)     Hallucinations     Erythromycin Anaphylaxis    Sulfadiazine Itching     Reaction Type:  Allergy       Review of Systems:  Constitutional: No fever or chills  Neurologic: No headache  Eyes: No scleral icterus or irritated eyes  Nose: No nasal pain or drainage  Mouth: No oral lesions or sore throat  Cardiac: No palpations or chest pain  Pulmonary: No cough or shortness of breath  Gastrointestinal: Hernia pain, no nausea, emesis, diarrhea, or constipation  Genitourinary: No dysuria  Musculoskeletal: No muscle or joint tenderness  Skin: No rashes or lesions  Psychiatric: Anxiety and depression    Physical Exam:   Visit Vitals  /61 (BP 1 Location: Left upper arm, BP Patient Position: Sitting, BP Cuff Size: Large adult)   Pulse 60   Temp 98.1 °F (36.7 °C) (Oral)   Resp 18   Ht 5' 3\" (1.6 m)   Wt 180 lb (81.6 kg)   SpO2 96%   BMI 31.89 kg/m²     General: No acute distress, conversant  Eyes: PERRLA, no scleral icterus  HENT: Normocephalic without oral lesions  Neck: Trachea midline without LAD  Cardiac: Normal pulse rate and rhythm  Pulmonary: Symmetric chest rise with normal effort  GI: Soft, obese, tender caudad into the right of the umbilicus, could not feel a definitive hernia defect, NT, no splenomegaly  Skin: Warm without rash  Extremities: No edema or joint stiffness  Psych: Appropriate mood and affect    Assessment:     53-year-old female with obesity and significant genetic cardiac issues along with ventral hernia    Plan:     In terms of the hernia, I recommend a CT scan without contrast to evaluate the defects. We will discuss once it is back regarding robotic repair with mesh likely. In regards to her obesity. She does not yet qualify for surgical weight loss. I recommend evaluation by our bariatrician's to evaluate for meal plans, appetite suppressants, and various injectable medications she may qualify for. I understand she has a significant heart history and optimizing her weight will go a long way in terms of increasing her activity and decreasing her future risk factors for cardiac events. Majority of the visit was spent discussing her prior history of cardiac events, the anxiety this causes going forward, her current living situation, and her frustration of not being able to have surgical weight loss given her complex medical history. Total visit time was over 45 minutes.     Signed By: Eliazar Gunter MD  Bariatric and General Surgeon  [facility]    March 6, 2023

## 2023-03-07 ENCOUNTER — TELEPHONE (OUTPATIENT)
Dept: SURGERY | Age: 58
End: 2023-03-07

## 2023-03-07 NOTE — TELEPHONE ENCOUNTER
Called patient regarding referral to our [address] [place] [address] [facility]. Patient did not answer so I left a vmail with our contact information.

## 2023-03-23 ENCOUNTER — HOSPITAL ENCOUNTER (OUTPATIENT)
Dept: CT IMAGING | Age: 58
Discharge: HOME | End: 2023-03-23
Attending: SURGERY
Payer: COMMERCIAL

## 2023-03-23 DIAGNOSIS — K43.9 VENTRAL HERNIA WITHOUT OBSTRUCTION OR GANGRENE: ICD-10-CM

## 2023-03-23 PROCEDURE — 74176 CT ABD & PELVIS W/O CONTRAST: CPT

## 2023-03-24 ENCOUNTER — VIRTUAL VISIT (OUTPATIENT)
Dept: SURGERY | Age: 58
End: 2023-03-24

## 2023-03-24 ENCOUNTER — TELEPHONE (OUTPATIENT)
Dept: SURGERY | Age: 58
End: 2023-03-24

## 2023-03-24 VITALS — HEIGHT: 63 IN | BODY MASS INDEX: 31.89 KG/M2

## 2023-03-24 DIAGNOSIS — K42.9 UMBILICAL HERNIA WITHOUT OBSTRUCTION AND WITHOUT GANGRENE: ICD-10-CM

## 2023-03-24 DIAGNOSIS — E66.9 OBESITY (BMI 30-39.9): Primary | ICD-10-CM

## 2023-03-24 NOTE — PROGRESS NOTES
Surgery Progress Note    3/24/2023    CC: Hernia    Subjective:     Patient following up today after having her CT scan. CT scan confirmed a small incarcerated umbilical hernia. She reports the pain is intermittent but not lifestyle limiting. Her ex- recently was found to have metastatic cancer and not renal failure. Working on this. She engaged with the medical weight loss folks downstairs but is hesitant to proceed with all the steps that they require downstairs and wants to have a more direct route to medical weight loss option. She has discussed this with her cardiologist who want her to avoid stimulants including Ozempic which may cause arrhythmias. Consent:  The patient and/or their healthcare decision maker is aware that this patient-initiated Telehealth encounter is a billable service, with coverage as determined by the patient's insurance carrier. They are aware that they may receive a bill and has provided verbal consent to proceed: Yes     This virtual visit was conducted via [employer]. Pursuant to the emergency declaration under the [facility] waiver authority and the [employer] Act, this Virtual  Visit was conducted to reduce the patient's risk of exposure to COVID-19 and provide continuity of care for an established patient. Services were provided through a video synchronous discussion virtually to substitute for in-person clinic visit. Due to this being a TeleHealth evaluation, many elements of the physical examination are unable to be assessed.        Constitutional: No fever or chills  Neurologic: No headache  Eyes: No scleral icterus or irritated eyes  Nose: No nasal pain or drainage  Mouth: No oral lesions or sore throat  Cardiac: No palpations or chest pain  Pulmonary: No cough or shortness of breath  Gastrointestinal: No nausea, emesis, diarrhea, or constipation  Genitourinary: No dysuria  Musculoskeletal: No muscle or joint tenderness  Skin: No rashes or lesions  Psychiatric: No anxiety or depressed mood    Objective:   Visit Vitals  Ht 5' 3\" (1.6 m)   BMI 31.89 kg/m²       General: No acute distress, conversant      Assessment:     68-year-old female with significant history of cardiac disease with small umbilical hernia and obesity    Plan:     Once again reinforced that she does not meet the criteria for surgical weight loss. I will have her discuss options with my NP in the office to see if we can offer her Saxenda or Wegovy. If she does not feel comfortable we can defer back to the medical weight loss team downstairs but the patient prefers to be seen up here if possible. She will contact me back in the fall to discuss surgery again for her umbilical hernia. We discussed doing an open repair with suture which has about a 10 to 20% risk of recurrence versus a robotic repair with mesh with a 1 to 2% risk of recurrence. Patient understands the above and is leaning towards probably just a primary suture repair. I will see her back this fall before surgery.       Nikki Fisher MD  Bariatric and General Surgeon  [facility]

## 2023-03-24 NOTE — TELEPHONE ENCOUNTER
I called to check the patient in for her 10:30 virtual appointment. I did leave a message requesting a return call.

## 2023-03-24 NOTE — PROGRESS NOTES
Identified pt with two pt identifiers (name and ). Reviewed chart in preparation for visit and have obtained necessary documentation. Christina Ventura is a 56 y.o. female  Chief Complaint   Patient presents with    Obesity     [phone] (Mobile)    Hernia (Non Specific)     Visit Vitals  Ht 5' 3\" (1.6 m)   BMI 31.89 kg/m²       1. Have you been to the ER, urgent care clinic since your last visit? Hospitalized since your last visit? No    2. Have you seen or consulted any other health care providers outside of the [address] [place] [address] since your last visit? Include any pap smears or colon screening.  No

## 2023-03-26 ENCOUNTER — APPOINTMENT (OUTPATIENT)
Dept: GENERAL RADIOLOGY | Age: 58
DRG: 192 | End: 2023-03-26
Attending: STUDENT IN AN ORGANIZED HEALTH CARE EDUCATION/TRAINING PROGRAM
Payer: COMMERCIAL

## 2023-03-26 ENCOUNTER — HOSPITAL ENCOUNTER (INPATIENT)
Age: 58
LOS: 1 days | Discharge: HOME | DRG: 192 | End: 2023-03-28
Attending: STUDENT IN AN ORGANIZED HEALTH CARE EDUCATION/TRAINING PROGRAM | Admitting: INTERNAL MEDICINE
Payer: COMMERCIAL

## 2023-03-26 DIAGNOSIS — I25.10 CORONARY ARTERY DISEASE INVOLVING NATIVE CORONARY ARTERY OF NATIVE HEART WITHOUT ANGINA PECTORIS: ICD-10-CM

## 2023-03-26 DIAGNOSIS — I20.0 UNSTABLE ANGINA PECTORIS (HCC): ICD-10-CM

## 2023-03-26 DIAGNOSIS — R07.9 CHEST PAIN, UNSPECIFIED TYPE: Primary | ICD-10-CM

## 2023-03-26 LAB
ALBUMIN SERPL-MCNC: 3.9 G/DL (ref 3.5–5)
ALBUMIN/GLOB SERPL: 1.2 (ref 1.1–2.2)
ALP SERPL-CCNC: 59 U/L (ref 45–117)
ALT SERPL-CCNC: 29 U/L (ref 12–78)
ANION GAP SERPL CALC-SCNC: 1 MMOL/L (ref 5–15)
AST SERPL-CCNC: 24 U/L (ref 15–37)
BASOPHILS # BLD: 0 K/UL (ref 0–0.1)
BASOPHILS NFR BLD: 1 % (ref 0–1)
BILIRUB SERPL-MCNC: 0.4 MG/DL (ref 0.2–1)
BUN SERPL-MCNC: 14 MG/DL (ref 6–20)
BUN/CREAT SERPL: 16 (ref 12–20)
CALCIUM SERPL-MCNC: 9.7 MG/DL (ref 8.5–10.1)
CHLORIDE SERPL-SCNC: 108 MMOL/L (ref 97–108)
CO2 SERPL-SCNC: 30 MMOL/L (ref 21–32)
COMMENT, HOLDF: NORMAL
CREAT SERPL-MCNC: 0.87 MG/DL (ref 0.55–1.02)
DIFFERENTIAL METHOD BLD: NORMAL
EOSINOPHIL # BLD: 0.3 K/UL (ref 0–0.4)
EOSINOPHIL NFR BLD: 4 % (ref 0–7)
ERYTHROCYTE [DISTWIDTH] IN BLOOD BY AUTOMATED COUNT: 13 % (ref 11.5–14.5)
GLOBULIN SER CALC-MCNC: 3.3 G/DL (ref 2–4)
GLUCOSE SERPL-MCNC: 149 MG/DL (ref 65–100)
HCT VFR BLD AUTO: 36.5 % (ref 35–47)
HGB BLD-MCNC: 12.4 G/DL (ref 11.5–16)
IMM GRANULOCYTES # BLD AUTO: 0 K/UL (ref 0–0.04)
IMM GRANULOCYTES NFR BLD AUTO: 0 % (ref 0–0.5)
LYMPHOCYTES # BLD: 2.5 K/UL (ref 0.8–3.5)
LYMPHOCYTES NFR BLD: 38 % (ref 12–49)
MCH RBC QN AUTO: 31.3 PG (ref 26–34)
MCHC RBC AUTO-ENTMCNC: 34 G/DL (ref 30–36.5)
MCV RBC AUTO: 92.2 FL (ref 80–99)
MONOCYTES # BLD: 0.4 K/UL (ref 0–1)
MONOCYTES NFR BLD: 7 % (ref 5–13)
NEUTS SEG # BLD: 3.3 K/UL (ref 1.8–8)
NEUTS SEG NFR BLD: 50 % (ref 32–75)
NRBC # BLD: 0 K/UL (ref 0–0.01)
NRBC BLD-RTO: 0 PER 100 WBC
PLATELET # BLD AUTO: 217 K/UL (ref 150–400)
PMV BLD AUTO: 10.1 FL (ref 8.9–12.9)
POTASSIUM SERPL-SCNC: 3.4 MMOL/L (ref 3.5–5.1)
PROT SERPL-MCNC: 7.2 G/DL (ref 6.4–8.2)
RBC # BLD AUTO: 3.96 M/UL (ref 3.8–5.2)
SAMPLES BEING HELD,HOLD: NORMAL
SODIUM SERPL-SCNC: 139 MMOL/L (ref 136–145)
TROPONIN I SERPL HS-MCNC: 3 NG/L (ref 0–37)
WBC # BLD AUTO: 6.5 K/UL (ref 3.6–11)

## 2023-03-26 PROCEDURE — 80053 COMPREHEN METABOLIC PANEL: CPT

## 2023-03-26 PROCEDURE — 71045 X-RAY EXAM CHEST 1 VIEW: CPT

## 2023-03-26 PROCEDURE — 99285 EMERGENCY DEPT VISIT HI MDM: CPT

## 2023-03-26 PROCEDURE — 85025 COMPLETE CBC W/AUTO DIFF WBC: CPT

## 2023-03-26 PROCEDURE — 93005 ELECTROCARDIOGRAM TRACING: CPT

## 2023-03-26 PROCEDURE — 36415 COLL VENOUS BLD VENIPUNCTURE: CPT

## 2023-03-26 PROCEDURE — 84484 ASSAY OF TROPONIN QUANT: CPT

## 2023-03-26 NOTE — Clinical Note
Cardiac Cath Lab Procedure Area Arrival Note:    Tasneem Edwards arrived to Cardiac Cath Lab, Procedure Area. Patient identifiers verified with NAME and DATE OF BIRTH. Procedure verified with patient. Consent forms verified. Allergies verified. Patient informed of procedure and plan of care. Questions answered with review. Patient voiced understanding of procedure and plan of care. Patient on cardiac monitor, non-invasive blood pressure, SPO2 monitor. On room air, placed on O2 @ 2 lpm via nasal cannula. IV of NaCl on pump at 50 ml/hr. Patient status doing well without problems. Patient is A&Ox 4. Patient reports no chest pain or SOB. Patient medicated during procedure with orders obtained and verified by Dr. Lauryn Rosen. Refer to patients Cardiac Cath Lab PROCEDURE REPORT for vital signs, assessment, status, and response during procedure, printed at end of case. Printed report on chart or scanned into chart.

## 2023-03-27 LAB
AMPHET UR QL SCN: NEGATIVE
ATRIAL RATE: 74 BPM
BARBITURATES UR QL SCN: NEGATIVE
BENZODIAZ UR QL: NEGATIVE
BNP SERPL-MCNC: 218 PG/ML
CALCULATED P AXIS, ECG09: 48 DEGREES
CALCULATED R AXIS, ECG10: 2 DEGREES
CALCULATED T AXIS, ECG11: 57 DEGREES
CANNABINOIDS UR QL SCN: NEGATIVE
COCAINE UR QL SCN: NEGATIVE
COMMENT, HOLDF: NORMAL
D DIMER PPP FEU-MCNC: 0.23 MG/L FEU (ref 0–0.65)
DIAGNOSIS, 93000: NORMAL
DRUG SCRN COMMENT,DRGCM: NORMAL
EST. AVERAGE GLUCOSE BLD GHB EST-MCNC: 114 MG/DL
HBA1C MFR BLD: 5.6 % (ref 4–5.6)
LIPASE SERPL-CCNC: 129 U/L (ref 73–393)
MAGNESIUM SERPL-MCNC: 2.1 MG/DL (ref 1.6–2.4)
METHADONE UR QL: NEGATIVE
OPIATES UR QL: NEGATIVE
P-R INTERVAL, ECG05: 208 MS
PCP UR QL: NEGATIVE
PHOSPHATE SERPL-MCNC: 4.2 MG/DL (ref 2.6–4.7)
Q-T INTERVAL, ECG07: 400 MS
QRS DURATION, ECG06: 78 MS
QTC CALCULATION (BEZET), ECG08: 444 MS
SAMPLES BEING HELD,HOLD: NORMAL
TROPONIN I SERPL HS-MCNC: 4 NG/L (ref 0–37)
TROPONIN I SERPL HS-MCNC: <3 NG/L (ref 0–37)
TSH SERPL DL<=0.05 MIU/L-ACNC: 1.62 UIU/ML (ref 0.36–3.74)
VENTRICULAR RATE, ECG03: 74 BPM

## 2023-03-27 PROCEDURE — 74011250637 HC RX REV CODE- 250/637: Performed by: INTERNAL MEDICINE

## 2023-03-27 PROCEDURE — 83690 ASSAY OF LIPASE: CPT

## 2023-03-27 PROCEDURE — 83735 ASSAY OF MAGNESIUM: CPT

## 2023-03-27 PROCEDURE — 85379 FIBRIN DEGRADATION QUANT: CPT

## 2023-03-27 PROCEDURE — 74011250637 HC RX REV CODE- 250/637: Performed by: NURSE PRACTITIONER

## 2023-03-27 PROCEDURE — 84100 ASSAY OF PHOSPHORUS: CPT

## 2023-03-27 PROCEDURE — 99223 1ST HOSP IP/OBS HIGH 75: CPT | Performed by: SPECIALIST

## 2023-03-27 PROCEDURE — 65270000046 HC RM TELEMETRY

## 2023-03-27 PROCEDURE — 84484 ASSAY OF TROPONIN QUANT: CPT

## 2023-03-27 PROCEDURE — 83880 ASSAY OF NATRIURETIC PEPTIDE: CPT

## 2023-03-27 PROCEDURE — 84443 ASSAY THYROID STIM HORMONE: CPT

## 2023-03-27 PROCEDURE — 83036 HEMOGLOBIN GLYCOSYLATED A1C: CPT

## 2023-03-27 PROCEDURE — 80307 DRUG TEST PRSMV CHEM ANLYZR: CPT

## 2023-03-27 PROCEDURE — 36415 COLL VENOUS BLD VENIPUNCTURE: CPT

## 2023-03-27 PROCEDURE — 74011000250 HC RX REV CODE- 250: Performed by: INTERNAL MEDICINE

## 2023-03-27 RX ORDER — POTASSIUM CHLORIDE 750 MG/1
40 TABLET, FILM COATED, EXTENDED RELEASE ORAL
Status: COMPLETED | OUTPATIENT
Start: 2023-03-27 | End: 2023-03-27

## 2023-03-27 RX ORDER — ACETAMINOPHEN 650 MG/1
650 SUPPOSITORY RECTAL
Status: DISCONTINUED | OUTPATIENT
Start: 2023-03-27 | End: 2023-03-28 | Stop reason: HOSPADM

## 2023-03-27 RX ORDER — PANTOPRAZOLE SODIUM 40 MG/1
40 TABLET, DELAYED RELEASE ORAL DAILY
Status: DISCONTINUED | OUTPATIENT
Start: 2023-03-27 | End: 2023-03-28 | Stop reason: HOSPADM

## 2023-03-27 RX ORDER — ONDANSETRON 2 MG/ML
4 INJECTION INTRAMUSCULAR; INTRAVENOUS
Status: DISCONTINUED | OUTPATIENT
Start: 2023-03-27 | End: 2023-03-27

## 2023-03-27 RX ORDER — LIDOCAINE 50 MG/G
1 PATCH TOPICAL DAILY
COMMUNITY
Start: 2023-02-01 | End: 2024-02-01

## 2023-03-27 RX ORDER — ASPIRIN 81 MG/1
162 TABLET ORAL DAILY
Status: DISCONTINUED | OUTPATIENT
Start: 2023-03-27 | End: 2023-03-28 | Stop reason: HOSPADM

## 2023-03-27 RX ORDER — FUROSEMIDE 20 MG/1
20 TABLET ORAL DAILY PRN
Status: DISCONTINUED | OUTPATIENT
Start: 2023-03-27 | End: 2023-03-28 | Stop reason: HOSPADM

## 2023-03-27 RX ORDER — ARIPIPRAZOLE 2 MG/1
2 TABLET ORAL DAILY
Status: DISCONTINUED | OUTPATIENT
Start: 2023-03-27 | End: 2023-03-28 | Stop reason: HOSPADM

## 2023-03-27 RX ORDER — FENOFIBRATE 160 MG/1
160 TABLET ORAL DAILY
Status: DISCONTINUED | OUTPATIENT
Start: 2023-03-27 | End: 2023-03-28 | Stop reason: HOSPADM

## 2023-03-27 RX ORDER — LISINOPRIL 5 MG/1
5 TABLET ORAL DAILY
Status: DISCONTINUED | OUTPATIENT
Start: 2023-03-27 | End: 2023-03-28 | Stop reason: HOSPADM

## 2023-03-27 RX ORDER — SOTALOL HYDROCHLORIDE 80 MG/1
40 TABLET ORAL EVERY 12 HOURS
Status: DISCONTINUED | OUTPATIENT
Start: 2023-03-27 | End: 2023-03-28 | Stop reason: HOSPADM

## 2023-03-27 RX ORDER — TEMAZEPAM 15 MG/1
30 CAPSULE ORAL
Status: DISCONTINUED | OUTPATIENT
Start: 2023-03-27 | End: 2023-03-28 | Stop reason: HOSPADM

## 2023-03-27 RX ORDER — METOPROLOL SUCCINATE 25 MG/1
25 TABLET, EXTENDED RELEASE ORAL
Status: DISCONTINUED | OUTPATIENT
Start: 2023-03-27 | End: 2023-03-28 | Stop reason: HOSPADM

## 2023-03-27 RX ORDER — POLYETHYLENE GLYCOL 3350 17 G/17G
17 POWDER, FOR SOLUTION ORAL DAILY PRN
Status: DISCONTINUED | OUTPATIENT
Start: 2023-03-27 | End: 2023-03-28 | Stop reason: HOSPADM

## 2023-03-27 RX ORDER — ISOSORBIDE MONONITRATE 30 MG/1
30 TABLET, EXTENDED RELEASE ORAL DAILY
Status: DISCONTINUED | OUTPATIENT
Start: 2023-03-27 | End: 2023-03-28 | Stop reason: HOSPADM

## 2023-03-27 RX ORDER — ALPRAZOLAM 0.25 MG/1
1 TABLET ORAL
Status: DISCONTINUED | OUTPATIENT
Start: 2023-03-27 | End: 2023-03-28 | Stop reason: HOSPADM

## 2023-03-27 RX ORDER — ROSUVASTATIN CALCIUM 40 MG/1
40 TABLET, COATED ORAL
Status: DISCONTINUED | OUTPATIENT
Start: 2023-03-27 | End: 2023-03-28 | Stop reason: HOSPADM

## 2023-03-27 RX ORDER — NITROGLYCERIN 0.4 MG/1
0.4 TABLET SUBLINGUAL
Status: DISCONTINUED | OUTPATIENT
Start: 2023-03-27 | End: 2023-03-28 | Stop reason: HOSPADM

## 2023-03-27 RX ORDER — CITALOPRAM 20 MG/1
40 TABLET, FILM COATED ORAL AS NEEDED
Status: DISCONTINUED | OUTPATIENT
Start: 2023-03-27 | End: 2023-03-28 | Stop reason: HOSPADM

## 2023-03-27 RX ORDER — MORPHINE SULFATE 2 MG/ML
2 INJECTION, SOLUTION INTRAMUSCULAR; INTRAVENOUS
Status: DISCONTINUED | OUTPATIENT
Start: 2023-03-27 | End: 2023-03-28 | Stop reason: HOSPADM

## 2023-03-27 RX ORDER — METOPROLOL TARTRATE 25 MG/1
25 TABLET, FILM COATED ORAL DAILY
Status: DISCONTINUED | OUTPATIENT
Start: 2023-03-27 | End: 2023-03-27

## 2023-03-27 RX ORDER — ARIPIPRAZOLE 2 MG/1
TABLET ORAL
COMMUNITY
Start: 2023-01-23

## 2023-03-27 RX ORDER — ACETAMINOPHEN 325 MG/1
650 TABLET ORAL
Status: DISCONTINUED | OUTPATIENT
Start: 2023-03-27 | End: 2023-03-28 | Stop reason: HOSPADM

## 2023-03-27 RX ORDER — ONDANSETRON 4 MG/1
4 TABLET, ORALLY DISINTEGRATING ORAL
Status: DISCONTINUED | OUTPATIENT
Start: 2023-03-27 | End: 2023-03-27

## 2023-03-27 RX ORDER — DICLOFENAC SODIUM 10 MG/G
2 GEL TOPICAL
Status: DISCONTINUED | OUTPATIENT
Start: 2023-03-27 | End: 2023-03-28 | Stop reason: HOSPADM

## 2023-03-27 RX ORDER — EZETIMIBE 10 MG/1
10 TABLET ORAL DAILY
Status: DISCONTINUED | OUTPATIENT
Start: 2023-03-27 | End: 2023-03-28 | Stop reason: HOSPADM

## 2023-03-27 RX ORDER — ENOXAPARIN SODIUM 100 MG/ML
40 INJECTION SUBCUTANEOUS DAILY
Status: DISCONTINUED | OUTPATIENT
Start: 2023-03-27 | End: 2023-03-28 | Stop reason: HOSPADM

## 2023-03-27 RX ORDER — SODIUM CHLORIDE 0.9 % (FLUSH) 0.9 %
5-40 SYRINGE (ML) INJECTION AS NEEDED
Status: DISCONTINUED | OUTPATIENT
Start: 2023-03-27 | End: 2023-03-28 | Stop reason: HOSPADM

## 2023-03-27 RX ORDER — CLOPIDOGREL BISULFATE 75 MG/1
75 TABLET ORAL DAILY
Status: DISCONTINUED | OUTPATIENT
Start: 2023-03-27 | End: 2023-03-28 | Stop reason: HOSPADM

## 2023-03-27 RX ORDER — SODIUM CHLORIDE 0.9 % (FLUSH) 0.9 %
5-40 SYRINGE (ML) INJECTION EVERY 8 HOURS
Status: DISCONTINUED | OUTPATIENT
Start: 2023-03-27 | End: 2023-03-28 | Stop reason: HOSPADM

## 2023-03-27 RX ADMIN — LISINOPRIL 5 MG: 10 TABLET ORAL at 10:13

## 2023-03-27 RX ADMIN — SOTALOL HYDROCHLORIDE 40 MG: 80 TABLET ORAL at 17:52

## 2023-03-27 RX ADMIN — ROSUVASTATIN 40 MG: 40 TABLET, FILM COATED ORAL at 21:32

## 2023-03-27 RX ADMIN — FENOFIBRATE 160 MG: 160 TABLET ORAL at 10:12

## 2023-03-27 RX ADMIN — METOPROLOL SUCCINATE 25 MG: 25 TABLET, EXTENDED RELEASE ORAL at 17:59

## 2023-03-27 RX ADMIN — EZETIMIBE 10 MG: 10 TABLET ORAL at 10:13

## 2023-03-27 RX ADMIN — CLOPIDOGREL BISULFATE 75 MG: 75 TABLET ORAL at 10:13

## 2023-03-27 RX ADMIN — Medication 10 ML: at 06:00

## 2023-03-27 RX ADMIN — POTASSIUM CHLORIDE 40 MEQ: 750 TABLET, FILM COATED, EXTENDED RELEASE ORAL at 06:14

## 2023-03-27 RX ADMIN — ISOSORBIDE MONONITRATE 30 MG: 30 TABLET, EXTENDED RELEASE ORAL at 10:13

## 2023-03-27 RX ADMIN — ASPIRIN 162 MG: 81 TABLET, COATED ORAL at 10:12

## 2023-03-27 RX ADMIN — Medication 10 ML: at 17:53

## 2023-03-27 RX ADMIN — ACETAMINOPHEN 650 MG: 325 TABLET ORAL at 20:19

## 2023-03-27 RX ADMIN — Medication 10 ML: at 21:33

## 2023-03-27 NOTE — PROGRESS NOTES
[facility] Adult  Hospitalist Group                                                                                          Hospitalist Progress Note  Dimitris Kirk MD  Answering service: [phone] from in house phone        Date of Service:  3/27/2023  NAME:  Dale Lopez  :  1965  MRN:  [id number]       Admission Summary:   HPI: \"This is a 61-year-old woman with past medical history significant for coronary artery disease status post multiple stent placement, up to six stents; dyslipidemia; anxiety/depression; and hypertension, presented at the emergency room with chest pain. This has been going on for the past 3 days. Initially, the chest pain was intermittent but it has become constant in the last 24 hours. Pain is located at the center of the chest, constant burning pain, associated with shortness of breath but no radiation, no known aggravating or relieving factors, 9/10 in severity. The patient denies associated fever, rigors or chills. When the patient arrived at the emergency room, the first set of troponin was negative but because the patient has established coronary artery disease and she is status post multiple stent placement, she was referred to the hospitalist service for admission. She was last admitted to the hospital from 2021 to 2021. The patient at that time presented with chest pain, underwent stress test that was negative. \"  Interval history / Subjective:   Patient seen and examined at bedside earlier.   Had some chest pain earlier this a.m. denies any significant shortness of breath but states that the pain is exacerbated with activity     Assessment & Plan:     Chest pain/NSTEMI  Hx of CAD with 6 prior stents   History of PVCs/NSVT post PVC ablation 2017  Hypokalemia  Obesity  Anxiety  HTN  HLD  GERD  Patient has significant risk factors despite having negative cardiac enzymes high risk, cannot tolerate stress test  - Appreciate cardiology recs cardiac cath today  - Continue aspirin Plavix statin Imdur  -Continue sotalol and Toprol, Currently in sinus rhythm  -Replete lytes and follow, magnesium within normal limit  -Counseled on diet weight loss  -Continue Abilify/Celexa/Xanax/Restoril    CRITICAL CARE ATTESTATION:  I had a face to face encounter with the patient, reviewed and interpreted patient data including clinical events, labs, images, vital signs, I/O's, and examined patient. I have discussed the case and the plan and management of the patient's care with the consulting services, the bedside nurses and necessary ancillary providers. NOTE OF PERSONAL INVOLVEMENT IN CARE   This patient has a high probability of imminent, clinically significant deterioration, which requires the highest level of preparedness to intervene urgently. I participated in the decision-making and personally managed or directed the management of the following life and organ supporting interventions that required my frequent assessment to treat or prevent imminent deterioration. I personally spent 40 minutes of critical care time. This is time spent at this critically ill patient's bedside actively involved in patient care as well as the coordination of care and discussions with the patient's family. This does not include any procedural time which has been billed separately.         Code status: full   Prophylaxis: scd  Care Plan discussed with:/Family, nurse  Anticipated Disposition: 24 to 48 hours pending cardiac cath  Inpatient  Cardiac monitoring: Telemetry     Hospital Problems  Date Reviewed: 3/27/2023            Codes Class Noted POA    * (Principal) Chest pain ICD-10-CM: R07.9  ICD-9-CM: 786.50  8/19/2021 Yes           Social Determinants of Health     Tobacco Use: Medium Risk    Smoking Tobacco Use: Former    Smokeless Tobacco Use: Never    Passive Exposure: Not on file   Alcohol Use: Not on file   Financial Resource Strain: Not on file Food Insecurity: Not on file   Transportation Needs: Not on file   Physical Activity: Not on file   Stress: Not on file   Social Connections: Not on file   Intimate Partner Violence: Not on file   Depression: Not at risk    PHQ-2 Score: 2   Housing Stability: Not on file       Review of Systems:   A comprehensive review of systems was negative except for that written in the HPI. Vital Signs:    Last 24hrs VS reviewed since prior progress note. Most recent are:  Visit Vitals  /70 (BP 1 Location: Left upper arm, BP Patient Position: At rest)   Pulse 61   Temp 98.2 °F (36.8 °C)   Resp 15   Ht 5' 3\" (1.6 m)   Wt 86.3 kg (190 lb 4.1 oz)   SpO2 96%   BMI 33.70 kg/m²       No intake or output data in the 24 hours ending 03/27/23 1422     Physical Examination:     I had a face to face encounter with this patient and independently examined them on 3/27/2023 as outlined below:          General : alert x 3, awake, no acute distress,   HEENT: PEERL, EOMI, moist mucus membrane  Neck: supple, no JVD, no meningeal signs  Chest: Clear to auscultation bilaterally   CVS: S1 S2 heard, Capillary refill less than 2 seconds  Abd: soft/ non tender, non distended, BS physiological,   Ext: no clubbing, no cyanosis, no edema, brisk 2+ DP pulses  Neuro/Psych: pleasant mood and affect, CN 2-12 grossly intact, sensory grossly within normal limit, Strength 5/5 in all extremities  Skin: warm     Data Review:    Review and/or order of clinical lab test  Review and/or order of tests in the radiology section of CPT  Review and/or order of tests in the medicine section of CPT      I have personally and independently reviewed all pertinent labs, diagnostic studies, imaging, and have provided independent interpretation of the same.      Labs:     Recent Labs     03/26/23 2215   WBC 6.5   HGB 12.4   HCT 36.5        Recent Labs     03/27/23  0554 03/26/23  2215   NA  --  139   K  --  3.4*   CL  --  108   CO2  --  30   BUN  --  14 CREA  --  0.87   GLU  --  149*   CA  --  9.7   MG 2.1  --    PHOS 4.2  --      Recent Labs     03/27/23  0554 03/26/23  2215   ALT  --  29   AP  --  59   TBILI  --  0.4   TP  --  7.2   ALB  --  3.9   GLOB  --  3.3   LPSE 129  --      No results for input(s): INR, PTP, APTT, INREXT in the last 72 hours. No results for input(s): FE, TIBC, PSAT, FERR in the last 72 hours. No results found for: FOL, RBCF   No results for input(s): PH, PCO2, PO2 in the last 72 hours. No results for input(s): CPK, CKNDX, TROIQ in the last 72 hours. No lab exists for component: CPKMB  Lab Results   Component Value Date/Time    Cholesterol, total 102 09/16/2020 05:00 AM    HDL Cholesterol 42 09/16/2020 05:00 AM    LDL, calculated 43.6 09/16/2020 05:00 AM    Triglyceride 82 09/16/2020 05:00 AM    CHOL/HDL Ratio 2.4 09/16/2020 05:00 AM     Lab Results   Component Value Date/Time    Glucose (POC) 110 (H) 06/23/2020 09:37 PM    Glucose (POC) 76 11/08/2015 09:12 PM     Lab Results   Component Value Date/Time    Color YELLOW/STRAW 09/15/2020 08:37 PM    Appearance CLEAR 09/15/2020 08:37 PM    Specific gravity 1.010 09/15/2020 08:37 PM    Specific gravity 1.007 06/23/2020 09:05 PM    pH (UA) 7.0 09/15/2020 08:37 PM    Protein Negative 09/15/2020 08:37 PM    Glucose Negative 09/15/2020 08:37 PM    Ketone Negative 09/15/2020 08:37 PM    Bilirubin Negative 09/15/2020 08:37 PM    Urobilinogen 0.2 09/15/2020 08:37 PM    Nitrites Negative 09/15/2020 08:37 PM    Leukocyte Esterase TRACE (A) 09/15/2020 08:37 PM    Epithelial cells FEW 09/15/2020 08:37 PM    Bacteria Negative 09/15/2020 08:37 PM    WBC 0-4 09/15/2020 08:37 PM    RBC 0-5 09/15/2020 08:37 PM       Notes reviewed from all clinical/nonclinical/nursing services involved in patient's clinical care. Care coordination discussions were held with appropriate clinical/nonclinical/ nursing providers based on care coordination needs.          Patients current active Medications were reviewed, considered, added and adjusted based on the clinical condition today. Home Medications were reconciled to the best of my ability given all available resources at the time of admission. Route is PO if not otherwise noted.       Admission Status:[id number]:::1}      Medications Reviewed:     Current Facility-Administered Medications   Medication Dose Route Frequency    ALPRAZolam (XANAX) tablet 1 mg  1 mg Oral TID PRN    ARIPiprazole (ABILIFY) tablet 2 mg  2 mg Oral DAILY    aspirin delayed-release tablet 162 mg  162 mg Oral DAILY    clopidogreL (PLAVIX) tablet 75 mg  75 mg Oral DAILY    ezetimibe (ZETIA) tablet 10 mg  10 mg Oral DAILY    fenofibrate (LOFIBRA) tablet 160 mg  160 mg Oral DAILY    isosorbide mononitrate ER (IMDUR) tablet 30 mg  30 mg Oral DAILY    lisinopriL (PRINIVIL, ZESTRIL) tablet 5 mg  5 mg Oral DAILY    nitroglycerin (NITROSTAT) tablet 0.4 mg  0.4 mg SubLINGual Q5MIN PRN    pantoprazole (PROTONIX) tablet 40 mg  40 mg Oral DAILY    temazepam (RESTORIL) capsule 30 mg  30 mg Oral QHS PRN    rosuvastatin (CRESTOR) tablet 40 mg  40 mg Oral QHS    sodium chloride (NS) flush 5-40 mL  5-40 mL IntraVENous Q8H    sodium chloride (NS) flush 5-40 mL  5-40 mL IntraVENous PRN    acetaminophen (TYLENOL) tablet 650 mg  650 mg Oral Q6H PRN    Or    acetaminophen (TYLENOL) suppository 650 mg  650 mg Rectal Q6H PRN    polyethylene glycol (MIRALAX) packet 17 g  17 g Oral DAILY PRN    enoxaparin (LOVENOX) injection 40 mg  40 mg SubCUTAneous DAILY    morphine injection 2 mg  2 mg IntraVENous Q4H PRN    metoprolol succinate (TOPROL-XL) XL tablet 25 mg  25 mg Oral DAILY WITH LUNCH    furosemide (LASIX) tablet 20 mg  20 mg Oral DAILY PRN    diclofenac (VOLTAREN) 1 % topical gel 2 g  2 g Topical TID PRN    citalopram (CELEXA) tablet 40 mg  40 mg Oral PRN    sotaloL (BETAPACE) tablet 40 mg  40 mg Oral Q12H     ______________________________________________________________________  EXPECTED LENGTH OF STAY: - - -  ACTUAL LENGTH OF STAY:          Sen Branch MD

## 2023-03-27 NOTE — PROGRESS NOTES
1800-Primary Nurse Margoth Lam RN and Alf RN performed a dual skin assessment on this patient No impairment noted

## 2023-03-27 NOTE — ED TRIAGE NOTES
Pt arrives from home with cc of intermittent chest pain. She says it began Friday but was trying anibal hold off coming in. She said the pain persisted. She was walking up the stairs when the pain became sharp and she also got winded which prompted her to come in today. No nausea, vomiting. The pain has lessened to more of a burning at this time. Last MI in 2013.  Has 6 stents

## 2023-03-27 NOTE — H&P
[address]  HISTORY AND PHYSICAL    Name:  Jayjay Amezquita  MR#:  [id number]  :  1965  ACCOUNT #:  [de-identified]  ADMIT DATE:  2023      The patient was seen, evaluated, and admitted by me on 2023. PRIMARY CARE PHYSICIAN:  Abby Cisneros MD    SOURCE OF INFORMATION:  The patient and review of ED and old electronic medical records. CHIEF COMPLAINT:  Chest pain. HISTORY OF PRESENT ILLNESS:  This is a 56-year-old woman with past medical history significant for coronary artery disease status post multiple stent placement, up to six stents; dyslipidemia; anxiety/depression; and hypertension, presented at the emergency room with chest pain. This has been going on for the past 3 days. Initially, the chest pain was intermittent but it has become constant in the last 24 hours. Pain is located at the center of the chest, constant burning pain, associated with shortness of breath but no radiation, no known aggravating or relieving factors, 9/10 in severity. The patient denies associated fever, rigors or chills. When the patient arrived at the emergency room, the first set of troponin was negative but because the patient has established coronary artery disease and she is status post multiple stent placement, she was referred to the hospitalist service for admission. She was last admitted to the hospital from 2021 to 2021. The patient at that time presented with chest pain, underwent stress test that was negative. PAST MEDICAL HISTORY:  1. Coronary artery disease, status post multiple stent placement. 2.  Dyslipidemia. 3.  Anxiety/depression. 4.  Hypertension. ALLERGIES:  THE PATIENT IS ALLERGIC TO DEMEROL, ERYTHROMYCIN, SULFADIAZINE. MEDICATIONS:  1. Xanax 1 mg 3 times daily as needed for anxiety. 2.  Aspirin 81 mg daily. 3.  Celexa, dosage as directed. 4.  Plavix 75 mg daily. 5.  Zetia 10 mg daily. 6.  Lofibra 160 mg daily.   7.  Lasix, dosage as directed. 8.  Imdur 30 mg daily. 9.  Potassium chloride 20 mEq as needed. 10.  Lisinopril 5 mg daily. 11. Antivert 25 mg 3 times daily as needed for dizziness. 12.  Lopressor 25 mg daily. 13.  Nitrostat 0.4 mg as needed for chest pain. 14.  Protonix 40 mg daily. 15.  Crestor 40 mg daily at bedtime. 16.  Sotalol   mg twice daily. 17.  Restoril 30 mg daily at bedtime as needed for sleep. FAMILY HISTORY:  This was reviewed. Her father had heart disease and dyslipidemia. PAST SURGICAL HISTORY:  This is significant for:  1.  section. 2.  Tubal ligation. 3.  Multiple cardiac stent placement. SOCIAL HISTORY:  The patient is a former smoker, quit tobacco abuse 2012. No history of alcohol abuse. REVIEW OF SYSTEMS:  HEAD, EYES, EARS, NOSE, AND THROAT:  No headache, no dizziness, no blurring of vision, no photophobia. RESPIRATORY SYSTEM:  This is positive for shortness of breath. No cough, no hemoptysis. CARDIOVASCULAR SYSTEM:  This is positive for chest pain. No orthopnea, no palpitations. GASTROINTESTINAL SYSTEM:  No nausea or vomiting. No diarrhea, no constipation. GENITOURINARY SYSTEM:  No dysuria, no urgency, no frequency. All other systems are reviewed and they are negative. PHYSICAL EXAMINATION:  GENERAL APPEARANCE:  The patient appeared ill, in moderate distress. VITAL SIGNS:  On arrival at the emergency room; temperature 98, pulse 70, respiratory rate 18, blood pressure 133/69, oxygen saturation 98%. HEENT:  Head:  Normocephalic, atraumatic. Eyes:  Normal eye movement. No redness, no drainage, no discharge. Ears:  Normal external ears with no obvious drainage. Nose:  No deformity, no drainage. Mouth and Throat:  No visible oral lesion. NECK:  Neck is supple. No JVD, no thyromegaly. CHEST:  Clear breath sounds. No wheezing, no crackles. HEART:  Normal S1 and S2, regular. No clinically appreciable murmur. ABDOMEN:  Soft, obese, nontender. Normal bowel sounds. CNS:  Alert and oriented x3. No gross focal neurological deficit. EXTREMITIES:  No edema. Pulses 2+ bilaterally. MUSCULOSKELETAL SYSTEM:  No obvious joint deformity or swelling. SKIN:  No active skin lesions seen in the exposed part of the body. PSYCHIATRY:  Normal mood and affect. LYMPHATIC SYSTEM:  No cervical lymphadenopathy. DIAGNOSTIC DATA:  EKG shows normal sinus rhythm. No significant ST or T-waves abnormalities. Chest x-ray:  No acute process. LABORATORY DATA:  Hematology:  WBC 6.5, hemoglobin 12.4, hematocrit 36.5, platelets 997. Chemistry:  Sodium 139, potassium 3.4, chloride 108, CO2 of 30, glucose 149, BUN 14, creatinine 0.87, calcium 9.7. Total bilirubin 0.4, ALT 29, AST 24, alkaline phosphatase 59, total protein 7.2, albumin level 3.9, globulin 3.8. Cardiac profile:  Troponin high-sensitivity 3. ASSESSMENT:  1. Chest pain. 2.  Hypokalemia. 3.  Hyperglycemia. 4.  Obesity, unspecified. 5.  Dyslipidemia. 6.  Anxiety/depression. 7.  Hypertension. PLAN:  1. Chest pain:  We will admit the patient for further evaluation and treatment. We will check serial cardiac markers to rule out acute myocardial infarction as a possible cause of the chest pain. We will also evaluate the patient for atypical causes of chest pain by checking a lipase level and D-dimer. The patient's Cardiology group will be consulted to assist in further evaluation and treatment. The patient will be n.p.o. in anticipation of intervention by the cardiologist.  We will carry out pain control with nitro and morphine. 2.  Hypokalemia: We will replete potassium and repeat the potassium level. We will also check magnesium level. 3.  Hyperglycemia: We will check hemoglobin A1c level. The patient has no history of diabetes. 4.  Obesity, unspecified:  The patient presented with BMI of 33.70. Diet and exercise advised. Consider inpatient dietary consult if indicated.   5. Dyslipidemia: We will continue preadmission medications. 6.  Anxiety/depression: We will also continue with preadmission medications. 7.  Hypertension: We will resume home medications and monitor the patient's blood pressure closely. OTHER ISSUES:  Code status: The patient is a full code. We will place the patient on Lovenox for DVT prophylaxis. FUNCTIONAL STATUS PRIOR TO ADMISSION:  The patient came from home. The patient is ambulatory with no assistive device. COVID PRECAUTION:  The patient was wearing a face mask. I was wearing a face mask and gloves for this patient's encounter.         Noman Nielsen MD      [id number]  D:  03/27/2023 5:27  T:  03/27/2023 8:05  JOB #:  [id number]  CC:  Xochitl Salvador MD

## 2023-03-27 NOTE — PROGRESS NOTES
Transition of Care Plan  RUR- 7%  DISPOSITION: TBD/subject to change pending medical progression; pending medical progression  Cardiology and Electrophysiology Consulted and Following  Patient going for cardiac catheterization today   F/U with PCP/Specialist    Patient establishing a new PCP. New PCP appointment has been scheduled 6/16/23 at 10:00 AM with Dr. Juliana Nicholas at [facility]. May need an earlier appointment for follow-up care at discharge. Transport: Family    CM will continue to follow and assist with RAVINDER needs as they arise. Reason for Admission:  Chest pain                     RUR Score:      7%               Plan for utilizing home health:     TBD/subject to change pending review and recommendations. Patient with no previous Hx of IPR/SNF/HH. [facility] [employer]     PCP: YES First and Last name:  Tom Cabezas MD     [facility] of Practice: [facility] a current patient: Yes/No: YES   Approximate date of last visit: Summer of 2022   Can you participate in a virtual visit with your PCP: No                    Current Advanced Directive/Advance Care Plan: Full Code/ Has ACP currently not on file. Healthcare Decision Maker:   Primary Decision Maker-Parents:  And Mrs Ken Cohen [phone]             56year old female, AOx4. Independent with ADL's and IADL's. No DME utilized. Resides in her own home. Patient's 22year old son resides with her. Patient receives [employer] since her heart attack as source of income. Insurance verified: [employer]. [facility] located off of [facility] is utilized for prescriptions. Care Management Interventions  PCP Verified by CM: Yes  Palliative Care Criteria Met (RRAT>21 & CHF Dx)?: No  Mode of Transport at Discharge:  Other (see comment) (Family)  Transition of Care Consult (CM Consult): Discharge Planning  Discharge Durable Medical Equipment: No  Physical Therapy Consult: No  Occupational Therapy Consult: No  Speech Therapy Consult: No  Support Systems: Child(anamaria), Parent(s)  Confirm Follow Up Transport: Family  Discharge Location  Patient Expects to be Discharged to[de-identified] Home with family assistance (TBD/subject to change pending review and recommendations)    JADIEL Howell/NANETTE  Care Management  11:29 AM

## 2023-03-27 NOTE — PROGRESS NOTES
1915-Bedside shift change report given to Ivanna (oncoming nurse) by VIVEK Santiago (offgoing nurse). Report included the following information SBAR, Kardex, Procedure Summary, MAR, and Recent Results.

## 2023-03-27 NOTE — ROUTINE PROCESS
1220    Cardiac Cath Lab Recovery Arrival Note:      Tammy Kawasaki arrived to Cardiac Cath Lab, Recovery Area. Staff introduced to patient. Patient identifiers verified with NAME and DATE OF BIRTH. Procedure verified with patient. Consent forms reviewed and signed by patient or authorized representative and verified. Allergies verified. Patient and family oriented to department. Patient and family informed of procedure and plan of care. Questions answered with review. Patient prepped for procedure, per orders from physician, prior to arrival.    Patient on cardiac monitor, non-invasive blood pressure, SPO2 monitor. On room air. Patient is A&Ox 4. Patient reports some burning in chest.     Patient in stretcher, in low position, with side rails up, call bell within reach, patient instructed to call if assistance as needed. Patient prep in: [address], [address]. Patient family has pager #  And Mrs. Fabricio Bowen [phone] (Q) (may provide pt information to these individuals)  Family in: At home.    Prep by: Audrey Miles

## 2023-03-27 NOTE — ED PROVIDER NOTES
70-year-old female with history of congenital hyperlipidemia, CAD status post 6 stents, HTN presents to the ED with chief complaint of intermittent chest pain for the past 3 days. Pain is sharp, initially happened while she was lying in bed but for the past 1 to 2 days has noticed it with exertion and reports associated dyspnea on exertion. No fevers, chills, abdominal pain, leg swelling, urinary symptoms, bowel symptoms. Has taken a full dose of aspirin today and has been compliant with her Plavix as well. Last stents were in . The history is provided by the patient. Chest Pain (Angina)   Associated symptoms include shortness of breath.       Past Medical History:   Diagnosis Date    Bigeminal rhythm 2021    CAD (coronary artery disease)     Gastrointestinal disorder     hiatal hernia    Hyperlipidemia     genetic    Hypertension     Ill-defined condition     vertigo    MI (myocardial infarction) ([facility] [address]     PVC (premature ventricular contraction)     SVT (supraventricular tachycardia) ([facility])        Past Surgical History:   Procedure Laterality Date    HX  SECTION      three    HX TUBAL LIGATION      ME UNLISTED PROCEDURE CARDIAC SURGERY  14    stents x 3    ME UNLISTED PROCEDURE CARDIAC SURGERY  10/2013    stents x 3         Family History:   Problem Relation Age of Onset    Elevated Lipids Father     Heart Disease Father 46        heart attack    Elevated Lipids Brother     Elevated Lipids Paternal Grandfather     Heart Disease Paternal Grandfather     Hypertension Paternal Grandfather     Stroke Paternal Grandfather        Social History     Socioeconomic History    Marital status: SINGLE     Spouse name: Not on file    Number of children: Not on file    Years of education: Not on file    Highest education level: Not on file   Occupational History    Not on file   Tobacco Use    Smoking status: Former     Packs/day: 2.00     Years: 30.00     Pack years: 60.00     Types: Cigarettes Quit date: 6/1/2012     Years since quitting: 10.8    Smokeless tobacco: Never   Vaping Use    Vaping Use: Never used   Substance and Sexual Activity    Alcohol use: No     Alcohol/week: 0.0 standard drinks    Drug use: Never    Sexual activity: Yes     Partners: Male   Other Topics Concern    Not on file   Social History Narrative    Not on file     Social Determinants of Health     Financial Resource Strain: Not on file   Food Insecurity: Not on file   Transportation Needs: Not on file   Physical Activity: Not on file   Stress: Not on file   Social Connections: Not on file   Intimate Partner Violence: Not on file   Housing Stability: Not on file         ALLERGIES: Demerol [meperidine], Erythromycin, and Sulfadiazine    Review of Systems   Respiratory:  Positive for shortness of breath. Cardiovascular:  Positive for chest pain. Vitals:    03/26/23 2209   BP: 133/69   Pulse: 70   Resp: 18   Temp: 98 °F (36.7 °C)   SpO2: 98%            Physical Exam  Constitutional:       General: She is not in acute distress. Appearance: She is well-developed. HENT:      Head: Normocephalic and atraumatic. Eyes:      General: No scleral icterus. Pupils: Pupils are equal, round, and reactive to light. Neck:      Trachea: No tracheal deviation. Cardiovascular:      Rate and Rhythm: Normal rate and regular rhythm. Heart sounds: No murmur heard. No friction rub. No gallop. Pulmonary:      Effort: Pulmonary effort is normal. No respiratory distress. Breath sounds: Normal breath sounds. No wheezing or rales. Abdominal:      General: Bowel sounds are normal. There is no distension. Palpations: Abdomen is soft. Tenderness: There is no abdominal tenderness. Musculoskeletal:         General: No deformity. Cervical back: Neck supple. Skin:     General: Skin is warm and dry. Neurological:      Mental Status: She is alert and oriented to person, place, and time.    Psychiatric: Behavior: Behavior normal.        Medical Decision Making  82-year-old female presenting to the ED with chest pain. Differential includes ACS, musculoskeletal pain, GERD, pneumonia. Will check basic labs, troponin. EKG without evidence of acute ischemia. Heart score is 4, moderate risk. Offered admission given this and patient says she is not comfortable going home. We will plan on admission for chest pain observation and cardiology evaluation in the morning. Amount and/or Complexity of Data Reviewed  Labs: ordered. Radiology: ordered. ECG/medicine tests: ordered and independent interpretation performed. Decision-making details documented in ED Course. Risk  Decision regarding hospitalization. ED Course as of 03/26/23 2243   [facility] Mar 26, 2023   2212 EKG shows a normal sinus rhythm with a rate of 74 no ST or T wave abnormalities to suggest ischemia or infarct. Normal intervals, normal axis. [DA]      ED Course User Index  [DA] Michaela Dee MD       Procedures    1:04 AM    Admission Note:  Patient is being admitted to the hospital, Service: Hospitalist.  The results of their tests and reasons for their admission have been discussed with them and available family. They convey agreement and understanding for the need to be admitted and for their admission diagnosis. LABORATORY TESTS:  Labs Reviewed   METABOLIC PANEL, COMPREHENSIVE - Abnormal; Notable for the following components:       Result Value    Potassium 3.4 (*)     Anion gap 1 (*)     Glucose 149 (*)     All other components within normal limits   CBC WITH AUTOMATED DIFF   TROPONIN-HIGH SENSITIVITY   SAMPLES BEING HELD   TROPONIN-HIGH SENSITIVITY       IMAGING RESULTS:  XR CHEST PORT    Result Date: 3/26/2023  INDICATION: chest pain  EXAM:  AP CHEST RADIOGRAPH COMPARISON: February 24, 2022 FINDINGS: AP portable view of the chest demonstrates a normal cardiomediastinal silhouette.  There is no edema, effusion, consolidation, or pneumothorax. The osseous structures are unremarkable. No acute process. MEDICATIONS GIVEN:  Medications - No data to display    IMPRESSION:  1. Chest pain, unspecified type        PLAN:  - Admit to hospitalist    CONDITION:  stable    Hospitalist Perfect Serve for Admission  1:04 AM    ED Room Number: ER26/26  Patient Name and age: Ravinder Calderon 56 y.o.  female  Working Diagnosis:   1. Chest pain, unspecified type        COVID-19 Suspicion:  no    Code Status:  Full Code  Readmission: no  Isolation Requirements:  no  Recommended Level of Care:  med/surg  Department:[facility] ED - [phone]  Other: History of congenital hyperlipidemia and CAD status post 6 stents. Sees Dr. Patricia Kramer. Last cardiac work-up in 2013 with most recent stents. Intermittent chest pain for the past 3 days but worse tonight and seems more exertional with associated dyspnea on exertion. No chest pain right now, negative troponin x2. Moderate risk heart score.   Would like to admit for chest pain observation and cardiology evaluation +/- stress test.    Signed By: Ramonita Lima MD     March 27, 2023

## 2023-03-27 NOTE — CONSULTS
[facility]                    Cardiology Care Note     [x]Initial Encounter     []Follow-up    Patient Name: Aris Almanzar - :1965 - JXE:[id number]  Primary Cardiologist: Dr. Sean Avery, Dr. Chetan Johnson and Dr. Vergara [facility] Cardiologist: Dr. Portia Huntley MD     Reason for encounter: chest pain                    CARDIOLOGY ATTENDING ATTESTATION    CONSULT/PROGRESS NOTE      Patient seen on the day of progress note and examined  and agree with Advance Practice Provider (SILVESTRE, NP,PA)  assessment and plans. Time spent on patient's care >60% of entire combined MD/SILVESTRE evaluation/treatment      Brief HPI: 56 y.o. female with PMH significant for CAD s/p multliple stents (last stent ,  last cath  Dr. Hugh Solano, nonobstructive CAD 30% ostial RCA nonobstructive), pvcs/NSVT s/p ablation pvcs , familial HLD, HTN, GERD. She presents with chief c/o chest pain. Reports she has had 4 episodes of midsternal chest pain since Friday. Stats each episode lasted approximately 3 min. Pain was nonradiating. She took a nitor on Friday which helped. Had palpitations with episode on Saturday. The last episode was yesterday which occurred while climbing stairs. She had associated nausea and SOB. She sat down and took ASA. States that the pain is different from pain she had with her MI. No history of orthopnea, no BLE edema. She came to ER. In ER, she was found to have serial troponins x 3 which were normal (3, <3, 4). Pro . EKG with no ischemic findings. She reports compliance with meds. Of noted, pt has history of cardiac MRI in 2022 with scar in basal to mid inferolat wall consistent with MI  Last lexiscan stress test 2021 with no ischemia or infarct. SH: former smoker, no ETOH use      A/P: 1.  CAD: Patient presents with recurrent chest pain which she did not very significant.   Also reports significant dyspnea with mild to moderate activities. This has been ongoing for the last week or so. She tells me that the chest discomfort is extremely concerning to her she seems extremely anxious. He does have a history of PCI the last 1 of which was in 2013. Last cardiac catheterization report in 2017 with nonobstructive CAD. Troponins are within normal limits. EKG with no acute ischemic changes. I have had a long conversation with the patient regarding different options. Stress test has been discussed but she wants to avoid that she feels that would not be able to do it. Also discussed again cardiac catheterization which in her particular case given clinical presentation will be reasonable. She understands risks and benefits of cardiac catheterization. Risks including but not limited to CVA, death, MI, peripheral vascular injury, acute renal failure emergency bypass surgery. He is agreeable to proceed with cardiac catheterization at this time understanding and not necessarily flow-limiting stenosis will be found. In the meanwhile continue aspirin Plavix Imdur Toprol. 2.  History of PVCs-NSVT status post PVC ablation 2017. No major rhythm issues occurring at this time. Continue with sotalol. Continue Toprol. BP Readings from Last 3 Encounters:   03/27/23 107/73   03/06/23 116/61   03/17/22 112/82       Pulse Readings from Last 3 Encounters:   03/27/23 63   03/06/23 60   03/17/22 73       Visit Vitals  /73 (BP 1 Location: Left upper arm, BP Patient Position: At rest)   Pulse 63   Temp 98 °F (36.7 °C)   Resp 18   Ht 5' 3\" (1.6 m)   Wt 190 lb 4.1 oz (86.3 kg)   SpO2 98%   BMI 33.70 kg/m²     General Appearance:  Well developed, well nourished,alert and oriented x 3, and individual in no acute distress. Ears/Nose/Mouth/Throat:   Hearing grossly normal.         Neck: Supple. Chest:   Lungs clear to auscultation bilaterally. Cardiovascular:  Regular rate and rhythm, S1, S2 normal, no murmur. Abdomen:   Soft, non-tender, bowel sounds are active. Extremities: No edema bilaterally. Skin: Warm and dry. 09/15/20    ECHO ADULT COMPLETE 09/16/2020 9/16/2020    Interpretation Summary  · LV: Estimated LVEF is 55 - 60%. Normal cavity size, wall thickness and systolic function (ejection fraction normal). Mild (grade 1) left ventricular diastolic dysfunction. Signed by: Akhil Lindo MD on 9/16/2020 10:30 AM        Lab Results   Component Value Date/Time    CK 58 01/29/2014 12:41 AM    CK - MB <0.5 (L) 01/29/2014 12:41 AM    CK-MB Index CANNOT BE CALCULATED 01/29/2014 12:41 AM    Troponin-I, Qt. <0.05 08/20/2021 05:40 AM    BNP 65 12/09/2013 12:30 AM       Lab Results   Component Value Date/Time    Creatinine (POC) 1.0 11/08/2015 09:12 PM    Creatinine 0.87 03/26/2023 10:15 PM       Lab Results   Component Value Date/Time    Hemoglobin (POC) 11.6 11/08/2015 09:12 PM    HGB 12.4 03/26/2023 10:15 PM                         HPI:   Ravinder Calderon is a 56 y.o. female with PMH significant for CAD s/p multliple stents (last stent 2013,  last cath 2017 Dr. Adrien Hurley, nonobstructive CAD 30% ostial RCA nonobstructive), pvcs/NSVT s/p ablation pvcs 2017, familial HLD, HTN, GERD. She presents with chief c/o chest pain. Reports she has had 4 episodes of midsternal chest pain since Friday. Stats each episode lasted approximately 3 min. Pain was nonradiating. She took a nitor on Friday which helped. Had palpitations with episode on Saturday. The last episode was yesterday which occurred while climbing stairs. She had associated nausea and SOB. She sat down and took ASA. States that the pain is different from pain she had with her MI. No history of orthopnea, no BLE edema. She came to ER. In ER, she was found to have serial troponins x 3 which were normal (3, <3, 4). Pro . EKG with no ischemic findings. She reports compliance with meds.    Of noted, pt has history of cardiac MRI in 2/2022 with scar in basal to mid inferolat wall consistent with MI  Last lexiscan stress test 8/2021 with no ischemia or infarct. SH: former smoker, no ETOH use    Subjective:  She currently denies chest pain, SOB, palpitations. She is in NSR, no PVCs noted. She is requesting an echo. She denies any chest pain currently. She is adverse to lexiscan stress test stating she did not tolerate in past due to anxiety, felt bad. She prefers cath. Assessment and Plan        Chest pain: some typical features. Ruled out for MI with serial normal troponins and no ischemia on EKG. She refuses lexiscan stress test.  Will proceed with [facility] this afternoon with Dr.Manu Moraes. Keep NPO. Echo ordered per primary team.     History of CAD: s/p multiple stents. Last cath 2017 with 30% ostial RCA per [facility] report. Continue ASA, plavix, imdur, toprol XL. Nitro prn. History of PVC, NSVT s/p PVC ablation 2017: follows with Dr. Nico Bhakta and Dr. Terence Pagan but states she is planning to transition to Dr. Nico Bhakta only. Continue sotolol (40 mg BID ordered as inpatient pharmacy does not carry 60 mg dosing option). She is in NSR with no PVCs. Continue also toprol XL 25 mg daily. History of familial HLD: states she had genetic testing at [facility] which confirmed familial HLD. Continue fenofibrate, crestor, zetia. 5. History of HTN: bp controlled. Continue lisinopril, toprol XL       ____________________________________________________________    Cardiac testing  09/15/20    ECHO ADULT COMPLETE 09/16/2020 9/16/2020    Interpretation Summary  · LV: Estimated LVEF is 55 - 60%. Normal cavity size, wall thickness and systolic function (ejection fraction normal). Mild (grade 1) left ventricular diastolic dysfunction.     Signed by: Junior Muñoz MD on 9/16/2020 10:30 AM      08/19/21    NUCLEAR CARDIAC STRESS TEST 08/20/2021, 08/20/2021 8/20/2021    Interpretation Summary  · Baseline ECG: Normal sinus rhythm, non-specific ST-T wave abnormalities. · Stress test: Inconclusive stress test.    Indication: Chest pain. Patient was stressed on a treadmill and achieved 76% of predicted maximum heart rate. A myocardial SPECT gated wall motion study was performed utilizing 11 mCi of Tc MYOVIEW for rest and 1 mCi for stress. SPECT imaging at stress and rest demonstrates no definite evidence of ischemia and/or infarction. Left ventricular ejection fraction equals 67%. Left ventricular wall motion and thickening are within normal limits. Impression  :  1. At the level of exercise achieved, there is no definite evidence of ischemia and/or infarction. 2. LVEF equals 67%. Left ventricular wall motion and thickening is normal.    Signed by: Chanel Merlos MD on 8/20/2021 12:03 PM, Signed by: Avery Tello MD on 8/20/2021 12:50 PM    1. The left ventricle is normal in cavity size and wall thickness. Global systolic function is normal. The   LV ejection fraction is 63%. Minimal decreased thickening is seen along the basilar inferolateral wall. 2. The right ventricle is normal in cavity size, wall thickness, and systolic function. 3. Both atria are normal in size. 4. The aortic valve is trileaflet in morphology. There is no significant valvular disease. 5. Delayed enhancement imaging demonstrates  subendocardial based enhancement in infero-lateral  wall from   base to mid segment of left ventricle. The  native myocardial native T 1 values are elevated (1500 ms) in   this region. The native myocardial T2 values in this region are normal and suggest against an acute   process. This ischemic pattern of enhancement in a coronary distribution is consistent with myocardial   infarct. Nonspecific area of apparent enhancement along the mid lateral LV wall may be artifact due to   blood pooling.        Most recent HS troponins:  Recent Labs     03/27/23  0554 03/27/23  0005 03/26/23  2215   TROPHS 4 <3 3       ECG: normal EKG, normal sinus rhythm, unchanged from previous tracings    Review of Systems:    []All other systems reviewed and all negative except as written in HPI    [] Patient unable to provide secondary to condition    Past Medical History:   Diagnosis Date    Bigeminal rhythm 2021    CAD (coronary artery disease)     Gastrointestinal disorder     hiatal hernia    Hyperlipidemia     genetic    Hypertension     Ill-defined condition     vertigo    MI (myocardial infarction) ([facility] [address]     PVC (premature ventricular contraction)     SVT (supraventricular tachycardia) ([facility] [address]      Past Surgical History:   Procedure Laterality Date    HX  SECTION      three    HX TUBAL LIGATION      MS UNLISTED PROCEDURE CARDIAC SURGERY  12    stents x 3    MS UNLISTED PROCEDURE CARDIAC SURGERY  10/2013    stents x 3     Social Hx:  reports that she quit smoking about 10 years ago. Her smoking use included cigarettes. She has a 60.00 pack-year smoking history. She has never used smokeless tobacco. She reports that she does not drink alcohol and does not use drugs. Family Hx: family history includes Elevated Lipids in her brother, father, and paternal grandfather; Heart Disease in her paternal grandfather; Heart Disease (age of onset: 46) in her father; Hypertension in her paternal grandfather; Stroke in her paternal grandfather. Allergies   Allergen Reactions    Demerol [Meperidine] Other (comments)     Hallucinations     Erythromycin Anaphylaxis    Sulfadiazine Itching     Reaction Type:  Allergy          OBJECTIVE:  Wt Readings from Last 3 Encounters:   23 86.3 kg (190 lb 4.1 oz)   23 81.6 kg (180 lb)   22 83.5 kg (184 lb)     No intake or output data in the 24 hours ending 23 1032    Physical Exam:    Vitals:   Vitals:    23 0332 23 0436 23 0615 23 0832   BP: 100/62 104/65 117/63 107/73   Pulse:  65 66 63   Resp: 17 16 17 18   Temp:       SpO2: 95% 95% 99% 98%   Weight:       Height: Telemetry: normal sinus rhythm    Gen: Well-developed, well-nourished, in no acute distress  Neck: Supple, No JVD, No Carotid Bruit  Resp: No accessory muscle use, Clear breath sounds, No rales or rhonchi  Card: Regular Rate,Rythm, Normal S1, S2, No murmurs, rubs or gallop. No thrills. Abd:   Soft, non-tender, non-distended, BS+   MSK: No cyanosis  Skin: No rashes    Neuro: Moving all four extremities, follows commands appropriately  Psych: Good insight, oriented to person, place, alert, Nml Affect  LE: No edema    Data Review:     Radiology:   XR Results (most recent):  Results from Hospital Encounter encounter on 03/26/23    XR CHEST PORT    Narrative  INDICATION: chest pain    EXAM:  AP CHEST RADIOGRAPH    COMPARISON: February 24, 2022    FINDINGS:    AP portable view of the chest demonstrates a normal cardiomediastinal  silhouette. There is no edema, effusion, consolidation, or pneumothorax. The  osseous structures are unremarkable. Impression  No acute process. Recent Labs     03/27/23  0554 03/26/23  2215   NA  --  139   K  --  3.4*   CL  --  108   CO2  --  30   BUN  --  14   CREA  --  0.87   GLU  --  149*   PHOS 4.2  --    CA  --  9.7     Recent Labs     03/26/23  2215   WBC 6.5   HGB 12.4   HCT 36.5        Recent Labs     03/26/23  2215   AP 59     No results for input(s): CHOL, LDLC in the last 72 hours.     No lab exists for component: TGL, HDLC,  HBA1C      Current meds:    Current Facility-Administered Medications:     ALPRAZolam (XANAX) tablet 1 mg, 1 mg, Oral, TID PRN, Junior Baez MD    ARIPiprazole (ABILIFY) tablet 2 mg, 2 mg, Oral, DAILY, Junior Baez MD    aspirin delayed-release tablet 162 mg, 162 mg, Oral, DAILY, Junior Baez MD, 162 mg at 03/27/23 1012    clopidogreL (PLAVIX) tablet 75 mg, 75 mg, Oral, DAILY, Junior Baez MD, 75 mg at 03/27/23 1013    ezetimibe (ZETIA) tablet 10 mg, 10 mg, Oral, DAILY, Junior Baez MD, 10 mg at 03/27/23 1011 fenofibrate (LOFIBRA) tablet 160 mg, 160 mg, Oral, DAILY, Junior Baez MD, 160 mg at 03/27/23 1012    isosorbide mononitrate ER (IMDUR) tablet 30 mg, 30 mg, Oral, DAILY, Junior Baez MD, 30 mg at 03/27/23 1013    lisinopriL (PRINIVIL, ZESTRIL) tablet 5 mg, 5 mg, Oral, DAILY, Junior Baez MD, 5 mg at 03/27/23 1013    nitroglycerin (NITROSTAT) tablet 0.4 mg, 0.4 mg, SubLINGual, Q5MIN PRN, Junior Baez MD    pantoprazole (PROTONIX) tablet 40 mg, 40 mg, Oral, DAILY, Junior Baez MD    temazepam (RESTORIL) capsule 30 mg, 30 mg, Oral, QHS PRN, Junior Baez MD    rosuvastatin (CRESTOR) tablet 40 mg, 40 mg, Oral, QHS, Junior Baez MD    sodium chloride (NS) flush 5-40 mL, 5-40 mL, IntraVENous, Q8H, Junior Baez MD, 10 mL at 03/27/23 0600    sodium chloride (NS) flush 5-40 mL, 5-40 mL, IntraVENous, PRN, Junior Baez MD    acetaminophen (TYLENOL) tablet 650 mg, 650 mg, Oral, Q6H PRN **OR** acetaminophen (TYLENOL) suppository 650 mg, 650 mg, Rectal, Q6H PRN, Junior Baez MD    polyethylene glycol (MIRALAX) packet 17 g, 17 g, Oral, DAILY PRN, Junior Baez MD    ondansetron (ZOFRAN ODT) tablet 4 mg, 4 mg, Oral, Q8H PRN **OR** ondansetron (ZOFRAN) injection 4 mg, 4 mg, IntraVENous, Q6H PRN, Junior Baez MD    enoxaparin (LOVENOX) injection 40 mg, 40 mg, SubCUTAneous, DAILY, Junior Baez MD    morphine injection 2 mg, 2 mg, IntraVENous, Q4H PRN, Junior Baez MD    metoprolol succinate (TOPROL-XL) XL tablet 25 mg, 25 mg, Oral, DAILY WITH LUNCH, Remi Jacques., NP    furosemide (LASIX) tablet 20 mg, 20 mg, Oral, DAILY PRN, Yuli Cesar MD    diclofenac (VOLTAREN) 1 % topical gel 2 g, 2 g, Topical, TID PRN, Yuli Cesar MD    citalopram (CELEXA) tablet 40 mg, 40 mg, Oral, PRN, Yuli Cesar MD    Current Outpatient Medications:     ARIPiprazole (ABILIFY) 2 mg tablet, , Disp: , Rfl:     lidocaine (LIDODERM) 5 %, Apply 1 Patch to affected area daily. , Disp: , Rfl:     isosorbide mononitrate ER (IMDUR) 30 mg tablet, Take 1 Tablet by mouth daily. (Patient taking differently: Take 30 mg by mouth as needed.), Disp: 90 Tablet, Rfl: 1    citalopram (CELEXA) 40 mg tablet, Take 40 mg by mouth as needed. , Disp: , Rfl:     nitroglycerin (NITROSTAT) 0.4 mg SL tablet, 0.4 mg by SubLINGual route every five (5) minutes as needed. , Disp: , Rfl:     metoprolol tartrate (LOPRESSOR) 25 mg tablet, Take 25 mg by mouth daily. , Disp: , Rfl:     diclofenac (VOLTAREN) 1 % gel, APPLY 1 GRAM TO AFFECTED AREA 3 TIMES A DAY AS NEEDED, Disp: , Rfl: 1    pantoprazole (PROTONIX) 40 mg tablet, Take 40 mg by mouth daily. , Disp: , Rfl:     ALPRAZolam (XANAX) 1 mg tablet, Take 1 mg by mouth three (3) times daily as needed for Anxiety. , Disp: , Rfl:     ezetimibe (ZETIA) 10 mg tablet, Take 10 mg by mouth daily. , Disp: , Rfl:     aspirin delayed-release 81 mg tablet, TAKE 2 TABLETS BY MOUTH DAILY, Disp: 60 Tab, Rfl: 5    SOTALOL HCL (SOTALOL PO), Take 60 mg by mouth two (2) times a day., Disp: , Rfl:     temazepam (RESTORIL) 30 mg capsule, Take 30 mg by mouth nightly as needed for Sleep., Disp: , Rfl:     rosuvastatin (CRESTOR) 40 mg tablet, Take 40 mg by mouth nightly., Disp: , Rfl:     fenofibrate (LOFIBRA) 160 mg tablet, Take 1 Tab by mouth daily. , Disp: 90 Tab, Rfl: 2    lisinopril (PRINIVIL, ZESTRIL) 5 mg tablet, Take 1 Tab by mouth daily. , Disp: 90 Tab, Rfl: 3    clopidogrel (PLAVIX) 75 mg tablet, Take 1 Tab by mouth daily. , Disp: 90 Tab, Rfl: 3    furosemide (LASIX) 20 mg tablet, TAKE 1 TAB BY MOUTH EVERY DAY AS NEEDED FOR EDEMA (TAKE ADDITIONAL TAB OF POTASSIUM WHEN USING THIS), Disp: , Rfl:     Klor-Con M20 20 mEq tablet, Take 20 mEq by mouth as needed. Taking 10 meq daily, Disp: , Rfl:     meclizine (ANTIVERT) 25 mg tablet, Take 25 mg by mouth three (3) times daily as needed for Dizziness.  (Patient not taking: No sig reported), Disp: , Rfl:     ondansetron (ZOFRAN ODT) 8 mg disintegrating tablet, Take 1 Tab by mouth every eight (8) hours as needed for Nausea., Disp: 15 Tab, Rfl: 0    Jossy Jacques NP    [facility]  Call center: Cliff Mcardle) [phone] (j) [phone]      Malik Bernard MD

## 2023-03-27 NOTE — PROGRESS NOTES
Patient seen by Dr. Layla Velez. Will proceed with [facility] this afternoon. Please keep NPO. Full consult note to follow.

## 2023-03-27 NOTE — PROGRESS NOTES
1640  TRANSFER - OUT REPORT:    Verbal report given to COLIN Blakely(name) on Wero Espinosa  being transferred to  (unit) for pt observation before procedure. Report consisted of patients Situation, Background, Assessment and   Recommendations(SBAR). Information from the following report(s) SBAR, Procedure Summary, Intake/Output, MAR, Recent Results, and Cardiac Rhythm NSR  was reviewed with the receiving nurse. Lines:   Peripheral IV 03/26/23 Right Antecubital (Active)   Site Assessment Clean, dry, & intact 03/27/23 1220   Phlebitis Assessment 0 03/27/23 1220   Infiltration Assessment 0 03/27/23 1220   Dressing Status Clean, dry, & intact 03/27/23 1220   Dressing Type Transparent 03/27/23 1220   Hub Color/Line Status Pink;Flushed; Infusing 03/27/23 1220   Action Taken Open ports on tubing capped 03/27/23 1220   Alcohol Cap Used Yes 03/27/23 1220        Opportunity for questions and clarification was provided.       Patient transported with:   Monitor  Registered Nurse and pt belongings including purse and cell phone

## 2023-03-28 VITALS
RESPIRATION RATE: 21 BRPM | TEMPERATURE: 98.1 F | OXYGEN SATURATION: 94 % | HEIGHT: 63 IN | HEART RATE: 68 BPM | DIASTOLIC BLOOD PRESSURE: 54 MMHG | BODY MASS INDEX: 31.02 KG/M2 | SYSTOLIC BLOOD PRESSURE: 116 MMHG | WEIGHT: 175.1 LBS

## 2023-03-28 LAB
ALBUMIN SERPL-MCNC: 3.4 G/DL (ref 3.5–5)
ALBUMIN/GLOB SERPL: 1.2 (ref 1.1–2.2)
ALP SERPL-CCNC: 52 U/L (ref 45–117)
ALT SERPL-CCNC: 27 U/L (ref 12–78)
ANION GAP SERPL CALC-SCNC: 5 MMOL/L (ref 5–15)
AST SERPL-CCNC: 27 U/L (ref 15–37)
BASOPHILS # BLD: 0.1 K/UL (ref 0–0.1)
BASOPHILS NFR BLD: 1 % (ref 0–1)
BILIRUB SERPL-MCNC: 0.4 MG/DL (ref 0.2–1)
BUN SERPL-MCNC: 15 MG/DL (ref 6–20)
BUN/CREAT SERPL: 22 (ref 12–20)
CALCIUM SERPL-MCNC: 9 MG/DL (ref 8.5–10.1)
CHLORIDE SERPL-SCNC: 112 MMOL/L (ref 97–108)
CO2 SERPL-SCNC: 25 MMOL/L (ref 21–32)
CREAT SERPL-MCNC: 0.69 MG/DL (ref 0.55–1.02)
DIFFERENTIAL METHOD BLD: ABNORMAL
EOSINOPHIL # BLD: 0.2 K/UL (ref 0–0.4)
EOSINOPHIL NFR BLD: 5 % (ref 0–7)
ERYTHROCYTE [DISTWIDTH] IN BLOOD BY AUTOMATED COUNT: 13 % (ref 11.5–14.5)
GLOBULIN SER CALC-MCNC: 2.9 G/DL (ref 2–4)
GLUCOSE SERPL-MCNC: 96 MG/DL (ref 65–100)
HCT VFR BLD AUTO: 33.1 % (ref 35–47)
HGB BLD-MCNC: 11.3 G/DL (ref 11.5–16)
IMM GRANULOCYTES # BLD AUTO: 0 K/UL (ref 0–0.04)
IMM GRANULOCYTES NFR BLD AUTO: 0 % (ref 0–0.5)
LYMPHOCYTES # BLD: 2.1 K/UL (ref 0.8–3.5)
LYMPHOCYTES NFR BLD: 46 % (ref 12–49)
MCH RBC QN AUTO: 31.7 PG (ref 26–34)
MCHC RBC AUTO-ENTMCNC: 34.1 G/DL (ref 30–36.5)
MCV RBC AUTO: 92.7 FL (ref 80–99)
MONOCYTES # BLD: 0.4 K/UL (ref 0–1)
MONOCYTES NFR BLD: 8 % (ref 5–13)
NEUTS SEG # BLD: 1.8 K/UL (ref 1.8–8)
NEUTS SEG NFR BLD: 40 % (ref 32–75)
NRBC # BLD: 0 K/UL (ref 0–0.01)
NRBC BLD-RTO: 0 PER 100 WBC
PLATELET # BLD AUTO: 193 K/UL (ref 150–400)
PMV BLD AUTO: 10.7 FL (ref 8.9–12.9)
POTASSIUM SERPL-SCNC: 3.9 MMOL/L (ref 3.5–5.1)
PROT SERPL-MCNC: 6.3 G/DL (ref 6.4–8.2)
RBC # BLD AUTO: 3.57 M/UL (ref 3.8–5.2)
SODIUM SERPL-SCNC: 142 MMOL/L (ref 136–145)
WBC # BLD AUTO: 4.5 K/UL (ref 3.6–11)

## 2023-03-28 PROCEDURE — 4A023N7 MEASUREMENT OF CARDIAC SAMPLING AND PRESSURE, LEFT HEART, PERCUTANEOUS APPROACH: ICD-10-PCS | Performed by: INTERNAL MEDICINE

## 2023-03-28 PROCEDURE — 74011000636 HC RX REV CODE- 636: Performed by: INTERNAL MEDICINE

## 2023-03-28 PROCEDURE — 74011250636 HC RX REV CODE- 250/636: Performed by: INTERNAL MEDICINE

## 2023-03-28 PROCEDURE — 93458 L HRT ARTERY/VENTRICLE ANGIO: CPT | Performed by: INTERNAL MEDICINE

## 2023-03-28 PROCEDURE — C1894 INTRO/SHEATH, NON-LASER: HCPCS | Performed by: INTERNAL MEDICINE

## 2023-03-28 PROCEDURE — 77030013744: Performed by: INTERNAL MEDICINE

## 2023-03-28 PROCEDURE — B2111ZZ FLUOROSCOPY OF MULTIPLE CORONARY ARTERIES USING LOW OSMOLAR CONTRAST: ICD-10-PCS | Performed by: INTERNAL MEDICINE

## 2023-03-28 PROCEDURE — 77030040934 HC CATH DIAG DXTERITY MEDT -A: Performed by: INTERNAL MEDICINE

## 2023-03-28 PROCEDURE — 36415 COLL VENOUS BLD VENIPUNCTURE: CPT

## 2023-03-28 PROCEDURE — 80053 COMPREHEN METABOLIC PANEL: CPT

## 2023-03-28 PROCEDURE — 74011250637 HC RX REV CODE- 250/637: Performed by: NURSE PRACTITIONER

## 2023-03-28 PROCEDURE — 99152 MOD SED SAME PHYS/QHP 5/>YRS: CPT | Performed by: INTERNAL MEDICINE

## 2023-03-28 PROCEDURE — 74011250637 HC RX REV CODE- 250/637: Performed by: INTERNAL MEDICINE

## 2023-03-28 PROCEDURE — 74011000250 HC RX REV CODE- 250: Performed by: INTERNAL MEDICINE

## 2023-03-28 PROCEDURE — 85025 COMPLETE CBC W/AUTO DIFF WBC: CPT

## 2023-03-28 PROCEDURE — 77030019569 HC BND COMPR RAD TERU -B: Performed by: INTERNAL MEDICINE

## 2023-03-28 RX ORDER — HEPARIN SODIUM 1000 [USP'U]/ML
INJECTION, SOLUTION INTRAVENOUS; SUBCUTANEOUS AS NEEDED
Status: DISCONTINUED | OUTPATIENT
Start: 2023-03-28 | End: 2023-03-28 | Stop reason: HOSPADM

## 2023-03-28 RX ORDER — LIDOCAINE HYDROCHLORIDE 10 MG/ML
INJECTION INFILTRATION; PERINEURAL AS NEEDED
Status: DISCONTINUED | OUTPATIENT
Start: 2023-03-28 | End: 2023-03-28 | Stop reason: HOSPADM

## 2023-03-28 RX ORDER — FENTANYL CITRATE 50 UG/ML
INJECTION, SOLUTION INTRAMUSCULAR; INTRAVENOUS AS NEEDED
Status: DISCONTINUED | OUTPATIENT
Start: 2023-03-28 | End: 2023-03-28 | Stop reason: HOSPADM

## 2023-03-28 RX ORDER — METOPROLOL SUCCINATE 25 MG/1
25 TABLET, EXTENDED RELEASE ORAL
Qty: 30 TABLET | Refills: 0 | Status: SHIPPED | OUTPATIENT
Start: 2023-03-28 | End: 2023-04-27

## 2023-03-28 RX ORDER — MIDAZOLAM HYDROCHLORIDE 1 MG/ML
INJECTION, SOLUTION INTRAMUSCULAR; INTRAVENOUS AS NEEDED
Status: DISCONTINUED | OUTPATIENT
Start: 2023-03-28 | End: 2023-03-28 | Stop reason: HOSPADM

## 2023-03-28 RX ADMIN — ASPIRIN 162 MG: 81 TABLET, COATED ORAL at 12:39

## 2023-03-28 RX ADMIN — ACETAMINOPHEN 650 MG: 325 TABLET ORAL at 10:54

## 2023-03-28 RX ADMIN — EZETIMIBE 10 MG: 10 TABLET ORAL at 12:39

## 2023-03-28 RX ADMIN — METOPROLOL SUCCINATE 25 MG: 25 TABLET, EXTENDED RELEASE ORAL at 12:39

## 2023-03-28 RX ADMIN — CLOPIDOGREL BISULFATE 75 MG: 75 TABLET ORAL at 12:39

## 2023-03-28 NOTE — PROGRESS NOTES
TRANSFER - OUT REPORT:    Verbal report given to Kelly RN(name) on Jourdan Torres  being transferred to (unit) for routine progression of care       Report consisted of patients Situation, Background, Assessment and   Recommendations(SBAR). Information from the following report(s) Procedure Summary, Intake/Output, MAR, Accordion, Recent Results, Med Rec Status, and Cardiac Rhythm SR  was reviewed with the receiving nurse. Lines:   Peripheral IV 03/26/23 Right Antecubital (Active)   Site Assessment Intact 03/27/23 2016   Phlebitis Assessment 0 03/27/23 2016   Infiltration Assessment 0 03/27/23 2016   Dressing Status Intact 03/27/23 2016   Dressing Type Transparent 03/27/23 2016   Hub Color/Line Status Pink 03/27/23 2016   Action Taken Open ports on tubing capped 03/27/23 2016   Alcohol Cap Used Yes 03/27/23 2016        Opportunity for questions and clarification was provided.     D/c instructions sheet for home an radial vascular access checklist  Patient transported with:   Monitor  Tech    1005 transferred via stretcher to room 355  1025 rec'd by Ori aSntos RN   2 ml of air removed from TR band no bleeding or hematoma noted  60ml of air left in TR band

## 2023-03-28 NOTE — PROGRESS NOTES
Cardiac Cath Lab Recovery Arrival Note:      Jose Bradford arrived to Cardiac Cath Lab, Recovery Area. Staff introduced to patient. Patient identifiers verified with NAME and DATE OF BIRTH. Procedure verified with patient. Consent forms reviewed and signed by patient or authorized representative and verified. Allergies verified. Patient and family oriented to department. Patient and family informed of procedure and plan of care. Questions answered with review. Patient prepped for procedure, per orders from physician, prior to arrival.    Patient on cardiac monitor, non-invasive blood pressure, SPO2 monitor. On room air. Patient is A&Ox 4. Patient reports no complaints. Patient in stretcher, in low position, with side rails up, call bell within reach, patient instructed to call if assistance as needed. Patient prep in: [address], [place]. Patient family has pager # [phone]  Family in: outside hospital   and Mrs Alverna Nageotte  [phone].    Prep by: Carley Pineda RN   Pre cath teaching completed  Dr Yony uMjica notified of pt's location

## 2023-03-28 NOTE — PROGRESS NOTES
TRANSFER - IN REPORT:    Verbal report received from [facility] on Romayne Abed  being received from procedure for routine progression of care. Report consisted of patients Situation, Background, Assessment and Recommendations(SBAR). Information from the following report(s) Procedure Summary, MAR, Accordion, Recent Results, and Med Rec Status was reviewed with the receiving clinician. Opportunity for questions and clarification was provided. Assessment completed upon patients arrival to [address] [place] [address] and care assumed. Cardiac Cath Lab Recovery Arrival Note:    Romayne Abed arrived to [facility] recovery area. Patient procedure= C. Patient on cardiac monitor, non-invasive blood pressure, SPO2 monitor. On  O2 @ 2 lpm via n/c. IV  of nacl on pump at 75 ml/hr. Patient status doing well without problems. Patient is A&Ox 4. Patient reports no complaint. PROCEDURE SITE CHECK:    Procedure site:without any bleeding and or hematoma, no pain/discomfort reported at procedure site. No change in patient status. Continue to monitor patient and status.   Pt tolerated drink

## 2023-03-28 NOTE — PROGRESS NOTES
CATH LAB to RECOVERY ROOM REPORT    Procedure: [facility]     MD:    The procedure was diagnostic only    Verbal Report given to Recovery Nurse on patient being transferred to Cardiac Cath Lab  for routine post-op. Patient stable upon transfer to . Vitals, mental status, MAR, procedural summary discussed with recovery RN.     Medication given during procedure:    Contrast:40mL                          Heparin:4000units     Versed:4 mg                                                               Fentanyl:50mcg                                                                     Sheaths:    Right radial sheath pulled at 0853 am, band at 12mL of air

## 2023-03-28 NOTE — PROGRESS NOTES
Problem: Discharge Planning  Goal: *Discharge to safe environment  Outcome: Progressing Towards Goal     Problem: Hypotension  Goal: *Blood pressure within specified parameters  Outcome: Progressing Towards Goal  Goal: *Fluid volume balance  Outcome: Progressing Towards Goal  Goal: *Labs within defined limits  Outcome: Progressing Towards Goal     Problem: Patient Education: Go to Patient Education Activity  Goal: Patient/Family Education  Outcome: Progressing Towards Goal

## 2023-03-28 NOTE — PROGRESS NOTES
2000 Received Pt from [facility] [address] Verbal report given to cath lab    0720 Cath lab picked up pt     0730 Bedside and Verbal Shift change report given to [facility] (oncoming nurse) by Odalis Reese RN. Report included the following information SBAR, Kardex, MAR, Recent Results, and Cardiac Rhythm NSR-Sinus Ammon Poor.

## 2023-03-28 NOTE — DISCHARGE SUMMARY
Discharge Summary       PATIENT ID: Sommer Carson  MRN: [id number]   YOB: 1965    DATE OF ADMISSION: 3/26/2023 10:23 PM    DATE OF DISCHARGE: 03/28/23    PRIMARY CARE PROVIDER: Chente Barker MD     ATTENDING PHYSICIAN: Ebony Bernal MD   DISCHARGING PROVIDER: Ebony Bernal MD    To contact this individual call [phone] and ask the  to page. If unavailable ask to be transferred the Adult Hospitalist Department. CONSULTATIONS: IP CONSULT TO CARDIOLOGY  IP CONSULT TO ELECTROPHYSIOLOGY    PROCEDURES/SURGERIES: Procedure(s):  LEFT HEART CATH / CORONARY ANGIOGRAPHY    ADMITTING [address] [place] [address] COURSE:   HPI: \"This is a 56-year-old woman with past medical history significant for coronary artery disease status post multiple stent placement, up to six stents; dyslipidemia; anxiety/depression; and hypertension, presented at the emergency room with chest pain. This has been going on for the past 3 days. Initially, the chest pain was intermittent but it has become constant in the last 24 hours. Pain is located at the center of the chest, constant burning pain, associated with shortness of breath but no radiation, no known aggravating or relieving factors, 9/10 in severity. The patient denies associated fever, rigors or chills. When the patient arrived at the emergency room, the first set of troponin was negative but because the patient has established coronary artery disease and she is status post multiple stent placement, she was referred to the hospitalist service for admission. She was last admitted to the hospital from 08/19/2021 to 08/20/2021. The patient at that time presented with chest pain, underwent stress test that was negative. \"        DISCHARGE DIAGNOSES / PLAN:      Chest pain, NSTEMI ruled out   Hx of CAD with 6 prior stents   History of PVCs/NSVT post PVC ablation 2017  Hypokalemia  Obesity  Anxiety  HTN  HLD  GERD  Patient has significant risk factors despite having negative cardiac enzymes high risk, cannot tolerate stress test  - Appreciate cardiology recs left heart cath completed 3/28 with no new blockages noted, patent stents   -can fu op for tte   - Continue aspirin Plavix statin Imdur  -Continue sotalol and Toprol, Currently in sinus rhythm  -Replete lytes and follow, magnesium within normal limit  -Counseled on diet weight loss  -Continue Abilify/Celexa/Xanax/Restoril     Follow-up outpatient with cardiology/EP        Code status: full   Prophylaxis: [address] discussed with:/Family, nurse  Anticipated Disposition: dc home today   Inpatient  Cardiac monitoring: Telemetry         FOLLOW UP APPOINTMENTS:    Follow-up Information       Follow up With Specialties Details Why Contact Info    Garcia Interiano MD Family Medicine Call in 1 day(s)  [address]  [address]      Julio Chandler MD Cardiovascular Disease Physician Call in 1 day(s)  [facility]  [address]      Radha Chicas MD Cardiovascular Disease Physician Call in 1 day(s)  [facility]  [address]               ADDITIONAL CARE RECOMMENDATIONS: Repeat CBC, BMP 3 to 5 days    DIET: Resume previous diet    ACTIVITY: Activity as tolerated      DISCHARGE MEDICATIONS:  Current Discharge Medication List        START taking these medications    Details   metoprolol succinate (TOPROL-XL) 25 mg XL tablet Take 1 Tablet by mouth daily (with lunch) for 30 days. Qty: 30 Tablet, Refills: 0  Start date: 3/28/2023, End date: 4/27/2023           CONTINUE these medications which have NOT CHANGED    Details   ARIPiprazole (ABILIFY) 2 mg tablet       lidocaine (LIDODERM) 5 % Apply 1 Patch to affected area daily. isosorbide mononitrate ER (IMDUR) 30 mg tablet Take 1 Tablet by mouth daily. Qty: 90 Tablet, Refills: 1      citalopram (CELEXA) 40 mg tablet Take 40 mg by mouth as needed.       nitroglycerin (NITROSTAT) 0.4 mg SL tablet 0.4 mg by SubLINGual route every five (5) minutes as needed. diclofenac (VOLTAREN) 1 % gel APPLY 1 GRAM TO AFFECTED AREA 3 TIMES A DAY AS NEEDED  Refills: 1      pantoprazole (PROTONIX) 40 mg tablet Take 40 mg by mouth daily. ALPRAZolam (XANAX) 1 mg tablet Take 1 mg by mouth three (3) times daily as needed for Anxiety.      ezetimibe (ZETIA) 10 mg tablet Take 10 mg by mouth daily. aspirin delayed-release 81 mg tablet TAKE 2 TABLETS BY MOUTH DAILY  Qty: 60 Tab, Refills: 5      SOTALOL HCL (SOTALOL PO) Take 60 mg by mouth two (2) times a day. temazepam (RESTORIL) 30 mg capsule Take 30 mg by mouth nightly as needed for Sleep. rosuvastatin (CRESTOR) 40 mg tablet Take 40 mg by mouth nightly. fenofibrate (LOFIBRA) 160 mg tablet Take 1 Tab by mouth daily. Qty: 90 Tab, Refills: 2    Associated Diagnoses: Pure hypercholesterolemia; Carotid artery disease without cerebral infarction ([facility] [address] Coronary artery disease involving native coronary artery of native heart without angina pectoris; Generalized anxiety disorder; History of tobacco abuse; Family history of coronary artery bypass graft; Dizzy; Mixed hyperlipidemia; Hyperlipidemia, unspecified hyperlipidemia type; Anxiety      lisinopril (PRINIVIL, ZESTRIL) 5 mg tablet Take 1 Tab by mouth daily. Qty: 90 Tab, Refills: 3    Associated Diagnoses: Coronary artery disease involving native coronary artery of native heart without angina pectoris; Hyperlipidemia, unspecified hyperlipidemia type; Carotid artery disease without cerebral infarction [facility]); Generalized anxiety disorder; History of tobacco abuse; Family history of coronary artery bypass graft; Dizzy; Mixed hyperlipidemia; Pure hypercholesterolemia; Anxiety      clopidogrel (PLAVIX) 75 mg tablet Take 1 Tab by mouth daily.   Qty: 90 Tab, Refills: 3    Associated Diagnoses: Coronary artery disease involving native coronary artery of native heart without angina pectoris; Hyperlipidemia, unspecified hyperlipidemia type; Carotid artery disease without cerebral infarction [facility]); Generalized anxiety disorder; History of tobacco abuse; Family history of coronary artery bypass graft; Dizzy; Mixed hyperlipidemia; Pure hypercholesterolemia; Anxiety      furosemide (LASIX) 20 mg tablet TAKE 1 TAB BY MOUTH EVERY DAY AS NEEDED FOR EDEMA (TAKE ADDITIONAL TAB OF POTASSIUM WHEN USING THIS)      Klor-Con M20 20 mEq tablet Take 20 mEq by mouth as needed. Taking 10 meq daily      meclizine (ANTIVERT) 25 mg tablet Take 25 mg by mouth three (3) times daily as needed for Dizziness. ondansetron (ZOFRAN ODT) 8 mg disintegrating tablet Take 1 Tab by mouth every eight (8) hours as needed for Nausea. Qty: 15 Tab, Refills: 0           STOP taking these medications       metoprolol tartrate (LOPRESSOR) 25 mg tablet Comments:   Reason for Stopping:                 NOTIFY YOUR PHYSICIAN FOR ANY OF THE FOLLOWING:   Fever over 101 degrees for 24 hours. Chest pain, shortness of breath, fever, chills, nausea, vomiting, diarrhea, change in mentation, falling, weakness, bleeding. Severe pain or pain not relieved by medications. Or, any other signs or symptoms that you may have questions about.     DISPOSITION:    Home With:   OT  PT  HH  RN       Long term SNF/Inpatient Rehab   x Independent/assisted living    Hospice    Other:       PATIENT CONDITION AT DISCHARGE:     Functional status    Poor     Deconditioned    x Independent      Cognition    x Lucid     Forgetful     Dementia      Catheters/lines (plus indication)    Rizo     PICC     PEG    x None      Code status    x Full code     DNR      PHYSICAL EXAMINATION AT DISCHARGE:    General : alert x 3, awake, no acute distress,   HEENT: PEERL, EOMI, moist mucus membrane  Neck: supple, no JVD, no meningeal signs  Chest: Clear to auscultation bilaterally   CVS: S1 S2 heard, Capillary refill less than 2 seconds  Abd: soft/ Non tender, non distended, BS physiological,   Ext: no clubbing, no cyanosis, no edema, brisk 2+ DP pulses  Neuro/Psych: pleasant mood and affect, CN 2-12 grossly intact, sensory grossly within normal limit, Strength 5/5 in all extremities  Skin: warm     CHRONIC MEDICAL DIAGNOSES:  Problem List as of 3/28/2023 Date Reviewed: 3/27/2023            Codes Class Noted - Resolved    * (Principal) Chest pain ICD-10-CM: R07.9  ICD-9-CM: 786.50  8/19/2021 - Present        Hypertension ICD-10-CM: I10  ICD-9-CM: 401.9  Unknown - Present        JAMEEL (acute kidney injury) ([facility] [address] ICD-10-CM: N17.9  ICD-9-CM: 171. 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) ICD-10-CM: I77.9  ICD-9-CM: 447.9  9/4/2015 - Present        CAD (coronary artery disease) ICD-10-CM: I25.10  ICD-9-CM: 414.00  9/4/2015 - Present    Overview Addendum 11/6/2015  2:52 PM by oDug Ramos MD     6/12 acute mi, stent to occluded lcx    10/13 unstable angina, stents to rca. Widely patent LCX stents, 30-40% prox LAD stenosis with widely paten mid vessel stent. RCA 50% ostial, 75%prox,           70% mid and 90% prox GURWINDER. 4 by 20mm ostial queta with overlapping 4 by 32mm queta in prox/mid vessel.  2.5 by 18mm queta to prox GURWINDER.    6/12 normal carotid dopplers  9/14 bilateral 10-49% carotid stenoses  2/13 normal stress cardiolyte lvef 65%  1/14 normal stress echo  10/14 normal stress echo               Familial hypercholesterolemia ICD-10-CM: E78.01  ICD-9-CM: 272.0  9/4/2015 - Present RESOLVED: History of tobacco abuse ICD-10-CM: Z87.891  ICD-9-CM: V15.82  10/12/2015 - 2/8/2016        RESOLVED: Tobacco abuse ICD-10-CM: Z72.0  ICD-9-CM: 305.1  9/4/2015 - 10/12/2015        RESOLVED: Family history of coronary artery bypass graft ICD-10-CM: Z82.49  ICD-9-CM: V17.49  9/4/2015 - 2/8/2016           Greater than 31 minutes were spent with the patient on counseling and coordination of care    Signed:   Isaac Dick MD  3/28/2023  11:58 AM

## 2023-03-28 NOTE — PROCEDURES
Findings:  1) normal LVEDP  2) nonobstructive coronary artery disease and major epicardial vessels.   Branch vessel disease involving 50% stenosis in right AV groove branch, chronic total occlusion of a small OM 2 filling by collaterals  3) patent stents in mid LAD, proximal RCA, mid circumflex      Contrast: 40 cc  Access: Right radial, mild spasm    Recommendations  1)GDMT for secondary prevention  2) treat as nonischemic chest pain

## 2023-03-28 NOTE — PROGRESS NOTES
TRANSFER - IN Cath Lab RR REPORT:    Verbal report received from Erich RN(name) on [facility]  being received from 3N(unit) for ordered procedure      Report consisted of patients Situation, Background, Assessment and   Recommendations(SBAR). Information from the following report(s) SBAR, Kardex, ED Summary, and MAR was reviewed with the receiving nurse. Opportunity for questions and clarification was provided. Assessment completed upon patients arrival to unit and care assumed.

## 2023-03-28 NOTE — PROGRESS NOTES
Hospital follow-up PCP transitional care appointment has been scheduled with Dr. Meliton Page for Monday April 3, 2023 at 3:20 p.m.? Pending patient discharge.   Karen Hendricks, Care Management Assistant

## 2023-03-29 NOTE — Clinical Note
TRANSFER - OUT REPORT:     Verbal report given to: (at bedside). Report consisted of patient's Situation, Background, Assessment and   Recommendations(SBAR). Opportunity for questions and clarification was provided. Patient transported with a Registered Nurse. Patient transported to: recovery. done

## 2023-03-30 ENCOUNTER — DOCUMENTATION ONLY (OUTPATIENT)
Dept: CARDIOLOGY CLINIC | Age: 58
End: 2023-03-30

## 2023-03-31 ENCOUNTER — TELEPHONE (OUTPATIENT)
Dept: SURGERY | Age: 58
End: 2023-03-31

## 2023-03-31 NOTE — PROGRESS NOTES
She called and worried about the result she read and said her chest pain is mild but different from before and she did not understand the report and wants to know why the small Om2 total occlusion is not opened up by Dr Nini Crum  I explained to her the reason and reassured her  The report said: nonobstructive coronary artery disease and major epicardial vessels.     Branch vessel disease involving 50% stenosis in right AV groove branch, chronic total occlusion of a small OM 2 filling by collaterals  patent stents in mid LAD, proximal RCA, mid circumflex    She said she is still choosing cardiologist to follow up  She had seen Dr Leandro Mccullough and Sean Avery but wants to think over and call back to office

## 2023-03-31 NOTE — TELEPHONE ENCOUNTER
----- Message from Carlos Heller MD sent at 3/24/2023 10:54 AM EDT -----  Regarding: Catalino quigley  Please arrange for her to see Latonya Gordon to discuss Saxenda/Wegovy.   Thanks

## 2023-04-02 NOTE — PROGRESS NOTES
Physician Progress Note      Mell Ordonez  CSN #:                  [id number]  :                       1965  ADMIT DATE:       3/26/2023 10:23 PM  Alise Donato DATE:        3/28/2023 4:11 PM  RESPONDING  PROVIDER #:        Kendrick Swanson MD          QUERY TEXT:    Pt noted to have chest pain. LHC was negative for any ischemia. If possible, please document in progress notes and discharge summary if you are evaluating and/or treating any of the following: The medical record reflects the following:  Risk Factors: CAD, anxiety, GERD, HLD    Clinical Indicators:    Trop 3-<3-4    LHC 3/28-  1) normal LVEDP  2) nonobstructive coronary artery disease and major epicardial vessels. Branch vessel disease involving 50% stenosis in right AV groove branch, chronic total occlusion of a small OM 2 filling by collaterals  3) patent stents in mid LAD, proximal RCA, mid circumflex    Treatment: Asa; Plavix; Imdur; Metoprolol; Sotalol    Thank you,  Mary Baptiste RN, BSN, CRCR, CCDS, SMART  Clinical Documentation Improvement  Sagar [email or web address]. [email or web address]  [phone] or via [employer]  Options provided:  -- Chest pain due to CAD  -- Chest pain due to GERD  -- Chest pain due to anxiety  -- Chest pain due to musculoskeletal  -- Other - I will add my own diagnosis  -- Disagree - Not applicable / Not valid  -- Disagree - Clinically unable to determine / Unknown  -- Refer to Clinical Documentation Reviewer    PROVIDER RESPONSE TEXT:    This patient has chest pain due to anxiety.     Query created by: Madelyn Hansen on 3/30/2023 12:29 PM      Electronically signed by:  Kendrick Swanson MD 2023 5:32 PM

## 2023-04-03 ENCOUNTER — OFFICE VISIT (OUTPATIENT)
Dept: FAMILY MEDICINE CLINIC | Age: 58
End: 2023-04-03
Payer: COMMERCIAL

## 2023-04-03 DIAGNOSIS — I10 PRIMARY HYPERTENSION: ICD-10-CM

## 2023-04-03 DIAGNOSIS — F41.9 ANXIETY AND DEPRESSION: ICD-10-CM

## 2023-04-03 DIAGNOSIS — Z09 HOSPITAL DISCHARGE FOLLOW-UP: ICD-10-CM

## 2023-04-03 DIAGNOSIS — E78.01 FAMILIAL HYPERCHOLESTEROLEMIA: ICD-10-CM

## 2023-04-03 DIAGNOSIS — Z00.00 ENCOUNTER FOR MEDICAL EXAMINATION TO ESTABLISH CARE: Primary | ICD-10-CM

## 2023-04-03 DIAGNOSIS — F32.A ANXIETY AND DEPRESSION: ICD-10-CM

## 2023-04-03 DIAGNOSIS — I77.9 CAROTID ARTERY DISEASE WITHOUT CEREBRAL INFARCTION (HCC): ICD-10-CM

## 2023-04-03 PROCEDURE — 99204 OFFICE O/P NEW MOD 45 MIN: CPT

## 2023-04-03 PROCEDURE — 3078F DIAST BP <80 MM HG: CPT

## 2023-04-03 PROCEDURE — 1111F DSCHRG MED/CURRENT MED MERGE: CPT

## 2023-04-03 PROCEDURE — 3074F SYST BP LT 130 MM HG: CPT

## 2023-04-03 RX ORDER — TOBRAMYCIN AND DEXAMETHASONE 3; 1 MG/ML; MG/ML
SUSPENSION/ DROPS OPHTHALMIC
COMMUNITY
Start: 2023-03-07 | End: 2023-03-14

## 2023-04-03 RX ORDER — ALPRAZOLAM 0.5 MG/1
0.5 TABLET ORAL
COMMUNITY
Start: 2023-03-22

## 2023-04-03 RX ORDER — SUVOREXANT 10 MG/1
10 TABLET, FILM COATED ORAL
COMMUNITY
Start: 2023-03-03 | End: 2023-04-02

## 2023-04-03 RX ORDER — ESZOPICLONE 2 MG/1
TABLET, FILM COATED ORAL
COMMUNITY
Start: 2023-02-17 | End: 2023-03-17

## 2023-04-03 RX ORDER — SOTALOL HYDROCHLORIDE 120 MG/1
60 TABLET ORAL 2 TIMES DAILY
COMMUNITY
Start: 2023-02-04

## 2023-04-03 RX ORDER — TRIAMCINOLONE ACETONIDE 1 MG/G
CREAM TOPICAL
COMMUNITY

## 2023-04-03 NOTE — PROGRESS NOTES
Health Maintenance Due   Topic Date Due    Cervical cancer screen  Never done    Colorectal Cancer Screening Combo  Never done    Shingles Vaccine (1 of 2) Never done    Low dose CT lung screening  Never done    COVID-19 Vaccine (4 - Booster for Pfizer series) 12/07/2021    DTaP/Tdap/Td series (2 - Tdap) 09/01/2022     1. \"Have you been to the ER, urgent care clinic since your last visit? Hospitalized since your last visit? \"  Yes    2. \"Have you seen or consulted any other health care providers outside of the [address] [place] [address] since your last visit? \"  New Patient

## 2023-04-03 NOTE — PROGRESS NOTES
Transitional Care Management Progress Note    Patient: Ren Rodriguez  : 1965  PCP: Kenisha Davenport MD    Date of office visit: 4/3/2023   Date of admission: 3/26/23  Date of discharge: 3/28/23  Hospital: [facility]    Call initiated w/i 2 business dates of discharge: *No response documented in the last 14 days   Date of the most recent call to the patient: *No documented post hospital discharge outreach found in the last 14 days      Assessment/Plan:   Diagnoses and all orders for this visit:    1. Encounter for medical examination to establish care    2. Hospital discharge follow-up  -     CT [address] [employer] MEDS RECONCILED W/CURRENT MED LIST  All medication is correct except question on Imdur. Patient will follow-up with Dr. Jackie Meyer regarding medication frequency. Back to baseline. Symptoms improved. No red flag symptoms at this time but she is aware red flag symptoms that warrant ED visit. 3. Carotid artery disease without cerebral infarction [facility])  Continue with follow-up with Dr. Jackie Meyer, Leigh Kamara and Delfin. Continue with all current medication. She is going to follow-up with Dr. Jackie Meyer who prescribed the Imdur to find out if she should be taking this medication daily or just as needed. Requested she let our office know if she is taking this daily or as needed. Discussed red flag symptoms that warrant ED visit. 4. Primary hypertension  Vitals are stable. Continue with all cardiac medications. Discussed hypertensive symptoms, follow-up if they develop. Continue follow-up with all cardiologist     5. Familial hypercholesterolemia  Continue with statin medication. Cholesterol was stable on 2022. Consider fasting lab work at preventative exam unless she has this done with cardiologist.   Continue follow-up with all cardiologist     6. Anxiety and depression  Continue management by Dr. Adi Del Valle. Provided patient to [facility] for counseling services.      The complexity of medical decision making for this patient's transitional care is moderate   Follow-up and Dispositions    Return in about 3 months (around 7/3/2023), or if symptoms worsen or fail to improve, for Preventative exam. . Has an appointment with Dr. Felix Messer in June to go over preventative care. Subjective: Carito Tejada is a 66 y.o. female presenting today for follow-up after hospital discharge. This encounter and supporting documentation was reviewed if available. Medication reconciliation was performed today. The main problem requiring admission was chest pain . Complications during admission: none      Interval history/Current status:     PMH significant for CAD s/p multliple stents (last stent 2013,  last cath 2017 Dr. Gareth Otto, nonobstructive CAD 30% ostial RCA nonobstructive), pvcs/NSVT s/p ablation pvcs 2017, familial HLD, HTN, GERD    PROCEDURES/SURGERIES: Procedure(s):  LEFT HEART CATH / [place]:   HPI: \"This is a 61-year-old woman with past medical history significant for coronary artery disease status post multiple stent placement, up to six stents; dyslipidemia; anxiety/depression; and hypertension, presented at the emergency room with chest pain. This has been going on for the past 3 days. Initially, the chest pain was intermittent but it has become constant in the last 24 hours. Pain is located at the center of the chest, constant burning pain, associated with shortness of breath but no radiation, no known aggravating or relieving factors, 9/10 in severity. The patient denies associated fever, rigors or chills. When the patient arrived at the emergency room, the first set of troponin was negative but because the patient has established coronary artery disease and she is status post multiple stent placement, she was referred to the hospitalist service for admission. She was last admitted to the hospital from 08/19/2021 to 08/20/2021.   The patient at that time presented with chest pain, underwent stress test that was negative. \"           DISCHARGE DIAGNOSES / PLAN:       Chest pain, NSTEMI ruled out   Hx of CAD with 6 prior stents   History of PVCs/NSVT post PVC ablation 2017  Hypokalemia  Obesity  Anxiety  HTN  HLD  GERD  Patient has significant risk factors despite having negative cardiac enzymes high risk, cannot tolerate stress test  - Appreciate cardiology recs left heart cath completed 3/28 with no new blockages noted, patent stents   -can fu op for tte   - Continue aspirin Plavix statin Imdur  -Continue sotalol and Toprol, Currently in sinus rhythm  -Replete lytes and follow, magnesium within normal limit  -Counseled on diet weight loss  -Continue Abilify/Celexa/Xanax/Restoril     Follow-up outpatient with cardiology/EP     Cardiac Cath Findings:  1) normal LVEDP  2) nonobstructive coronary artery disease and major epicardial vessels. Branch vessel disease involving 50% stenosis in right AV groove branch, chronic total occlusion of a small OM 2 filling by collaterals  3) patent stents in mid LAD, proximal RCA, mid circumflex    Serial troponins x 3 which were normal (3, <3, 4). Pro . EKG with no ischemic findings    Admitting symptoms have: improved    Today, she present to establish care and hospital discharge follow-up. She is feeling better since discharge from the ER. Per patient report she is back to baseline. She notes what triggered her to go to the ER was because she was having chest pain with exertion. Since ER she is no longer having chest pain with exertion but she is also not overly exerting herself. She will take 3 steps and then stop but with the three steps no chest pain or SOB. No orthopnea, PND, claudication or lower extremity swelling. She is currently followed by Dr. Feliciano Schwartz (EP at [facility]), Dr. Mina Peacock at [facility] and [facility]). She is trying to transition everything to Dr. Feliciano Schwartz.  She also plans to make an appointment with Dr. Bere Motta for hospital discharge follow-up    Medication reconciled today and she notes she is taking imdur as needed and not daily. Hospital notes that she should take this daily. Dr. Bradly Marin recent note has her taking this as needed but Dr. Robyn Flores has her taking this daily. Patient was under the impression she was to take this medication as needed and not daily. She notes she has always taken this medication as needed. Has been taking all other cardiac medication as prescribed. Of note,   Cardiac MRI (02/04/2022): LVEF 63%, minimal decreased thickening seen along basilar inferolat wall. Subendocardial based enhancement in inferolat wall from base to mid segment of LV (7% LV myocardium). Native myocardial native T1 values elevated (1500 ms) in this region. Native myocardial T2 values in this region normal, suggest against an acute process. Ischemic pattern of enhancement in a coronary distribution is consistent with myocardial infarct. Back at baseline. She also has a history of anxiety/depression:   She reports the anxiety and depression is all due to her extensive cardiac problems. She currently sees Dr. Josef Saxena once a month  Does not have a counselor at this time. Well controlled on current medications. Denies SI/HI. \"I want to live. \"     Medications marked \"taking\" at this time:  Prior to Admission medications    Medication Sig Start Date End Date Taking? Authorizing Provider   sotaloL (BETAPACE) 120 mg tablet Take 60 mg by mouth two (2) times a day. 2/4/23  Yes Provider, Historical   ALPRAZolam (XANAX) 0.5 mg tablet Take 0.5 mg by mouth daily as needed. 3/22/23  Yes Provider, Historical   triamcinolone acetonide (KENALOG) 0.1 % topical cream Apply  to affected area two (2) times daily as needed for Skin Irritation. use thin layer   Yes Provider, Historical   metoprolol succinate (TOPROL-XL) 25 mg XL tablet Take 1 Tablet by mouth daily (with lunch) for 30 days. 3/28/23 4/27/23 Yes Jennifer Black MD   ARIPiprazole (ABILIFY) 2 mg tablet  1/23/23  Yes Provider, Historical   lidocaine (LIDODERM) 5 % Apply 1 Patch to affected area daily. 2/1/23 2/1/24 Yes Provider, Historical   isosorbide mononitrate ER (IMDUR) 30 mg tablet Take 1 Tablet by mouth daily. Patient taking differently: Take 30 mg by mouth as needed. 2/27/23  Yes Jace Noriega MD   citalopram (CELEXA) 40 mg tablet Take 40 mg by mouth as needed. 9/13/21  Yes Provider, Historical   furosemide (LASIX) 20 mg tablet TAKE 1 TAB BY MOUTH EVERY DAY AS NEEDED FOR EDEMA (TAKE ADDITIONAL TAB OF POTASSIUM WHEN USING THIS) 9/13/21  Yes Provider, Historical   Klor-Con M20 20 mEq tablet Take 20 mEq by mouth as needed. Taking 10 meq daily 9/30/21  Yes Provider, Historical   nitroglycerin (NITROSTAT) 0.4 mg SL tablet 0.4 mg by SubLINGual route every five (5) minutes as needed. Yes Provider, Historical   diclofenac (VOLTAREN) 1 % gel APPLY 1 GRAM TO AFFECTED AREA 3 TIMES A DAY AS NEEDED 10/14/19  Yes Provider, Historical   pantoprazole (PROTONIX) 40 mg tablet Take 40 mg by mouth daily. Yes Other, MD Julieta   ALPRAZolam (XANAX) 1 mg tablet Take 1 mg by mouth three (3) times daily as needed for Anxiety. Yes Provider, Historical   ezetimibe (ZETIA) 10 mg tablet Take 10 mg by mouth daily. Yes Provider, Historical   aspirin delayed-release 81 mg tablet TAKE 2 TABLETS BY MOUTH DAILY 4/23/17  Yes Steven Gleason MD   SOTALOL HCL (SOTALOL PO) Take 120 mg by mouth two (2) times a day. Yes Provider, Historical   temazepam (RESTORIL) 30 mg capsule Take 30 mg by mouth nightly as needed for Sleep. Yes Provider, Historical   rosuvastatin (CRESTOR) 40 mg tablet Take 40 mg by mouth nightly. Yes Provider, Historical   fenofibrate (LOFIBRA) 160 mg tablet Take 1 Tab by mouth daily. 10/5/16  Yes Kleber Varela MD   lisinopril (PRINIVIL, ZESTRIL) 5 mg tablet Take 1 Tab by mouth daily.  3/1/16  Yes Kleber Varela MD clopidogrel (PLAVIX) 75 mg tablet Take 1 Tab by mouth daily. 3/1/16  Yes Valerie Galvez MD   Belsomra 10 mg tablet Take 10 mg by mouth nightly. 3/3/23 4/2/23  Provider, Historical   meclizine (ANTIVERT) 25 mg tablet Take 25 mg by mouth three (3) times daily as needed for Dizziness. Patient not taking: No sig reported  4/3/23  Provider, Historical   ondansetron (ZOFRAN ODT) 8 mg disintegrating tablet Take 1 Tab by mouth every eight (8) hours as needed for Nausea. Patient not taking: Reported on 4/3/2023 3/23/19   Adri Amezquita MD        Review of Systems   Constitutional:  Negative for chills, diaphoresis, fever, malaise/fatigue and weight loss. HENT: Negative. Eyes: Negative. Respiratory:  Negative for cough and shortness of breath. Cardiovascular:  Negative for palpitations, orthopnea, claudication, leg swelling and PND. Gastrointestinal:  Negative for abdominal pain, blood in stool, constipation, diarrhea, heartburn, melena, nausea and vomiting. Genitourinary:  Negative for dysuria, flank pain, frequency, hematuria and urgency. Musculoskeletal: Negative. Skin: Negative. Neurological:  Negative for dizziness, tingling, weakness and headaches. Endo/Heme/Allergies: Negative. Psychiatric/Behavioral: Negative.           Objective:   Visit Vitals  /67 (BP 1 Location: Right upper arm, BP Patient Position: Sitting, BP Cuff Size: Large adult)   Pulse (!) 58   Temp 97.5 °F (36.4 °C) (Temporal)   Resp 18   Ht 5' 3\" (1.6 m)   Wt 180 lb 3.2 oz (81.7 kg)   LMP  (LMP Unknown)   SpO2 99%   BMI 31.92 kg/m²     Gen: alert, oriented, no acute distress  Ears: external auditory canals clear, TMs without erythema or effusion  Eyes: pupils equal round reactive to light, sclera clear, conjunctiva clear  Oral: moist mucus membranes, no oral lesions, no pharyngeal inflammation or exudate  Neck: thyroid symmetric and not enlarged, no carotid bruits, no jugular vein distention  Resp: no increase work of breathing, lungs clear to ausculation bilaterally, no wheezing, rales or rhonchi  CV: S1, S2 normal. No murmurs, rubs, or gallops. Abd: soft, not tender, not distended. No hepatosplenomegaly. Normal bowel sounds. No hernias. Neuro: cranial nerves intact, normal strength and movement in all extremities, reflexes and sensation intact and symmetric. Skin: no lesion or rash  Extremities: no cyanosis or edema       We discussed the expected course, resolution and complications of the diagnosis(es) in detail. Medication risks, benefits, costs, interactions, and alternatives were discussed as indicated. I advised her to contact the office if her condition worsens, changes or fails to improve as anticipated. She expressed understanding with the diagnosis(es) and plan.      Oli Mckeon NP

## 2023-04-03 NOTE — PATIENT INSTRUCTIONS
[facility]    Physical Address [address]     Phone: : [phone] or [phone]  Fax: : [phone]

## 2023-04-13 ENCOUNTER — OFFICE VISIT (OUTPATIENT)
Dept: SURGERY | Age: 58
End: 2023-04-13
Payer: COMMERCIAL

## 2023-04-13 VITALS
TEMPERATURE: 98.5 F | DIASTOLIC BLOOD PRESSURE: 59 MMHG | HEIGHT: 63 IN | WEIGHT: 182 LBS | BODY MASS INDEX: 32.25 KG/M2 | HEART RATE: 63 BPM | RESPIRATION RATE: 16 BRPM | OXYGEN SATURATION: 95 % | SYSTOLIC BLOOD PRESSURE: 105 MMHG

## 2023-04-13 DIAGNOSIS — E66.9 OBESITY (BMI 30-39.9): Primary | ICD-10-CM

## 2023-04-13 PROCEDURE — 3078F DIAST BP <80 MM HG: CPT | Performed by: NURSE PRACTITIONER

## 2023-04-13 PROCEDURE — 99213 OFFICE O/P EST LOW 20 MIN: CPT | Performed by: NURSE PRACTITIONER

## 2023-04-13 PROCEDURE — 3074F SYST BP LT 130 MM HG: CPT | Performed by: NURSE PRACTITIONER

## 2023-04-18 NOTE — PROGRESS NOTES
Chief Complaint   Patient presents with    Obesity     Discuss medications      Jourdan Torres was referred to me by Dr. Carol Borges to discuss options for obesity medication management. She was working with [facility] weight mgmt center, but wanted a more direct route to medication management. She has significant CAD, recent cath and is under cardiology care. She has HTN, hyperlipidemia, and central obesity. Has intermittent chest pain. Says she \"eats well\" and does not struggle with appetite \"I eat because I have to\"   Walks every day   Frustrated by her weight and \"just can't lose like I used to\"   Sleep is terrible   Denies binging or purging behavior   No ETOH     She is here with her daughter because Patricia Newman needs help with her weight too\"     Visit Vitals  BP (!) 105/59 (BP 1 Location: Left upper arm, BP Patient Position: Sitting, BP Cuff Size: Large adult)   Pulse 63   Temp 98.5 °F (36.9 °C) (Oral)   Resp 16   Ht 5' 3\" (1.6 m)   Wt 182 lb (82.6 kg)   LMP  (LMP Unknown)   SpO2 95%   BMI 32.24 kg/m²     A + O x 3  Chest  CTA  COR  RRR  ABD Soft, central obesity, umbilical hernia /ND, +BS. EXT No edema; ambulating independently      ICD-10-CM ICD-9-CM    1. Obesity (BMI 30-39. 9)  E66.9 278.00         66year old female with central obesity   Co-morbidities: CAD, HTN, Hyperlipidemia, poor sleep quality  Not a candidate for stimulants due to HTN and CAD   Do not recommend Xenical due to diarrhea and risk for nutritional deficiencies/electrolyte imbalance r/t med induced diarrhea   Contrave not indicated as she does not exhibit binge eating behavior   May be a candidate for GLP1-RA; however, should discuss with her PCP and cardiologist. Explained the role of GLP1-RA meds and long term duration of therapy, weight loss expectations, and side effects   She is not keen on doing an injection   I am not comfortable prescribing at this time for her due to recent cardiac events and need for long term follow up   Encouraged her to follow up with Stas Astudillo MD for consideration   Provided her and her daughter information   Ankush Wells verbalized understanding and questions were answered to the best of my knowledge and ability. Obesity medications educational materials were provided.

## 2023-04-24 ENCOUNTER — TRANSCRIBE ORDERS (OUTPATIENT)
Facility: HOSPITAL | Age: 58
End: 2023-04-24

## 2023-04-24 DIAGNOSIS — R22.1 NECK MASS: Primary | ICD-10-CM

## 2023-05-04 ENCOUNTER — HOSPITAL ENCOUNTER (OUTPATIENT)
Dept: CT IMAGING | Age: 58
Discharge: HOME | End: 2023-05-04
Attending: OTOLARYNGOLOGY
Payer: MEDICAID

## 2023-05-04 DIAGNOSIS — R22.1 NECK MASS: ICD-10-CM

## 2023-05-04 PROCEDURE — 74011000636 HC RX REV CODE- 636: Performed by: OTOLARYNGOLOGY

## 2023-05-04 PROCEDURE — 70491 CT SOFT TISSUE NECK W/DYE: CPT

## 2023-05-04 RX ADMIN — IOPAMIDOL 100 ML: 755 INJECTION, SOLUTION INTRAVENOUS at 11:16

## 2023-06-30 DIAGNOSIS — R73.02 IGT (IMPAIRED GLUCOSE TOLERANCE): ICD-10-CM

## 2023-06-30 DIAGNOSIS — I77.9 CAROTID ARTERY DISEASE WITHOUT CEREBRAL INFARCTION (HCC): ICD-10-CM

## 2023-07-01 LAB
ALBUMIN SERPL-MCNC: 4 G/DL (ref 3.5–5)
ALBUMIN/GLOB SERPL: 1.4 (ref 1.1–2.2)
ALP SERPL-CCNC: 61 U/L (ref 45–117)
ALT SERPL-CCNC: 32 U/L (ref 12–78)
ANION GAP SERPL CALC-SCNC: 5 MMOL/L (ref 5–15)
AST SERPL-CCNC: 24 U/L (ref 15–37)
BILIRUB SERPL-MCNC: 0.4 MG/DL (ref 0.2–1)
BUN SERPL-MCNC: 16 MG/DL (ref 6–20)
BUN/CREAT SERPL: 21 (ref 12–20)
CALCIUM SERPL-MCNC: 9.9 MG/DL (ref 8.5–10.1)
CHLORIDE SERPL-SCNC: 111 MMOL/L (ref 97–108)
CO2 SERPL-SCNC: 26 MMOL/L (ref 21–32)
CREAT SERPL-MCNC: 0.78 MG/DL (ref 0.55–1.02)
EST. AVERAGE GLUCOSE BLD GHB EST-MCNC: 97 MG/DL
GLOBULIN SER CALC-MCNC: 2.8 G/DL (ref 2–4)
GLUCOSE SERPL-MCNC: 80 MG/DL (ref 65–100)
HBA1C MFR BLD: 5 % (ref 4–5.6)
POTASSIUM SERPL-SCNC: 4.4 MMOL/L (ref 3.5–5.1)
PROT SERPL-MCNC: 6.8 G/DL (ref 6.4–8.2)
SODIUM SERPL-SCNC: 142 MMOL/L (ref 136–145)

## 2023-07-06 ENCOUNTER — TELEPHONE (OUTPATIENT)
Age: 58
End: 2023-07-06

## 2023-07-06 NOTE — TELEPHONE ENCOUNTER
Pt is calling in ref to kidney functions wanting to know what you thought about them. She would like to here back from you because they were flagged.  Wanting to know if more testing needed to be done

## 2023-07-07 NOTE — TELEPHONE ENCOUNTER
Kidneys look fine. Did she see something that concerned her? The only kidney related lab that flagged high was BUN/creatinine ratio.   When it is high this could indicate she was a little dehydrated when these labs were drawn

## 2024-01-30 RX ORDER — ISOSORBIDE MONONITRATE 30 MG/1
30 TABLET, EXTENDED RELEASE ORAL DAILY
Qty: 90 TABLET | Refills: 1 | OUTPATIENT
Start: 2024-01-30

## 2024-03-25 NOTE — ADVANCED PRACTICE NURSE
[facility] [address] [place] [address] Drive, [address] [facility] [address]   [place], [address]   [phone]  Juanito Hankins  9/16/2020  3:51 PM      09/15/20   NUCLEAR CARDIAC STRESS TEST 09/16/2020, 09/16/2020 9/16/2020    Narrative · Baseline ECG: Normal EKG. · FINDINGS: The rest and stress perfusion images demonstrate no   significant perfusion defect or evidence of myocardial reversibility. The   gated images demonstrate normal global and regional wall motion and   thickening. Left ventricular ejection fraction is 75 %. Impression: Normal   gated rest/stress myocardial perfusion imaging study. No evidence of   myocardial ischemia or infarction. Left ventricular ejection fraction is   75 %. Signed by: Glenda Hancock MD; Jose Ugalde MD     No evidence for myocardial ischemia or infarction. CP considered non cardiac. Needs no further cardiac testing  At this time.     Diann Mckee PA-C hair removal not indicated

## 2024-05-20 RX ORDER — ISOSORBIDE MONONITRATE 30 MG/1
30 TABLET, EXTENDED RELEASE ORAL DAILY
Qty: 90 TABLET | Refills: 1 | OUTPATIENT
Start: 2024-05-20

## 2024-06-11 RX ORDER — ISOSORBIDE MONONITRATE 30 MG/1
30 TABLET, EXTENDED RELEASE ORAL DAILY
Qty: 90 TABLET | Refills: 1 | OUTPATIENT
Start: 2024-06-11

## 2024-07-15 RX ORDER — ISOSORBIDE MONONITRATE 30 MG/1
30 TABLET, EXTENDED RELEASE ORAL DAILY
Qty: 90 TABLET | Refills: 1 | OUTPATIENT
Start: 2024-07-15

## 2024-09-24 ENCOUNTER — TRANSCRIBE ORDERS (OUTPATIENT)
Facility: HOSPITAL | Age: 59
End: 2024-09-24

## 2024-09-24 DIAGNOSIS — Z12.31 SCREENING MAMMOGRAM FOR HIGH-RISK PATIENT: Primary | ICD-10-CM

## 2024-10-08 ENCOUNTER — HOSPITAL ENCOUNTER (OUTPATIENT)
Age: 59
Discharge: HOME | End: 2024-10-11
Payer: COMMERCIAL

## 2024-10-08 VITALS — WEIGHT: 150 LBS | BODY MASS INDEX: 26.58 KG/M2 | HEIGHT: 63 IN

## 2024-10-08 DIAGNOSIS — Z12.31 ENCOUNTER FOR SCREENING MAMMOGRAM FOR HIGH-RISK PATIENT: ICD-10-CM

## 2024-10-08 PROCEDURE — 77063 BREAST TOMOSYNTHESIS BI: CPT

## (undated) DEVICE — ANGIOGRAPHY KIT

## (undated) DEVICE — WASTE KIT - ST MARY: Brand: MEDLINE INDUSTRIES, INC.

## (undated) DEVICE — CATH 5F 100CM JL35 -- DXTERITY

## (undated) DEVICE — KIT MFLD ISOLATN NACL CNTRST PRT TBNG SPIK W/ PRSS TRNSDUC

## (undated) DEVICE — CATH 5F 100CM JR40 -- DXTERITY

## (undated) DEVICE — TR BAND RADIAL ARTERY COMPRESSION DEVICE: Brand: TR BAND

## (undated) DEVICE — GLIDESHEATH SLENDER STAINLESS STEEL KIT: Brand: GLIDESHEATH SLENDER

## (undated) DEVICE — KIT HND CTRL 3 W STPCOCK ROT END 54IN PREM HI PRSS TBNG AT

## (undated) DEVICE — KIT MED IMAG CNTRST AGNT W/ IOPAMIDOL REUSE

## (undated) DEVICE — SPECIAL PROCEDURE DRAPE 32" X 34": Brand: SPECIAL PROCEDURE DRAPE

## (undated) DEVICE — PACK PROCEDURE SURG HRT CATH